# Patient Record
Sex: MALE | Race: WHITE | NOT HISPANIC OR LATINO | Employment: OTHER | URBAN - METROPOLITAN AREA
[De-identification: names, ages, dates, MRNs, and addresses within clinical notes are randomized per-mention and may not be internally consistent; named-entity substitution may affect disease eponyms.]

---

## 2017-01-07 ENCOUNTER — HOSPITAL ENCOUNTER (EMERGENCY)
Facility: CLINIC | Age: 32
Discharge: HOME | End: 2017-01-07
Attending: EMERGENCY MEDICINE | Admitting: EMERGENCY MEDICINE
Payer: COMMERCIAL

## 2017-01-07 VITALS
RESPIRATION RATE: 20 BRPM | HEIGHT: 72 IN | DIASTOLIC BLOOD PRESSURE: 90 MMHG | BODY MASS INDEX: 20.99 KG/M2 | TEMPERATURE: 98.6 F | OXYGEN SATURATION: 100 % | WEIGHT: 155 LBS | SYSTOLIC BLOOD PRESSURE: 123 MMHG

## 2017-01-07 DIAGNOSIS — F41.9 ANXIETY: ICD-10-CM

## 2017-01-07 PROCEDURE — 99285 EMERGENCY DEPT VISIT HI MDM: CPT | Mod: 25

## 2017-01-07 PROCEDURE — 25000132 ZZH RX MED GY IP 250 OP 250 PS 637: Performed by: EMERGENCY MEDICINE

## 2017-01-07 PROCEDURE — 90791 PSYCH DIAGNOSTIC EVALUATION: CPT

## 2017-01-07 PROCEDURE — 99285 EMERGENCY DEPT VISIT HI MDM: CPT

## 2017-01-07 RX ORDER — LORAZEPAM 1 MG/1
1 TABLET ORAL ONCE
Status: COMPLETED | OUTPATIENT
Start: 2017-01-07 | End: 2017-01-07

## 2017-01-07 RX ORDER — LORAZEPAM 1 MG/1
1 TABLET ORAL EVERY 8 HOURS PRN
Qty: 20 TABLET | Refills: 0 | Status: SHIPPED | OUTPATIENT
Start: 2017-01-07 | End: 2021-12-14

## 2017-01-07 RX ADMIN — LORAZEPAM 1 MG: 1 TABLET ORAL at 11:16

## 2017-01-07 ASSESSMENT — ENCOUNTER SYMPTOMS
NERVOUS/ANXIOUS: 1
DIAPHORESIS: 1
NAUSEA: 0
VOMITING: 0
DYSURIA: 0
SHORTNESS OF BREATH: 1
ROS GI COMMENTS: NO BOWEL CHANGES
APPETITE CHANGE: 1

## 2017-01-07 NOTE — ED PROVIDER NOTES
"  History     Chief Complaint:  Anxiety    HPI   Fuentes Zamora is a 31 year old male with a history of depression, generalized anxiety disorder, social phobia and panic disorder who presents to the ED for evaluation of Anxiety. The patient states his anxiety has not been this bad since middle school and he cannot relax, sit still or sleep. He mentions he has been off of his Anxiety medications, including no Seroquel for two weeks and no Abilify for about two months. He was seeing a therapist at, but has not gone in awhile. He presents today because the anxiety is \"unbearable\". He also complains of headache, a weird \"cold sweat\", back pain \"from being tense\", chest pain, shortness of breath and decreased appetite which are typical for his anxiety. He denies nausea, vomiting, bowel changes, urinary changes. He has no suicidal or homicidal thoughts. He denies drug use and states he only occasionally drinks alcohol.    Allergies:  No known drug allergies    Medications:    Ibuprofen  Tylenol    Past Medical History:    Depression  Generalized anxiety disorder  Social phobia  Panic disorder  Acne    Past Surgical History:    Tooth extraction    Family History:    Breast cancer- mother  Depression- mother, sister  Anxiety- mother, sister  Substance abuse- father, sister  Bipolar- sister    Social History:  The patient drove himself to the ED from [place].  Smoking Status: Current ever day smoker 0.50 packs/day  Alcohol Use: No  Marital Status:  Single      Review of Systems   Constitutional: Positive for diaphoresis and appetite change.   Respiratory: Positive for shortness of breath.    Cardiovascular: Positive for chest pain.   Gastrointestinal: Negative for nausea and vomiting.        No bowel changes   Genitourinary: Negative for dysuria.   Psychiatric/Behavioral: Negative for suicidal ideas. The patient is nervous/anxious.    All other systems reviewed and are negative.    Physical Exam   First Vitals:  BP: " 109/88mmHg  Temp: 98.6F (37C) Oral  HR: 80  Resp: 20  SpO2: 100%    Physical Exam  Constitutional: The patient is oriented to person, place, and time. Alert and cooperative.  HENT:   Right Ear: External ear normal.   Left Ear: External ear normal.   Nose: Nose normal.    Eyes: Conjunctivae, EOM and lids are normal.   Neck: Trachea normal. Normal range of motion. Neck supple.   Cardiovascular: Normal rate, regular rhythm, normal heart sounds, and intact distal pulses.    Pulmonary/Chest: Effort normal and breath sounds equal bilaterally. No crackles or wheezing.   Abdominal: Soft. No tenderness. No rebound and no guarding.   Musculoskeletal: Normal range of motion.  No extremity tenderness or edema.  Neurological: Alert and Oriented. No facial asymmetry. Strength 5/5 in the upper and lower extremities bilaterally.  Skin: Skin is dry. No rash noted.    Psych: Anxious appearing.     Emergency Department Course   ECG done at 1111. ECG read at 1118. Indication: Chest pain and SOB  Rate 72 bpm. AR interval 138. QRS duration 88. QT/QTc 358/392. P-R-T axes 32 92 68.  Normal sinus rhythm. Rightward axis. Borderline ECG.     Interventions:  1104 Ativan 1mg Tablet PO    Emergency Department Course:  Nursing notes and vitals reviewed.  I performed an exam of the patient as documented above.     1144 I spoke to DEC about the patient.    1231 I spoke to DEC about the patient.    Findings and plan explained to the patient. Patient discharged home with instructions regarding supportive care, medications, and reasons to return. The importance of close follow-up was reviewed.    Impression & Plan    Medical Decision Making:  Fuentes Zamora is a 31 year old male with a history of anxiety, depression, panic disorder and social phobia who presents to the emergency department for evaluation of anxiety. Upon presentation in the ED, the patient is non toxic appearing and vitals are within normal limits and stable. He does appear somewhat  anxious. He is alert, oriented and his neurologic exam is non focal. Cardiopulmonary exam is unremarkable. Abdomen is non tender throughout. The rest of his exam is as mentioned above. He was given a dose of Ativan. EKG was obtained and demonstrates normal sinus rhythm. There are no concerning acute ischemic changes. DEC was consulted and they did evaluate the patient. They were able to get the patient in to see a Psychiatry NP this Tuesday  for medication management and further evaluation. The patient denies suicidal or homicidal thoughts, therefore he does not meet criteria for inpatient management or a hold at this time. In addition, the patient does note that he feels safe being discharged at this time. Overall, given the patient's history and presentation, I do feel his symptoms are consistent with anxiety. EKG was obtained and demonstrates normal sinus rhythm. There are no concerning acute ischemic changes. Therefore, I have very low suspicion for any acute cardiac etiology of his symptoms and do not feel lab evaluation is indicated at this time. He is PERC negative, therefore PE would be unlikely and further evaluation for this is not indicated at this time. His lungs are clear to ascultation bilaterally on exam, therefore I do not feel that chest Xray is indicated at this time. The patient does note significant improvement in his symptoms after the above mentioned interventions. Overall, given that the patient is well appearing here with improved symptoms, I do feel that he can be safely discharged to home.  I did discuss with the patient that I recommend close follow up with his primary care physician and with the Psychiatry NP at his Tuesday appointment. The patient notes understanding and agreement. Return instructions were given. He was stable/improved at the time of discharge.     Diagnosis:  Anxiety    Disposition:  The patient was discharged home.    1/7/2017   [facility] EMERGENCY  DEPARTMENT    I, Cassy Azul, am serving as a scribe at 1046 on January 7, 2017  to document services personally performed by Dr. Leung, based on my observations and the provider's statements to me.    Fe Leung MD  01/07/17 2825

## 2017-01-07 NOTE — DISCHARGE INSTRUCTIONS
PLease follow up closely with your regular physician and the psychiatry NP at your scheduled appointment this Tuesday. Please return to the ED if your symptoms worsen or if you develop new or concerning symptoms.         Anxiety Reaction  Anxiety is the feeling we all get when we think something bad might happen. It is a normal response to stress and usually causes only a mild reaction. When anxiety becomes more severe, it can interfere with daily life. In some cases, you may not even be aware of what it is you re anxious about. There may also be a genetic link or it may be a learned behavior in the home.  Both psychological and physical triggers cause stress reaction. It's often a response to fear or emotional stress, real or imagined. This stress may come from home, family, work, or social relationships.  During an anxiety reaction, you may feel:    Helpless    Nervous    Depressed    Irritable  Your body may show signs of anxiety in many ways. You may experience:    Dry mouth    Shakiness    Dizziness    Weakness    Trouble breathing    Breathing fast (hyperventilating)    Chest pressure    Sweating    Headache    Nausea    Diarrhea    Tiredness    Inability to sleep    Sexual problems  Home care    Try to locate the sources of stress in your life. They may not be obvious. These may include:    Daily hassles of life (traffic jams, missed appointments, car troubles, etc.)    Major life changes, both good (new baby, job promotion) and bad (loss of job, loss of loved one)    Overload: feeling that you have too many responsibilities and can't take care of all of them at once    Feeling helpless, feeling that your problems are beyond what you re able to solve    Notice how your body reacts to stress. Learn to listen to your body signals. This will help you take action before the stress becomes severe.    When you can, do something about the source of your stress. (Avoid hassles, limit the amount of change that happens  in your life at one time and take a break when you feel overloaded).    Unfortunately, many stressful situations can't be avoided. It is necessary to learn how to better manage stress. There are many proven methods that will reduce your anxiety. These include simple things like exercise, good nutrition and adequate rest. Also, there are certain techniques that are helpful:    Relaxation    Breathing exercises    Visualization    Biofeedback    Meditation  For more information about this, consult your doctor or go to a local bookstore and review the many books and tapes available on this subject.  Follow-up care  If you feel that your anxiety is not responding to self-help measures, contact your doctor or make an appointment with a counselor. You may need short-term psychological counseling and temporary medicine to help you manage stress.  Call 911  Call your healthcare provider right away if any of these occur:    Trouble breathing    Confusion    Drowsiness or trouble wakening    Fainting or loss of consciousness    Rapid heart rate    Seizure    New chest pain that becomes more severe, lasts longer, or spreads into your shoulder, arm, neck, jaw, or back  When to seek medical advice  Call your healthcare provider right away if any of these occur:    Your symptoms get worse    Severe headache not relieved by rest and mild pain reliever    0159-0488 [employer]. [address] [place] [address], [place] [address]. All rights reserved. This information is not intended as a substitute for professional medical care. Always follow your healthcare professional's instructions.

## 2017-01-07 NOTE — ED NOTES
Pt has hx of anxiety and ran out of medications.  His anxiety got worse over past 3 days and he is unable to sleep.

## 2017-01-07 NOTE — ED AVS SNAPSHOT
[facility] Emergency Department    [address]    [facility] [address]    Phone:  [phone]    Fax:  [phone]                                       Fuentes Zamora   MRN: [id number]    Department:  [facility] Emergency Department   Date of Visit:  1/7/2017           After Visit Summary Signature Page     I have received my discharge instructions, and my questions have been answered. I have discussed any challenges I see with this plan with the nurse or doctor.    ..........................................................................................................................................  Patient/Patient Representative Signature      ..........................................................................................................................................  Patient Representative Print Name and Relationship to Patient    ..................................................               ................................................  Date                                            Time    ..........................................................................................................................................  Reviewed by Signature/Title    ...................................................              ..............................................  Date                                                            Time

## 2017-01-07 NOTE — ED AVS SNAPSHOT
[facility] Emergency Department    [address] [place] [address]    Phone:  [phone]    Fax:  [phone]                                       Fuentes Zamora   MRN: [id number]    Department:  [facility] Emergency Department   Date of Visit:  1/7/2017           Patient Information     Date Of Birth          1985        Your diagnoses for this visit were:     Anxiety        You were seen by Fe Leung MD.      Follow-up Information     Follow up with Clinic, [facility] In 3 days.    Contact information:    [phone]          Discharge Instructions       PLease follow up closely with your regular physician and the psychiatry NP at your scheduled appointment this Tuesday. Please return to the ED if your symptoms worsen or if you develop new or concerning symptoms.         Anxiety Reaction  Anxiety is the feeling we all get when we think something bad might happen. It is a normal response to stress and usually causes only a mild reaction. When anxiety becomes more severe, it can interfere with daily life. In some cases, you may not even be aware of what it is you re anxious about. There may also be a genetic link or it may be a learned behavior in the home.  Both psychological and physical triggers cause stress reaction. It's often a response to fear or emotional stress, real or imagined. This stress may come from home, family, work, or social relationships.  During an anxiety reaction, you may feel:    Helpless    Nervous    Depressed    Irritable  Your body may show signs of anxiety in many ways. You may experience:    Dry mouth    Shakiness    Dizziness    Weakness    Trouble breathing    Breathing fast (hyperventilating)    Chest pressure    Sweating    Headache    Nausea    Diarrhea    Tiredness    Inability to sleep    Sexual problems  Home care    Try to locate the sources of stress in your life. They may not be obvious. These may  include:    Daily hassles of life (traffic jams, missed appointments, car troubles, etc.)    Major life changes, both good (new baby, job promotion) and bad (loss of job, loss of loved one)    Overload: feeling that you have too many responsibilities and can't take care of all of them at once    Feeling helpless, feeling that your problems are beyond what you re able to solve    Notice how your body reacts to stress. Learn to listen to your body signals. This will help you take action before the stress becomes severe.    When you can, do something about the source of your stress. (Avoid hassles, limit the amount of change that happens in your life at one time and take a break when you feel overloaded).    Unfortunately, many stressful situations can't be avoided. It is necessary to learn how to better manage stress. There are many proven methods that will reduce your anxiety. These include simple things like exercise, good nutrition and adequate rest. Also, there are certain techniques that are helpful:    Relaxation    Breathing exercises    Visualization    Biofeedback    Meditation  For more information about this, consult your doctor or go to a local bookstore and review the many books and tapes available on this subject.  Follow-up care  If you feel that your anxiety is not responding to self-help measures, contact your doctor or make an appointment with a counselor. You may need short-term psychological counseling and temporary medicine to help you manage stress.  Call 911  Call your healthcare provider right away if any of these occur:    Trouble breathing    Confusion    Drowsiness or trouble wakening    Fainting or loss of consciousness    Rapid heart rate    Seizure    New chest pain that becomes more severe, lasts longer, or spreads into your shoulder, arm, neck, jaw, or back  When to seek medical advice  Call your healthcare provider right away if any of these occur:    Your symptoms get worse    Severe  headache not relieved by rest and mild pain reliever    5512-7307 [employer]. [address] [place] [address], [place] [address]. All rights reserved. This information is not intended as a substitute for professional medical care. Always follow your healthcare professional's instructions.          24 Hour Appointment Hotline       To make an appointment at any [facility], call [phone] ([phone]). If you don't have a family doctor or clinic, we will help you find one. [facility] clinics are conveniently located to serve the needs of you and your family.             Review of your medicines      START taking        Dose / Directions Last dose taken    LORazepam 1 MG tablet   Commonly known as:  ATIVAN   Dose:  1 mg   Quantity:  20 tablet        Take 1 tablet (1 mg) by mouth every 8 hours as needed for anxiety   Refills:  0          Our records show that you are taking the medicines listed below. If these are incorrect, please call your family doctor or clinic.        Dose / Directions Last dose taken    ABILIFY PO   Dose:  10 mg        Take 10 mg by mouth daily   Refills:  0        ibuprofen 200 MG tablet   Commonly known as:  ADVIL/MOTRIN   Dose:  600 mg   Quantity:  30 tablet        Take 3 tablets (600 mg) by mouth every 8 hours as needed for mild pain   Refills:  0        QUEtiapine 25 MG tablet   Commonly known as:  SEROquel   Dose:  25-50 mg   Quantity:  90 tablet        Take 1-2 tablets (25-50 mg) by mouth every 4 hours as needed (Severe anxiety)   Refills:  0        TYLENOL PO        Refills:  0                Prescriptions were sent or printed at these locations (1 Prescription)                   Other Prescriptions                Printed at Department/Unit printer (1 of 1)         LORazepam (ATIVAN) 1 MG tablet                Procedures and tests performed during your visit     EKG 12 lead      Orders Needing Specimen Collection     None      Pending Results     Date and Time Order Name  Status Description    1/7/2017 1104 EKG 12 lead Preliminary             Pending Culture Results     No orders found from 1/6/2017 to 1/8/2017.             Test Results from your hospital stay            Clinical Quality Measure: Blood Pressure Screening     Your blood pressure was checked while you were in the emergency department today. The last reading we obtained was  BP: 123/90 mmHg . Please read the guidelines below about what these numbers mean and what you should do about them.  If your systolic blood pressure (the top number) is less than 120 and your diastolic blood pressure (the bottom number) is less than 80, then your blood pressure is normal. There is nothing more that you need to do about it.  If your systolic blood pressure (the top number) is 120-139 or your diastolic blood pressure (the bottom number) is 80-89, your blood pressure may be higher than it should be. You should have your blood pressure rechecked within a year by a primary care provider.  If your systolic blood pressure (the top number) is 140 or greater or your diastolic blood pressure (the bottom number) is 90 or greater, you may have high blood pressure. High blood pressure is treatable, but if left untreated over time it can put you at risk for heart attack, stroke, or kidney failure. You should have your blood pressure rechecked by a primary care provider within the next 4 weeks.  If your provider in the emergency department today gave you specific instructions to follow-up with your doctor or provider even sooner than that, you should follow that instruction and not wait for up to 4 weeks for your follow-up visit.        Thank you for choosing [facility]       Thank you for choosing [facility] for your care. Our goal is always to provide you with excellent care. Hearing back from our patients is one way we can continue to improve our services. Please take a few minutes to complete the written survey that you may receive in the mail  "after you visit with us. Thank you!        [facility] Information     [employer] lets you send messages to your doctor, view your test results, renew your prescriptions, schedule appointments and more. To sign up, go to [email or web address] . Click on \"Log in\" on the left side of the screen, which will take you to the Welcome page. Then click on \"Sign up Now\" on the right side of the page.     You will be asked to enter the access code listed below, as well as some personal information. Please follow the directions to create your username and password.     Your access code is: [email or web address]  Expires: 2017  1:25 PM     Your access code will  in 90 days. If you need help or a new code, please call your [facility] clinic or [phone].        Care EveryWhere ID     This is your Care EveryWhere ID. This could be used by other organizations to access your [facility] medical records  [id number]        After Visit Summary       This is your record. Keep this with you and show to your community pharmacist(s) and doctor(s) at your next visit.                  "

## 2017-01-09 LAB — INTERPRETATION ECG - MUSE: NORMAL

## 2017-01-15 ENCOUNTER — HOSPITAL ENCOUNTER (EMERGENCY)
Facility: CLINIC | Age: 32
Discharge: HOME | End: 2017-01-15
Attending: EMERGENCY MEDICINE | Admitting: EMERGENCY MEDICINE
Payer: COMMERCIAL

## 2017-01-15 VITALS
TEMPERATURE: 97.8 F | DIASTOLIC BLOOD PRESSURE: 73 MMHG | OXYGEN SATURATION: 98 % | HEART RATE: 102 BPM | RESPIRATION RATE: 16 BRPM | SYSTOLIC BLOOD PRESSURE: 136 MMHG

## 2017-01-15 DIAGNOSIS — F32.A DEPRESSION: ICD-10-CM

## 2017-01-15 DIAGNOSIS — F41.0 PANIC DISORDER WITHOUT AGORAPHOBIA: ICD-10-CM

## 2017-01-15 LAB — ALCOHOL BREATH TEST: 0.28 (ref 0–0.01)

## 2017-01-15 PROCEDURE — 82075 ASSAY OF BREATH ETHANOL: CPT | Performed by: EMERGENCY MEDICINE

## 2017-01-15 PROCEDURE — 25000132 ZZH RX MED GY IP 250 OP 250 PS 637: Performed by: EMERGENCY MEDICINE

## 2017-01-15 PROCEDURE — 99284 EMERGENCY DEPT VISIT MOD MDM: CPT | Mod: Z6 | Performed by: EMERGENCY MEDICINE

## 2017-01-15 PROCEDURE — 99285 EMERGENCY DEPT VISIT HI MDM: CPT | Performed by: EMERGENCY MEDICINE

## 2017-01-15 RX ORDER — QUETIAPINE FUMARATE 50 MG/1
50 TABLET, FILM COATED ORAL 3 TIMES DAILY
Qty: 30 TABLET | Refills: 0 | Status: SHIPPED | OUTPATIENT
Start: 2017-01-15 | End: 2021-12-14

## 2017-01-15 RX ORDER — ARIPIPRAZOLE 10 MG/1
10 TABLET ORAL DAILY
Qty: 30 TABLET | Refills: 0 | Status: SHIPPED | OUTPATIENT
Start: 2017-01-15 | End: 2021-12-14

## 2017-01-15 RX ORDER — DIAZEPAM 10 MG
10 TABLET ORAL ONCE
Status: COMPLETED | OUTPATIENT
Start: 2017-01-15 | End: 2017-01-15

## 2017-01-15 RX ADMIN — DIAZEPAM 10 MG: 10 TABLET ORAL at 02:33

## 2017-01-15 NOTE — ED NOTES
Bed: HW02  Expected date: 1/15/17  Expected time: 12:13 AM  Means of arrival: Ambulance  Comments:  Fatou  34 yo M  Psych eval

## 2017-01-15 NOTE — ED AVS SNAPSHOT
[facility], Emergency Department    [address] [place] [place] [address]    Phone:  [phone]    Fax:  [phone]                                       Fuentes Zamora   MRN: [id number]    Department:  [facility], Emergency Department   Date of Visit:  1/15/2017           After Visit Summary Signature Page     I have received my discharge instructions, and my questions have been answered. I have discussed any challenges I see with this plan with the nurse or doctor.    ..........................................................................................................................................  Patient/Patient Representative Signature      ..........................................................................................................................................  Patient Representative Print Name and Relationship to Patient    ..................................................               ................................................  Date                                            Time    ..........................................................................................................................................  Reviewed by Signature/Title    ...................................................              ..............................................  Date                                                            Time

## 2017-01-15 NOTE — ED AVS SNAPSHOT
[facility], Emergency Department    [address]    [place] [address]    Phone:  [phone]    Fax:  [phone]                                       Fuentes Zamora   MRN: [id number]    Department:  [facility], Emergency Department   Date of Visit:  1/15/2017           Patient Information     Date Of Birth          1985        Your diagnoses for this visit were:     Depression     Panic disorder without agoraphobia        You were seen by Elton Wu MD.        Discharge Instructions         Depression  Depression is one of the most common mental health problems today. It is not just a state of unhappiness or sadness. It is a true disease. The cause seems to be related to a decrease in chemicals that transmit signals in the brain. Having a family history of depression, alcoholism, or suicide increases the risk. Chronic illness, chronic pain, migraine headaches and high emotional stress also increase the risk.  Depression is something we tend to recognize in others, but may have a hard time seeing in ourselves. It can show in many physical and emotional ways:    Loss of appetite    Over-eating    Not being able to sleep    Sleeping too much    Tiredness not related to physical exertion    Restlessness or irritability    Slowness of movement or speech    Feeling depressed or withdrawn    Loss of interest in things you once enjoyed    Trouble concentrating, poor memory, trouble making decisions    Thoughts of harming or killing oneself, or thoughts that life is not worth living    Low self-esteem  The treatment for depression may include both medicine and psychotherapy. Antidepressants can reduce suffering and can improve the ability to function during the depressed period. Therapy can offer emotional support and help you understand emotional factors that may be causing the depression.  Home care    On-going care and support helps people manage this disease.  Find a healthcare  provider and therapist who meet your needs. Seek help when you feel like you may be getting ill.    Be kind to yourself. Make it a point to do things that you enjoy (gardening, walking in nature, going to a movie, etc.). Reward yourself for small successes.    Take care of your physical body. Eat a balanced diet (low in saturated fat and high in fruits and vegetables). Exercise at least 3 times a week for 30 minutes. Even mild-moderate exercise (like brisk walking) can make you feel better.    Avoid alcohol, which can make depression worse.    Take medicine as prescribed.    Tell each of your healthcare providers about all of the prescription drugs, over-the-counter medicines, vitamins, and supplements you take. Certain supplements interact with medicines and can result in dangerous side effects. Ask your pharmacist when you have questions about drug interactions.    Talk with your family and trusted friends about your feelings and thoughts. Ask them to help you recognize behavior changes early so you can get help and, if needed, medicine can be adjusted.  Follow-up care  Follow up with your healthcare provider, or as advised.  Call 911  Call 911 if you:    Have suicidal thoughts, a suicide plan, and the means to carry out the plan    Have trouble breathing    Are very confused    Feel very drowsy or have trouble awakening    Faint or lose consciousness    Have new chest pain that becomes more severe, lasts longer, or spreads into your shoulder, arm, neck, jaw or back  When to seek medical advice  Call your healthcare provider right away if any of these occur:    Feeling extreme depression, fear, anxiety, or anger toward yourself or others    Feeling out of control    Feeling that you may try to harm yourself or another    Hearing voices that others do not hear    Seeing things that others do not see    Can t sleep or eat for 3 days in a row    Friends or family express concern over your behavior and ask you to seek  help    4983-9770 [employer]. [address] [place] [address], [place] [address]. All rights reserved. This information is not intended as a substitute for professional medical care. Always follow your healthcare professional's instructions.          24 Hour Appointment Hotline       To make an appointment at any [facility], call [phone] ([phone]). If you don't have a family doctor or clinic, we will help you find one. [facility] are conveniently located to serve the needs of you and your family.             Review of your medicines      CONTINUE these medicines which may have CHANGED, or have new prescriptions. If we are uncertain of the size of tablets/capsules you have at home, strength may be listed as something that might have changed.        Dose / Directions Last dose taken    * ABILIFY PO   Dose:  10 mg   What changed:  Another medication with the same name was added. Make sure you understand how and when to take each.        Take 10 mg by mouth daily   Refills:  0        * ARIPiprazole 10 MG tablet   Commonly known as:  ABILIFY   Dose:  10 mg   What changed:  You were already taking a medication with the same name, and this prescription was added. Make sure you understand how and when to take each.   Quantity:  30 tablet        Take 1 tablet (10 mg) by mouth daily   Refills:  0        * QUEtiapine 25 MG tablet   Commonly known as:  SEROquel   Dose:  25-50 mg   What changed:    - how much to take  - when to take this   Quantity:  90 tablet        Take 1-2 tablets (25-50 mg) by mouth every 4 hours as needed (Severe anxiety)   Refills:  0        * QUEtiapine 50 MG tablet   Commonly known as:  SEROQUEL   Dose:  50 mg   What changed:  You were already taking a medication with the same name, and this prescription was added. Make sure you understand how and when to take each.   Quantity:  30 tablet        Take 1 tablet (50 mg) by mouth 3 times daily   Refills:  0        * Notice:   "This list has 4 medication(s) that are the same as other medications prescribed for you. Read the directions carefully, and ask your doctor or other care provider to review them with you.      Our records show that you are taking the medicines listed below. If these are incorrect, please call your family doctor or clinic.        Dose / Directions Last dose taken    ibuprofen 200 MG tablet   Commonly known as:  ADVIL/MOTRIN   Dose:  600 mg   Quantity:  30 tablet        Take 3 tablets (600 mg) by mouth every 8 hours as needed for mild pain   Refills:  0        LORazepam 1 MG tablet   Commonly known as:  ATIVAN   Dose:  1 mg   Quantity:  20 tablet        Take 1 tablet (1 mg) by mouth every 8 hours as needed for anxiety   Refills:  0        TYLENOL PO        Refills:  0                Prescriptions were sent or printed at these locations (2 Prescriptions)                   Other Prescriptions                Printed at Department/Unit printer (2 of 2)         QUEtiapine (SEROQUEL) 50 MG tablet               ARIPiprazole (ABILIFY) 10 MG tablet                Procedures and tests performed during your visit     Alcohol breath test POCT      Orders Needing Specimen Collection     None      Pending Results     No orders found from 1/14/2017 to 1/16/2017.            Pending Culture Results     No orders found from 1/14/2017 to 1/16/2017.            Thank you for choosing [facility]       Thank you for choosing [facility] for your care. Our goal is always to provide you with excellent care. Hearing back from our patients is one way we can continue to improve our services. Please take a few minutes to complete the written survey that you may receive in the mail after you visit with us. Thank you!        [employer] Information     [employer] lets you send messages to your doctor, view your test results, renew your prescriptions, schedule appointments and more. To sign up, go to [email or web address] . Click on \"Log in\" on the left side " "of the screen, which will take you to the Welcome page. Then click on \"Sign up Now\" on the right side of the page.     You will be asked to enter the access code listed below, as well as some personal information. Please follow the directions to create your username and password.     Your access code is: [email or web address]  Expires: 2017  1:25 PM     Your access code will  in 90 days. If you need help or a new code, please call your [facility] clinic or [phone].        Care EveryWhere ID     This is your Care EveryWhere ID. This could be used by other organizations to access your [facility] medical records  [id number]        After Visit Summary       This is your record. Keep this with you and show to your community pharmacist(s) and doctor(s) at your next visit.                  "

## 2017-01-15 NOTE — DISCHARGE INSTRUCTIONS

## 2017-01-15 NOTE — ED NOTES
Pt reports drinking 1 L whiskey per day since 1/1/17 to help manage his anxiety. He ran out of prescribed abilify about 2 months ago and was unable to make it to his appointments because he was living in [place] and doesn't drive. He obtained a prescription for Ativan on 1/7/17 and took all 20 pills within 3 days. Pt denies drinking those 3 days. He is seeking evaluation for his depression/anxiety as well as detox from alcohol. He reports suicidal ideation with no plan. Past attempt at hanging self in closet with a belt. He is able to contract for safety while in ED.

## 2017-01-22 NOTE — ED PROVIDER NOTES
History     Chief Complaint   Patient presents with     Anxiety     has been using alcohol to manage anxiety for about a week, since he ran out of his Rx lorazepam     Alcohol Problem     wants detox     HPI  Fuentes Zamora is a 32 year old male who presents by EMS requesting detox from alcohol.  He states he has been having increasing anxiety recently, since he ran out of his ativan.  He has been drinking whisky to cope with his anxiety, about 1 L per day.  He has been drinking for the last 2 months.  He states that he had some suicidal thoughts earlier but has no thoughts now.  He has a prior attempt at hanging him self.  He denies any other drug use.  He denies any history of seizure.  No vomiting or abdominal pain.        Past Medical History   Diagnosis Date     Other acne      Depressive disorder      anxiety     Anxiety        PAST SURGICAL HISTORY:   Past Surgical History   Procedure Laterality Date     Hc tooth extraction w/forcep       Under general anesthesia       FAMILY HISTORY:   Family History   Problem Relation Age of Onset     CANCER Mother      Breast cancer age 52     Depression Mother      Anxiety Disorder Mother      Substance Abuse Father      Substance Abuse Sister      Bipolar Disorder Sister      Anxiety Disorder Sister      Depression Sister        SOCIAL HISTORY:   Social History   Substance Use Topics     Smoking status: Current Every Day Smoker -- 0.50 packs/day     Types: Cigarettes     Smokeless tobacco: Not on file     Alcohol Use: Yes      Comment: 1 L whiskey/day since new years       Discharge Medication List as of 1/15/2017  6:21 AM      START taking these medications    Details   !! QUEtiapine (SEROQUEL) 50 MG tablet Take 1 tablet (50 mg) by mouth 3 times daily, Disp-30 tablet, R-0, Local Print      !! ARIPiprazole (ABILIFY) 10 MG tablet Take 1 tablet (10 mg) by mouth daily, Disp-30 tablet, R-0, Local Print       !! - Potential duplicate medications found. Please discuss with  provider.      CONTINUE these medications which have NOT CHANGED    Details   LORazepam (ATIVAN) 1 MG tablet Take 1 tablet (1 mg) by mouth every 8 hours as needed for anxiety, Disp-20 tablet, R-0, Local Print      !! QUEtiapine (SEROQUEL) 25 MG tablet Take 1-2 tablets (25-50 mg) by mouth every 4 hours as needed (Severe anxiety), Disp-90 tablet, R-0, Local Print      ibuprofen (ADVIL,MOTRIN) 200 MG tablet Take 3 tablets (600 mg) by mouth every 8 hours as needed for mild pain, Disp-30 tablet, R-0, Local Print      !! ARIPiprazole (ABILIFY PO) Take 10 mg by mouth daily , Historical      Acetaminophen (TYLENOL PO) Historical       !! - Potential duplicate medications found. Please discuss with provider.             Allergies   Allergen Reactions     No Known Allergies        I have reviewed the Medications, Allergies, Past Medical and Surgical History, and Social History in the Epic system.    Review of Systems   10 pt ROS completed and negative except as noted above in HPI      Physical Exam   BP: 113/84 mmHg  Pulse: 102  Heart Rate: 82  Temp: 97.6  F (36.4  C)  Resp: 18  SpO2: 100 %  Physical Exam   Constitutional: He is oriented to person, place, and time. No distress.   HENT:   Head: Normocephalic and atraumatic.   Mouth/Throat: Oropharynx is clear and moist. No oropharyngeal exudate.   Eyes: Conjunctivae are normal. Pupils are equal, round, and reactive to light.   Neck: Normal range of motion. Neck supple.   Cardiovascular: Normal rate and intact distal pulses.    Pulmonary/Chest: Effort normal. No respiratory distress. He has no wheezes. He has no rales.   Abdominal: Soft. There is no tenderness. There is no rebound and no guarding.   Musculoskeletal: Normal range of motion. He exhibits no edema or tenderness.   Neurological: He is alert and oriented to person, place, and time. He exhibits normal muscle tone.   Skin: Skin is warm and dry. No rash noted. He is not diaphoretic.   Psychiatric: He has a normal mood  and affect. His behavior is normal. Thought content normal.   Nursing note and vitals reviewed.      ED Course     [unfilled]  Procedures             Critical Care time:  none               Labs Ordered and Resulted from Time of ED Arrival Up to the Time of Departure from the ED   ALCOHOL BREATH TEST POCT - Abnormal; Notable for the following:     Alcohol Breath Test 0.275 (*)     All other components within normal limits       Assessments & Plan (with Medical Decision Making)   1.  Alcohol dependence    31 yo M with alcohol intoxication requesting detox.  ABT was 0.275.   He was monitored until sober and was given Valium 10mg PO for mild symptoms of withdrawal.  There were no detox beds available.  He was discharged with contact information for local detox units.      I have reviewed the nursing notes.    I have reviewed the findings, diagnosis, plan and need for follow up with the patient.    Discharge Medication List as of 1/15/2017  6:21 AM      START taking these medications    Details   !! QUEtiapine (SEROQUEL) 50 MG tablet Take 1 tablet (50 mg) by mouth 3 times daily, Disp-30 tablet, R-0, Local Print      !! ARIPiprazole (ABILIFY) 10 MG tablet Take 1 tablet (10 mg) by mouth daily, Disp-30 tablet, R-0, Local Print       !! - Potential duplicate medications found. Please discuss with provider.          Final diagnoses:   Panic disorder without agoraphobia       1/15/2017   [facility], [place], EMERGENCY DEPARTMENT      Elton Wu MD  01/22/17 2047

## 2020-11-05 ENCOUNTER — MEDICAL CORRESPONDENCE (OUTPATIENT)
Dept: HEALTH INFORMATION MANAGEMENT | Facility: CLINIC | Age: 35
End: 2020-11-05
Payer: COMMERCIAL

## 2021-12-14 ENCOUNTER — VIRTUAL VISIT (OUTPATIENT)
Dept: FAMILY MEDICINE | Facility: CLINIC | Age: 36
End: 2021-12-14
Payer: COMMERCIAL

## 2021-12-14 DIAGNOSIS — F32.1 MAJOR DEPRESSIVE DISORDER, SINGLE EPISODE, MODERATE (H): ICD-10-CM

## 2021-12-14 DIAGNOSIS — F90.0 ATTENTION DEFICIT HYPERACTIVITY DISORDER, PREDOMINANTLY INATTENTIVE TYPE: Primary | ICD-10-CM

## 2021-12-14 DIAGNOSIS — F41.1 GENERALIZED ANXIETY DISORDER: ICD-10-CM

## 2021-12-14 PROCEDURE — 99203 OFFICE O/P NEW LOW 30 MIN: CPT | Mod: 95 | Performed by: FAMILY MEDICINE

## 2021-12-14 RX ORDER — ARIPIPRAZOLE 10 MG/1
10 TABLET ORAL DAILY
Qty: 90 TABLET | Refills: 0 | Status: SHIPPED | OUTPATIENT
Start: 2021-12-14 | End: 2022-02-22

## 2021-12-14 RX ORDER — QUETIAPINE FUMARATE 50 MG/1
50 TABLET, FILM COATED ORAL AT BEDTIME
Qty: 90 TABLET | Refills: 0 | Status: SHIPPED | OUTPATIENT
Start: 2021-12-14 | End: 2022-02-22

## 2021-12-14 RX ORDER — ARIPIPRAZOLE 10 MG/1
10 TABLET ORAL DAILY
COMMUNITY
End: 2021-12-14

## 2021-12-14 ASSESSMENT — PATIENT HEALTH QUESTIONNAIRE - PHQ9
SUM OF ALL RESPONSES TO PHQ QUESTIONS 1-9: 18
5. POOR APPETITE OR OVEREATING: NEARLY EVERY DAY

## 2021-12-14 ASSESSMENT — ANXIETY QUESTIONNAIRES
7. FEELING AFRAID AS IF SOMETHING AWFUL MIGHT HAPPEN: NOT AT ALL
5. BEING SO RESTLESS THAT IT IS HARD TO SIT STILL: NEARLY EVERY DAY
IF YOU CHECKED OFF ANY PROBLEMS ON THIS QUESTIONNAIRE, HOW DIFFICULT HAVE THESE PROBLEMS MADE IT FOR YOU TO DO YOUR WORK, TAKE CARE OF THINGS AT HOME, OR GET ALONG WITH OTHER PEOPLE: VERY DIFFICULT
1. FEELING NERVOUS, ANXIOUS, OR ON EDGE: NEARLY EVERY DAY
GAD7 TOTAL SCORE: 16
2. NOT BEING ABLE TO STOP OR CONTROL WORRYING: NEARLY EVERY DAY
6. BECOMING EASILY ANNOYED OR IRRITABLE: SEVERAL DAYS
3. WORRYING TOO MUCH ABOUT DIFFERENT THINGS: NEARLY EVERY DAY

## 2021-12-14 NOTE — PROGRESS NOTES
Fuentes is a 36 year old who is being evaluated via a billable video visit.      How would you like to obtain your AVS? Mail a copy  If the video visit is dropped, the invitation should be resent by: Text to cell phone: [phone]  Will anyone else be joining your video visit? No    Video Start Time: 1407    Assessment & Plan     Attention deficit hyperactivity disorder, predominantly inattentive type  I did put in an referral to psychiatry for medication opinion and stabilization.  I have asked him to get a copy of his records from [place] regarding the ADHD evaluation as completed that type of information would make it much more comfortable for me to consider prescribing Adderall in the circumstances.  He was given information to contact our mental health referral team for consideration of consultation as well.  - Adult Mental Health Referral; Future    Major depressive disorder, single episode, moderate (H)  Patient reports that he uses Seroquel 50 mg at bedtime and Abilify 10 mg during the day.  I refilled these medications for him as he has been without them for about a month or month and a half.  I have asked for short-term recheck in about 4 weeks to see how he is doing.  - QUEtiapine (SEROQUEL) 50 MG tablet; Take 1 tablet (50 mg) by mouth At Bedtime  - ARIPiprazole (ABILIFY) 10 MG tablet; Take 1 tablet (10 mg) by mouth daily  - Adult Mental Health Referral; Future    Generalized anxiety disorder  As above.  - QUEtiapine (SEROQUEL) 50 MG tablet; Take 1 tablet (50 mg) by mouth At Bedtime  - ARIPiprazole (ABILIFY) 10 MG tablet; Take 1 tablet (10 mg) by mouth daily  - Adult Mental Health Referral; Future    Review of prior external note(s) from - CareEverywhere information from [facility]  reviewed         Tobacco Cessation:   reports that he has been smoking cigarettes. He has been smoking about 0.50 packs per day. He has never used smokeless tobacco.          Return in about 4 weeks (around  1/11/2022) for Mood Recheck, in person, video visit.    Washington Wynn MD  [facility]    Tello Dill is a 36 year old who presents for the following health issues     HPI     Depression and Anxiety Follow-Up    How are you doing with your depression since your last visit? Worsened since being off medication.    How are you doing with your anxiety since your last visit?  Worsened since being off medication.    Are you having other symptoms that might be associated with depression or anxiety? No    Have you had a significant life event? Job Concerns, Financial Concerns and Housing Concerns     Do you have any concerns with your use of alcohol or other drugs? No    Social History     Tobacco Use     Smoking status: Current Every Day Smoker     Packs/day: 0.50     Types: Cigarettes     Smokeless tobacco: Never Used   Vaping Use     Vaping Use: Every day     Substances: Nicotine   Substance Use Topics     Alcohol use: Yes     Comment: Variable     Drug use: No     PHQ 12/14/2021   PHQ-9 Total Score 18   Q9: Thoughts of better off dead/self-harm past 2 weeks Not at all     YANA-7 SCORE 5/8/2012 5/15/2012 12/14/2021   Total Score 8 8 -   Total Score - - 16     Last PHQ-9 12/14/2021   1.  Little interest or pleasure in doing things 3   2.  Feeling down, depressed, or hopeless 3   3.  Trouble falling or staying asleep, or sleeping too much 3   4.  Feeling tired or having little energy 3   5.  Poor appetite or overeating 3   6.  Feeling bad about yourself 1   7.  Trouble concentrating 2   8.  Moving slowly or restless 0   Q9: Thoughts of better off dead/self-harm past 2 weeks 0   PHQ-9 Total Score 18   Difficulty at work, home, or with people Very difficult     YANA-7  12/14/2021   1. Feeling nervous, anxious, or on edge 3   2. Not being able to stop or control worrying 3   3. Worrying too much about different things 3   4. Trouble relaxing 3   5. Being so restless that it is hard to sit  still 3   6. Becoming easily annoyed or irritable 1   7. Feeling afraid, as if something awful might happen 0   YANA-7 Total Score 16   If you checked any problems, how difficult have they made it for you to do your work, take care of things at home, or get along with other people? Very difficult       Suicide Assessment Five-step Evaluation and Treatment (SAFE-T)      How many servings of fruits and vegetables do you eat daily?      On average, how many sweetened beverages do you drink each day (Examples: soda, juice, sweet tea, etc.  Do NOT count diet or artificially sweetened beverages)?       How many days per week do you exercise enough to make your heart beat faster?     How many minutes a day do you exercise enough to make your heart beat faster?     How many days per week do you miss taking your medication?     Patient been living in [place] and getting his medications from a provider there.  He also takes Adderall but felt that we probably would be able to address that today.  His main concerns are getting back on Abilify and Seroquel with which she has been out for about 1 or 2 months.  He feels that his symptoms of depression and anxiety have worsened.  He does have a previous hospitalization for depression with suicidal ideation but denies any current suicidal ideation or thoughts of harm.  He said he did have a full ADHD assessment in [place] a few years ago so have asked him to try to get a copy of that for review and fill out our chart here.    Review of Systems   Constitutional, HEENT, cardiovascular, pulmonary, gi and gu systems are negative, except as otherwise noted.      Objective           Vitals:  No vitals were obtained today due to virtual visit.    Physical Exam   GENERAL: Healthy, alert and no distress  EYES: Eyes grossly normal to inspection.  No discharge or erythema, or obvious scleral/conjunctival abnormalities.  RESP: No audible wheeze, cough, or visible cyanosis.  No visible  retractions or increased work of breathing.    SKIN: Visible skin clear. No significant rash, abnormal pigmentation or lesions.  NEURO: Cranial nerves grossly intact.  Mentation and speech appropriate for age.  PSYCH: Mentation appears normal, affect normal/bright, judgement and insight intact, normal speech and appearance well-groomed.  PSYCH: Somewhat restless.                Video-Visit Details    Type of service:  Video Visit    Video End Time:1429    Originating Location (pt. Location): Home    Distant Location (provider location):  [facility]     Platform used for Video Visit: [employer]

## 2021-12-15 ASSESSMENT — ANXIETY QUESTIONNAIRES: GAD7 TOTAL SCORE: 16

## 2022-01-11 ENCOUNTER — TELEPHONE (OUTPATIENT)
Dept: FAMILY MEDICINE | Facility: CLINIC | Age: 37
End: 2022-01-11

## 2022-01-11 ENCOUNTER — VIRTUAL VISIT (OUTPATIENT)
Dept: FAMILY MEDICINE | Facility: CLINIC | Age: 37
End: 2022-01-11
Payer: COMMERCIAL

## 2022-01-11 DIAGNOSIS — F90.0 ATTENTION DEFICIT HYPERACTIVITY DISORDER, PREDOMINANTLY INATTENTIVE TYPE: ICD-10-CM

## 2022-01-11 DIAGNOSIS — N52.9 ERECTILE DYSFUNCTION, UNSPECIFIED ERECTILE DYSFUNCTION TYPE: Primary | ICD-10-CM

## 2022-01-11 DIAGNOSIS — F32.1 MAJOR DEPRESSIVE DISORDER, SINGLE EPISODE, MODERATE (H): ICD-10-CM

## 2022-01-11 DIAGNOSIS — F41.1 GENERALIZED ANXIETY DISORDER: ICD-10-CM

## 2022-01-11 PROCEDURE — 99214 OFFICE O/P EST MOD 30 MIN: CPT | Mod: 95 | Performed by: FAMILY MEDICINE

## 2022-01-11 RX ORDER — TADALAFIL 10 MG/1
10 TABLET ORAL DAILY PRN
Qty: 15 TABLET | Refills: 1 | Status: SHIPPED | OUTPATIENT
Start: 2022-01-11 | End: 2022-02-22

## 2022-01-11 ASSESSMENT — ANXIETY QUESTIONNAIRES
GAD7 TOTAL SCORE: 5
2. NOT BEING ABLE TO STOP OR CONTROL WORRYING: NOT AT ALL
7. FEELING AFRAID AS IF SOMETHING AWFUL MIGHT HAPPEN: NOT AT ALL
6. BECOMING EASILY ANNOYED OR IRRITABLE: SEVERAL DAYS
IF YOU CHECKED OFF ANY PROBLEMS ON THIS QUESTIONNAIRE, HOW DIFFICULT HAVE THESE PROBLEMS MADE IT FOR YOU TO DO YOUR WORK, TAKE CARE OF THINGS AT HOME, OR GET ALONG WITH OTHER PEOPLE: SOMEWHAT DIFFICULT
3. WORRYING TOO MUCH ABOUT DIFFERENT THINGS: SEVERAL DAYS
1. FEELING NERVOUS, ANXIOUS, OR ON EDGE: NOT AT ALL
5. BEING SO RESTLESS THAT IT IS HARD TO SIT STILL: MORE THAN HALF THE DAYS

## 2022-01-11 ASSESSMENT — PATIENT HEALTH QUESTIONNAIRE - PHQ9
5. POOR APPETITE OR OVEREATING: SEVERAL DAYS
SUM OF ALL RESPONSES TO PHQ QUESTIONS 1-9: 6

## 2022-01-11 NOTE — PROGRESS NOTES
Fuentes is a 36 year old who is being evaluated via a billable video visit.      How would you like to obtain your AVS? MyChart  If the video visit is dropped, the invitation should be resent by: Text to cell phone: [phone]  Will anyone else be joining your video visit? No      Video Start Time: 1400    Assessment & Plan     Erectile dysfunction, unspecified erectile dysfunction type  He did mention today's had some issues with erectile dysfunction.  Interest is normal but difficulty achieving an erection.  He has been receiving a prescription for Cialis through an online doctor and pharmacy but is wondering if he could get it locally.  Prescription was sent to the pharmacy.  He is familiar with use and side effects.  - tadalafil (CIALIS) 10 MG tablet; Take 1 tablet (10 mg) by mouth daily as needed    Attention deficit hyperactivity disorder, predominantly inattentive type  Still have not gotten records.  He contacted the office in [place] and they do not have a copy of the original evaluation so I have asked him to get records documenting that he has been seeing them for this diagnosis and what the treatment has been.  I have asked for follow-up in 2 months but we could touch base earlier with the situation if records are obtained.    Generalized anxiety disorder  Significantly improved at this time on his current medications.  3-month supply was issued initially so I have asked for follow-up prior to refill in about 2 months.    Major depressive disorder, single episode, moderate (H)  Also significantly improved.  Recheck in about 2 months.               Tobacco Cessation:   reports that he has been smoking cigarettes. He has been smoking about 0.50 packs per day. He has never used smokeless tobacco.          Return in about 2 months (around 3/11/2022) for Mood Recheck.    Washington Wynn MD  [facility]    Subjective   Fuentes is a 36 year old who presents for the following [facility]  issues     HPI       Patient is seen today for follow-up of mood.  He reports since going back on the quetiapine and aripiprazole that he is feeling a lot better at this time.  He did reach out to the practice in [place] to see if he can get a copy of the ADHD evaluation but they do not have that so I suggested getting just copy of the regular records and then we can review that.  He denies any side effects related to the medications.  He reports that other people of noticed an improvement as he has with his symptoms.  Attention and concentration issues are still pretty significant due to the underlying ADHD.    He has been obtaining a prescription for Cialis through an online pharmacy/provider.  He is wondering if he could get that through our office.  He reports it as a 9 mg chewable which I am unfamiliar with so I have suggested sending a 10 mg dose to the pharmacy.  Interest is good but he has some issues with achieving erections.  No other concerns today.    He did mention going to the dentist recently and his blood pressure was elevated at 140/100.  He has no symptoms related to this.  I have suggested we gather us more information and that he obtain a blood pressure cuff to check at home.  This is what the dentist had suggested also.    PATIENT HEALTH QUESTIONNAIRE-9 (PHQ - 9)    Over the last 2 weeks, how often have you been bothered by any of the following problems?    1. Little interest or pleasure in doing things -  Several days   2. Feeling down, depressed, or hopeless -  Several days   3. Trouble falling or staying asleep, or sleeping too much - Not at all   4. Feeling tired or having little energy -  Several days   5. Poor appetite or overeating -  Not at all   6. Feeling bad about yourself - or that you are a failure or have let yourself or your family down -  Not at all   7. Trouble concentrating on things, such as reading the newspaper or watching television - Nearly every day   8. Moving or  speaking so slowly that other people could have noticed? Or the opposite - being so fidgety or restless that you have been moving around a lot more than usual Not at all   9. Thoughts that you would be better off dead or of hurting  yourself in some way Not at all   Total Score: 6     If you checked off any problems, how difficult have these problems made it for you to do your work, take care of things at home, or get along with other people? Somewhat difficult    Developed by Braden Lackey, Audrey Valentin, Kana Rodrigez and colleagues, with an educational livia from [employer] No permission required to reproduce, translate, display or distribute. permission required to reproduce, translate, display or distribute.    PHQ 12/14/2021 1/11/2022   PHQ-9 Total Score 18 6   Q9: Thoughts of better off dead/self-harm past 2 weeks Not at all Not at all     YANA-7 SCORE 5/15/2012 12/14/2021 1/11/2022   Total Score 8 - -   Total Score - 16 5           Review of Systems   Constitutional, HEENT, cardiovascular, pulmonary, gi and gu systems are negative, except as otherwise noted.      Objective           Vitals:  No vitals were obtained today due to virtual visit.    Physical Exam   GENERAL: Healthy, alert and no distress  EYES: Eyes grossly normal to inspection.  No discharge or erythema, or obvious scleral/conjunctival abnormalities.  RESP: No audible wheeze, cough, or visible cyanosis.  No visible retractions or increased work of breathing.    SKIN: Visible skin clear. No significant rash, abnormal pigmentation or lesions.  NEURO: Cranial nerves grossly intact.  Mentation and speech appropriate for age.  PSYCH: Mentation appears normal, affect normal/bright, judgement and insight intact, normal speech and appearance well-groomed.                Video-Visit Details    Type of service:  Video Visit    Video End Time:1415    Originating Location (pt. Location): Home    Distant Location (provider location):  [facility]     Platform used for Video Visit: Deonna

## 2022-01-12 ASSESSMENT — ANXIETY QUESTIONNAIRES: GAD7 TOTAL SCORE: 5

## 2022-02-12 ENCOUNTER — HEALTH MAINTENANCE LETTER (OUTPATIENT)
Age: 37
End: 2022-02-12

## 2022-02-22 ENCOUNTER — VIRTUAL VISIT (OUTPATIENT)
Dept: FAMILY MEDICINE | Facility: CLINIC | Age: 37
End: 2022-02-22
Payer: COMMERCIAL

## 2022-02-22 DIAGNOSIS — F90.0 ATTENTION DEFICIT HYPERACTIVITY DISORDER, PREDOMINANTLY INATTENTIVE TYPE: Primary | ICD-10-CM

## 2022-02-22 DIAGNOSIS — F32.1 MAJOR DEPRESSIVE DISORDER, SINGLE EPISODE, MODERATE (H): ICD-10-CM

## 2022-02-22 DIAGNOSIS — F41.1 GENERALIZED ANXIETY DISORDER: ICD-10-CM

## 2022-02-22 PROCEDURE — 99213 OFFICE O/P EST LOW 20 MIN: CPT | Mod: 95 | Performed by: FAMILY MEDICINE

## 2022-02-22 RX ORDER — QUETIAPINE FUMARATE 50 MG/1
50 TABLET, FILM COATED ORAL AT BEDTIME
Qty: 90 TABLET | Refills: 0 | Status: SHIPPED | OUTPATIENT
Start: 2022-02-22 | End: 2022-04-19

## 2022-02-22 RX ORDER — DEXTROAMPHETAMINE SACCHARATE, AMPHETAMINE ASPARTATE MONOHYDRATE, DEXTROAMPHETAMINE SULFATE AND AMPHETAMINE SULFATE 5; 5; 5; 5 MG/1; MG/1; MG/1; MG/1
20 CAPSULE, EXTENDED RELEASE ORAL DAILY
Qty: 30 CAPSULE | Refills: 0 | Status: SHIPPED | OUTPATIENT
Start: 2022-02-22 | End: 2022-03-21

## 2022-02-22 RX ORDER — ARIPIPRAZOLE 10 MG/1
10 TABLET ORAL DAILY
Qty: 90 TABLET | Refills: 0 | Status: SHIPPED | OUTPATIENT
Start: 2022-02-22 | End: 2022-06-17

## 2022-03-21 ENCOUNTER — MYC REFILL (OUTPATIENT)
Dept: FAMILY MEDICINE | Facility: CLINIC | Age: 37
End: 2022-03-21
Payer: COMMERCIAL

## 2022-03-21 DIAGNOSIS — F90.0 ATTENTION DEFICIT HYPERACTIVITY DISORDER, PREDOMINANTLY INATTENTIVE TYPE: ICD-10-CM

## 2022-03-21 RX ORDER — DEXTROAMPHETAMINE SACCHARATE, AMPHETAMINE ASPARTATE MONOHYDRATE, DEXTROAMPHETAMINE SULFATE AND AMPHETAMINE SULFATE 5; 5; 5; 5 MG/1; MG/1; MG/1; MG/1
20 CAPSULE, EXTENDED RELEASE ORAL DAILY
Qty: 30 CAPSULE | Refills: 0 | Status: SHIPPED | OUTPATIENT
Start: 2022-03-21 | End: 2022-04-19

## 2022-03-21 NOTE — TELEPHONE ENCOUNTER
Requested Prescriptions   Pending Prescriptions Disp Refills     amphetamine-dextroamphetamine (ADDERALL XR) 20 MG 24 hr capsule 30 capsule 0     Sig: Take 1 capsule (20 mg) by mouth daily       There is no refill protocol information for this order        Routing refill request to provider for review/approval because:  Drug not on the [facility] refill protocol     Sophy Back RN  [facility]

## 2022-04-19 ENCOUNTER — MYC REFILL (OUTPATIENT)
Dept: FAMILY MEDICINE | Facility: CLINIC | Age: 37
End: 2022-04-19
Payer: COMMERCIAL

## 2022-04-19 DIAGNOSIS — F90.0 ATTENTION DEFICIT HYPERACTIVITY DISORDER, PREDOMINANTLY INATTENTIVE TYPE: ICD-10-CM

## 2022-04-19 DIAGNOSIS — F32.1 MAJOR DEPRESSIVE DISORDER, SINGLE EPISODE, MODERATE (H): ICD-10-CM

## 2022-04-19 DIAGNOSIS — F41.1 GENERALIZED ANXIETY DISORDER: ICD-10-CM

## 2022-04-19 NOTE — TELEPHONE ENCOUNTER
"Requested Prescriptions   Pending Prescriptions Disp Refills     QUEtiapine (SEROQUEL) 50 MG tablet 90 tablet 0     Sig: Take 1 tablet (50 mg) by mouth At Bedtime       Antipsychotic Medications Failed - 4/19/2022  9:32 AM        Failed - Blood pressure under 140/90 in past 12 months     BP Readings from Last 3 Encounters:   01/15/17 136/73   01/07/17 123/90   05/18/16 127/83                 Failed - Lipid panel on file within the past 12 months     No lab results found.            Failed - CBC on file in past 12 months     Recent Labs   Lab Test 05/18/16  0840   WBC 10.4   RBC 4.72   HGB 15.0   HCT 43.3                    Failed - Heart Rate on file within past 12 months     Pulse Readings from Last 3 Encounters:   01/15/17 102   05/18/16 76   04/08/16 101               Failed - A1c or Glucose on file in past 12 months     Recent Labs   Lab Test 04/08/16  1748   GLC 84       Please review patients last 3 weights. If a weight gain of >10 lbs exists, you may refill the prescription once after instructing the patient to schedule an appointment within the next 30 days.    Wt Readings from Last 3 Encounters:   01/07/17 70.3 kg (155 lb)   05/18/16 70.3 kg (155 lb)   04/08/16 70.3 kg (155 lb)             Passed - Patient is 12 years of age or older        Passed - Medication is active on med list        Passed - Recent (6 mo) or future (30 days) visit within the authorizing provider's specialty     Patient had office visit in the last 6 months or has a visit in the next 30 days with authorizing provider or within the authorizing provider's specialty.  See \"Patient Info\" tab in inbasket, or \"Choose Columns\" in Meds & Orders section of the refill encounter.               amphetamine-dextroamphetamine (ADDERALL XR) 20 MG 24 hr capsule 30 capsule 0     Sig: Take 1 capsule (20 mg) by mouth daily       There is no refill protocol information for this order        Routing refill request to provider for review/approval " because:  Drug not on the [facility] refill protocol   Labs    Thanks,  FAUSTINA Beckett  [facility]

## 2022-04-20 RX ORDER — DEXTROAMPHETAMINE SACCHARATE, AMPHETAMINE ASPARTATE MONOHYDRATE, DEXTROAMPHETAMINE SULFATE AND AMPHETAMINE SULFATE 5; 5; 5; 5 MG/1; MG/1; MG/1; MG/1
20 CAPSULE, EXTENDED RELEASE ORAL DAILY
Qty: 30 CAPSULE | Refills: 0 | Status: SHIPPED | OUTPATIENT
Start: 2022-04-20 | End: 2022-05-18

## 2022-04-20 RX ORDER — QUETIAPINE FUMARATE 50 MG/1
50 TABLET, FILM COATED ORAL AT BEDTIME
Qty: 90 TABLET | Refills: 0 | Status: SHIPPED | OUTPATIENT
Start: 2022-04-20 | End: 2022-05-18

## 2022-05-18 ENCOUNTER — MYC REFILL (OUTPATIENT)
Dept: FAMILY MEDICINE | Facility: CLINIC | Age: 37
End: 2022-05-18
Payer: COMMERCIAL

## 2022-05-18 DIAGNOSIS — F41.1 GENERALIZED ANXIETY DISORDER: ICD-10-CM

## 2022-05-18 DIAGNOSIS — F32.1 MAJOR DEPRESSIVE DISORDER, SINGLE EPISODE, MODERATE (H): ICD-10-CM

## 2022-05-18 DIAGNOSIS — F90.0 ATTENTION DEFICIT HYPERACTIVITY DISORDER, PREDOMINANTLY INATTENTIVE TYPE: ICD-10-CM

## 2022-05-19 RX ORDER — DEXTROAMPHETAMINE SACCHARATE, AMPHETAMINE ASPARTATE MONOHYDRATE, DEXTROAMPHETAMINE SULFATE AND AMPHETAMINE SULFATE 5; 5; 5; 5 MG/1; MG/1; MG/1; MG/1
20 CAPSULE, EXTENDED RELEASE ORAL DAILY
Qty: 30 CAPSULE | Refills: 0 | Status: SHIPPED | OUTPATIENT
Start: 2022-05-19 | End: 2022-06-17

## 2022-05-19 RX ORDER — QUETIAPINE FUMARATE 50 MG/1
50 TABLET, FILM COATED ORAL AT BEDTIME
Qty: 90 TABLET | Refills: 0 | Status: SHIPPED | OUTPATIENT
Start: 2022-05-19 | End: 2022-06-17

## 2022-05-19 NOTE — TELEPHONE ENCOUNTER
"Requested Prescriptions   Pending Prescriptions Disp Refills     QUEtiapine (SEROQUEL) 50 MG tablet 90 tablet 0     Sig: Take 1 tablet (50 mg) by mouth At Bedtime       Antipsychotic Medications Failed - 5/18/2022  6:03 PM        Failed - Blood pressure under 140/90 in past 12 months     BP Readings from Last 3 Encounters:   01/15/17 136/73   01/07/17 123/90   05/18/16 127/83                 Failed - Lipid panel on file within the past 12 months     No lab results found.            Failed - CBC on file in past 12 months     Recent Labs   Lab Test 05/18/16  0840   WBC 10.4   RBC 4.72   HGB 15.0   HCT 43.3                    Failed - Heart Rate on file within past 12 months     Pulse Readings from Last 3 Encounters:   01/15/17 102   05/18/16 76   04/08/16 101               Failed - A1c or Glucose on file in past 12 months     Recent Labs   Lab Test 04/08/16  1748   GLC 84       Please review patients last 3 weights. If a weight gain of >10 lbs exists, you may refill the prescription once after instructing the patient to schedule an appointment within the next 30 days.    Wt Readings from Last 3 Encounters:   01/07/17 70.3 kg (155 lb)   05/18/16 70.3 kg (155 lb)   04/08/16 70.3 kg (155 lb)             Passed - Patient is 12 years of age or older        Passed - Medication is active on med list        Passed - Recent (6 mo) or future (30 days) visit within the authorizing provider's specialty     Patient had office visit in the last 6 months or has a visit in the next 30 days with authorizing provider or within the authorizing provider's specialty.  See \"Patient Info\" tab in inbasket, or \"Choose Columns\" in Meds & Orders section of the refill encounter.               amphetamine-dextroamphetamine (ADDERALL XR) 20 MG 24 hr capsule 30 capsule 0     Sig: Take 1 capsule (20 mg) by mouth daily       There is no refill protocol information for this order          "

## 2022-05-20 ENCOUNTER — E-VISIT (OUTPATIENT)
Dept: FAMILY MEDICINE | Facility: CLINIC | Age: 37
End: 2022-05-20
Payer: COMMERCIAL

## 2022-05-20 DIAGNOSIS — F43.23 ADJUSTMENT DISORDER WITH MIXED ANXIETY AND DEPRESSED MOOD: Primary | ICD-10-CM

## 2022-05-20 DIAGNOSIS — F32.1 MAJOR DEPRESSIVE DISORDER, SINGLE EPISODE, MODERATE (H): ICD-10-CM

## 2022-05-20 PROCEDURE — 99422 OL DIG E/M SVC 11-20 MIN: CPT | Performed by: FAMILY MEDICINE

## 2022-05-20 ASSESSMENT — ANXIETY QUESTIONNAIRES
GAD7 TOTAL SCORE: 6
8. IF YOU CHECKED OFF ANY PROBLEMS, HOW DIFFICULT HAVE THESE MADE IT FOR YOU TO DO YOUR WORK, TAKE CARE OF THINGS AT HOME, OR GET ALONG WITH OTHER PEOPLE?: SOMEWHAT DIFFICULT
4. TROUBLE RELAXING: SEVERAL DAYS
GAD7 TOTAL SCORE: 6
GAD7 TOTAL SCORE: 6
3. WORRYING TOO MUCH ABOUT DIFFERENT THINGS: SEVERAL DAYS
6. BECOMING EASILY ANNOYED OR IRRITABLE: SEVERAL DAYS
7. FEELING AFRAID AS IF SOMETHING AWFUL MIGHT HAPPEN: NOT AT ALL
5. BEING SO RESTLESS THAT IT IS HARD TO SIT STILL: SEVERAL DAYS
2. NOT BEING ABLE TO STOP OR CONTROL WORRYING: SEVERAL DAYS
1. FEELING NERVOUS, ANXIOUS, OR ON EDGE: SEVERAL DAYS
7. FEELING AFRAID AS IF SOMETHING AWFUL MIGHT HAPPEN: NOT AT ALL

## 2022-05-20 ASSESSMENT — PATIENT HEALTH QUESTIONNAIRE - PHQ9
SUM OF ALL RESPONSES TO PHQ QUESTIONS 1-9: 8
10. IF YOU CHECKED OFF ANY PROBLEMS, HOW DIFFICULT HAVE THESE PROBLEMS MADE IT FOR YOU TO DO YOUR WORK, TAKE CARE OF THINGS AT HOME, OR GET ALONG WITH OTHER PEOPLE: VERY DIFFICULT
SUM OF ALL RESPONSES TO PHQ QUESTIONS 1-9: 8

## 2022-05-21 ASSESSMENT — PATIENT HEALTH QUESTIONNAIRE - PHQ9: SUM OF ALL RESPONSES TO PHQ QUESTIONS 1-9: 8

## 2022-05-21 ASSESSMENT — ANXIETY QUESTIONNAIRES: GAD7 TOTAL SCORE: 6

## 2022-05-23 RX ORDER — BUPROPION HYDROCHLORIDE 150 MG/1
150 TABLET ORAL EVERY MORNING
Qty: 30 TABLET | Refills: 1 | Status: SHIPPED | OUTPATIENT
Start: 2022-05-23 | End: 2022-12-27

## 2022-06-17 ENCOUNTER — MYC REFILL (OUTPATIENT)
Dept: FAMILY MEDICINE | Facility: CLINIC | Age: 37
End: 2022-06-17
Payer: COMMERCIAL

## 2022-06-17 DIAGNOSIS — F90.0 ATTENTION DEFICIT HYPERACTIVITY DISORDER, PREDOMINANTLY INATTENTIVE TYPE: ICD-10-CM

## 2022-06-17 DIAGNOSIS — F41.1 GENERALIZED ANXIETY DISORDER: ICD-10-CM

## 2022-06-17 DIAGNOSIS — F32.1 MAJOR DEPRESSIVE DISORDER, SINGLE EPISODE, MODERATE (H): ICD-10-CM

## 2022-06-21 RX ORDER — DEXTROAMPHETAMINE SACCHARATE, AMPHETAMINE ASPARTATE MONOHYDRATE, DEXTROAMPHETAMINE SULFATE AND AMPHETAMINE SULFATE 5; 5; 5; 5 MG/1; MG/1; MG/1; MG/1
20 CAPSULE, EXTENDED RELEASE ORAL DAILY
Qty: 30 CAPSULE | Refills: 0 | Status: SHIPPED | OUTPATIENT
Start: 2022-06-21 | End: 2022-07-18

## 2022-06-21 RX ORDER — QUETIAPINE FUMARATE 50 MG/1
50 TABLET, FILM COATED ORAL AT BEDTIME
Qty: 90 TABLET | Refills: 0 | Status: SHIPPED | OUTPATIENT
Start: 2022-06-21 | End: 2022-07-18

## 2022-06-21 RX ORDER — ARIPIPRAZOLE 10 MG/1
10 TABLET ORAL DAILY
Qty: 90 TABLET | Refills: 0 | Status: SHIPPED | OUTPATIENT
Start: 2022-06-21 | End: 2022-07-18

## 2022-06-21 NOTE — TELEPHONE ENCOUNTER
Routing refill request to provider for review/approval because:  Labs not current:  Lipid, CBC, A1c, BP    Audelia Christensen RN   [facility]

## 2022-07-18 ENCOUNTER — MYC REFILL (OUTPATIENT)
Dept: FAMILY MEDICINE | Facility: CLINIC | Age: 37
End: 2022-07-18

## 2022-07-18 DIAGNOSIS — F41.1 GENERALIZED ANXIETY DISORDER: ICD-10-CM

## 2022-07-18 DIAGNOSIS — F32.1 MAJOR DEPRESSIVE DISORDER, SINGLE EPISODE, MODERATE (H): ICD-10-CM

## 2022-07-18 DIAGNOSIS — F90.0 ATTENTION DEFICIT HYPERACTIVITY DISORDER, PREDOMINANTLY INATTENTIVE TYPE: ICD-10-CM

## 2022-07-19 RX ORDER — DEXTROAMPHETAMINE SACCHARATE, AMPHETAMINE ASPARTATE MONOHYDRATE, DEXTROAMPHETAMINE SULFATE AND AMPHETAMINE SULFATE 5; 5; 5; 5 MG/1; MG/1; MG/1; MG/1
20 CAPSULE, EXTENDED RELEASE ORAL DAILY
Qty: 30 CAPSULE | Refills: 0 | Status: SHIPPED | OUTPATIENT
Start: 2022-07-19 | End: 2022-08-22

## 2022-07-19 RX ORDER — QUETIAPINE FUMARATE 50 MG/1
50 TABLET, FILM COATED ORAL AT BEDTIME
Qty: 90 TABLET | Refills: 0 | Status: SHIPPED | OUTPATIENT
Start: 2022-07-19 | End: 2022-10-25

## 2022-07-19 RX ORDER — ARIPIPRAZOLE 10 MG/1
10 TABLET ORAL DAILY
Qty: 90 TABLET | Refills: 0 | Status: SHIPPED | OUTPATIENT
Start: 2022-07-19 | End: 2022-10-25

## 2022-08-22 ENCOUNTER — MYC REFILL (OUTPATIENT)
Dept: FAMILY MEDICINE | Facility: CLINIC | Age: 37
End: 2022-08-22

## 2022-08-22 DIAGNOSIS — F90.0 ATTENTION DEFICIT HYPERACTIVITY DISORDER, PREDOMINANTLY INATTENTIVE TYPE: ICD-10-CM

## 2022-08-23 RX ORDER — DEXTROAMPHETAMINE SACCHARATE, AMPHETAMINE ASPARTATE MONOHYDRATE, DEXTROAMPHETAMINE SULFATE AND AMPHETAMINE SULFATE 5; 5; 5; 5 MG/1; MG/1; MG/1; MG/1
20 CAPSULE, EXTENDED RELEASE ORAL DAILY
Qty: 30 CAPSULE | Refills: 0 | Status: SHIPPED | OUTPATIENT
Start: 2022-08-23 | End: 2022-08-29

## 2022-08-29 ENCOUNTER — TELEPHONE (OUTPATIENT)
Dept: FAMILY MEDICINE | Facility: CLINIC | Age: 37
End: 2022-08-29

## 2022-08-29 DIAGNOSIS — F90.0 ATTENTION DEFICIT HYPERACTIVITY DISORDER, PREDOMINANTLY INATTENTIVE TYPE: ICD-10-CM

## 2022-08-29 RX ORDER — DEXTROAMPHETAMINE SACCHARATE, AMPHETAMINE ASPARTATE MONOHYDRATE, DEXTROAMPHETAMINE SULFATE AND AMPHETAMINE SULFATE 5; 5; 5; 5 MG/1; MG/1; MG/1; MG/1
20 CAPSULE, EXTENDED RELEASE ORAL DAILY
Qty: 30 CAPSULE | Refills: 0 | Status: SHIPPED | OUTPATIENT
Start: 2022-08-29 | End: 2022-09-27

## 2022-08-29 NOTE — TELEPHONE ENCOUNTER
Needs Adderall prescription resent to [id number] - [facility] [id number]        Unable to get to the other pharmacy

## 2022-09-27 ENCOUNTER — MYC REFILL (OUTPATIENT)
Dept: FAMILY MEDICINE | Facility: CLINIC | Age: 37
End: 2022-09-27

## 2022-09-27 DIAGNOSIS — F90.0 ATTENTION DEFICIT HYPERACTIVITY DISORDER, PREDOMINANTLY INATTENTIVE TYPE: ICD-10-CM

## 2022-09-28 RX ORDER — DEXTROAMPHETAMINE SACCHARATE, AMPHETAMINE ASPARTATE MONOHYDRATE, DEXTROAMPHETAMINE SULFATE AND AMPHETAMINE SULFATE 5; 5; 5; 5 MG/1; MG/1; MG/1; MG/1
20 CAPSULE, EXTENDED RELEASE ORAL DAILY
Qty: 30 CAPSULE | Refills: 0 | Status: SHIPPED | OUTPATIENT
Start: 2022-09-28 | End: 2022-10-25

## 2022-10-09 ENCOUNTER — HEALTH MAINTENANCE LETTER (OUTPATIENT)
Age: 37
End: 2022-10-09

## 2022-10-25 ENCOUNTER — MYC REFILL (OUTPATIENT)
Dept: FAMILY MEDICINE | Facility: CLINIC | Age: 37
End: 2022-10-25

## 2022-10-25 DIAGNOSIS — F41.1 GENERALIZED ANXIETY DISORDER: ICD-10-CM

## 2022-10-25 DIAGNOSIS — F90.0 ATTENTION DEFICIT HYPERACTIVITY DISORDER, PREDOMINANTLY INATTENTIVE TYPE: ICD-10-CM

## 2022-10-25 DIAGNOSIS — F32.1 MAJOR DEPRESSIVE DISORDER, SINGLE EPISODE, MODERATE (H): ICD-10-CM

## 2022-10-26 ENCOUNTER — TELEPHONE (OUTPATIENT)
Dept: FAMILY MEDICINE | Facility: CLINIC | Age: 37
End: 2022-10-26

## 2022-10-26 DIAGNOSIS — F41.1 GENERALIZED ANXIETY DISORDER: ICD-10-CM

## 2022-10-26 DIAGNOSIS — F32.1 MAJOR DEPRESSIVE DISORDER, SINGLE EPISODE, MODERATE (H): ICD-10-CM

## 2022-10-26 RX ORDER — QUETIAPINE FUMARATE 50 MG/1
50 TABLET, FILM COATED ORAL AT BEDTIME
Qty: 90 TABLET | Refills: 0 | Status: SHIPPED | OUTPATIENT
Start: 2022-10-26 | End: 2023-01-26

## 2022-10-26 RX ORDER — DEXTROAMPHETAMINE SACCHARATE, AMPHETAMINE ASPARTATE MONOHYDRATE, DEXTROAMPHETAMINE SULFATE AND AMPHETAMINE SULFATE 5; 5; 5; 5 MG/1; MG/1; MG/1; MG/1
20 CAPSULE, EXTENDED RELEASE ORAL DAILY
Qty: 30 CAPSULE | Refills: 0 | Status: SHIPPED | OUTPATIENT
Start: 2022-10-31 | End: 2022-11-29

## 2022-10-26 RX ORDER — ARIPIPRAZOLE 10 MG/1
10 TABLET ORAL DAILY
Qty: 90 TABLET | Refills: 0 | Status: SHIPPED | OUTPATIENT
Start: 2022-10-26 | End: 2022-10-27

## 2022-10-26 NOTE — TELEPHONE ENCOUNTER
Refills sent to pharmacy.  Please call patient to advise him to set up a visit as his last was in February.    Washington Wynn MD

## 2022-10-26 NOTE — TELEPHONE ENCOUNTER
Pt calling in. Adderall start date in 10/31/22. Pt is going out of town tomorrow and needs this filled today. Last  was 09/28/2022.    Carlee Velazquez RN  [facility]    Called pt and informed his that is prescription is available for pick-up.     Carlee Velazquez RN  [facility]

## 2022-10-26 NOTE — TELEPHONE ENCOUNTER
It would be nice to know that when an early refill is requested but that information was not in the original request.    Ok to call the pharmacy and let them know the date on the script was in error and patient needs to  at this time due to travel that we did not know about when the script was sent over.    Thanks,  Washington Wynn MD

## 2022-10-26 NOTE — TELEPHONE ENCOUNTER
Called pharmacy to relay message from PCP.    Ok to call the pharmacy and let them know the date on the script was in error and patient needs to  at this time due to travel that we did not know about when the script was sent over.     Thanks,  Washington Velazquez, RN  [facility]

## 2022-10-26 NOTE — TELEPHONE ENCOUNTER
Last visit 2-22-22  Attention deficit hyperactivity disorder, predominantly inattentive type  Patient did provide records documenting the issuance of Adderall prescriptions from a provider in [place].  Therefore I have agreed to take over prescribing at this time.  We will continue the 20 mg dose daily that he was getting previously and worked well for him.  I have suggested a follow-up in the clinic in 1 to 2 months so we can complete controlled substance agreement.  - amphetamine-dextroamphetamine (ADDERALL XR) 20 MG 24 hr capsule; Take 1 capsule (20 mg) by mouth daily     Major depressive disorder, single episode, moderate (H)  Refills of the quetiapine and aripiprazole were due at this time.  Patient continues to endorse that his symptoms of depression and anxiety are improved at this time.  - QUEtiapine (SEROQUEL) 50 MG tablet; Take 1 tablet (50 mg) by mouth At Bedtime  - ARIPiprazole (ABILIFY) 10 MG tablet; Take 1 tablet (10 mg) by mouth daily     Generalized anxiety disorder  As above.  - QUEtiapine (SEROQUEL) 50 MG tablet; Take 1 tablet (50 mg) by mouth At Bedtime  - ARIPiprazole (ABILIFY) 10 MG tablet; Take 1 tablet (10 mg) by mouth daily

## 2022-10-27 RX ORDER — ARIPIPRAZOLE 20 MG/1
10 TABLET ORAL DAILY
Qty: 45 TABLET | Refills: 1 | Status: SHIPPED | OUTPATIENT
Start: 2022-10-27 | End: 2023-08-01

## 2022-10-27 NOTE — TELEPHONE ENCOUNTER
Central Prior Authorization Team   Phone: [phone]      PER LIBORIO AT [facility] 10MG TABLETS ARE NOT COVERED AT ONE TABLET PER DAY. COVERED ALTERNATIVE IS 20 MG TABLETS AT HALF TAB PER DAY.

## 2022-11-07 ENCOUNTER — HOSPITAL ENCOUNTER (EMERGENCY)
Facility: CLINIC | Age: 37
Discharge: HOME | End: 2022-11-07
Attending: PHYSICIAN ASSISTANT | Admitting: PHYSICIAN ASSISTANT
Payer: COMMERCIAL

## 2022-11-07 VITALS
RESPIRATION RATE: 18 BRPM | BODY MASS INDEX: 23.73 KG/M2 | WEIGHT: 175 LBS | OXYGEN SATURATION: 100 % | SYSTOLIC BLOOD PRESSURE: 129 MMHG | TEMPERATURE: 99 F | HEART RATE: 110 BPM | DIASTOLIC BLOOD PRESSURE: 90 MMHG

## 2022-11-07 DIAGNOSIS — R30.0 DYSURIA: ICD-10-CM

## 2022-11-07 LAB
ALBUMIN UR-MCNC: 30 MG/DL
ANION GAP SERPL CALCULATED.3IONS-SCNC: 10 MMOL/L (ref 7–15)
APPEARANCE UR: CLEAR
BASOPHILS # BLD AUTO: 0.1 10E3/UL (ref 0–0.2)
BASOPHILS NFR BLD AUTO: 1 %
BILIRUB UR QL STRIP: NEGATIVE
BUN SERPL-MCNC: 13.5 MG/DL (ref 6–20)
CALCIUM SERPL-MCNC: 9.7 MG/DL (ref 8.6–10)
CHLORIDE SERPL-SCNC: 106 MMOL/L (ref 98–107)
COLOR UR AUTO: YELLOW
CREAT SERPL-MCNC: 0.79 MG/DL (ref 0.67–1.17)
CRP SERPL-MCNC: <3 MG/L
DEPRECATED HCO3 PLAS-SCNC: 23 MMOL/L (ref 22–29)
EOSINOPHIL # BLD AUTO: 0.1 10E3/UL (ref 0–0.7)
EOSINOPHIL NFR BLD AUTO: 2 %
ERYTHROCYTE [DISTWIDTH] IN BLOOD BY AUTOMATED COUNT: 12.4 % (ref 10–15)
GFR SERPL CREATININE-BSD FRML MDRD: >90 ML/MIN/1.73M2
GLUCOSE SERPL-MCNC: 100 MG/DL (ref 70–99)
GLUCOSE UR STRIP-MCNC: NEGATIVE MG/DL
HCT VFR BLD AUTO: 43.9 % (ref 40–53)
HGB BLD-MCNC: 14.1 G/DL (ref 13.3–17.7)
HGB UR QL STRIP: NEGATIVE
IMM GRANULOCYTES # BLD: 0 10E3/UL
IMM GRANULOCYTES NFR BLD: 0 %
KETONES UR STRIP-MCNC: NEGATIVE MG/DL
LEUKOCYTE ESTERASE UR QL STRIP: NEGATIVE
LYMPHOCYTES # BLD AUTO: 2.2 10E3/UL (ref 0.8–5.3)
LYMPHOCYTES NFR BLD AUTO: 23 %
MCH RBC QN AUTO: 29.9 PG (ref 26.5–33)
MCHC RBC AUTO-ENTMCNC: 32.1 G/DL (ref 31.5–36.5)
MCV RBC AUTO: 93 FL (ref 78–100)
MONOCYTES # BLD AUTO: 0.6 10E3/UL (ref 0–1.3)
MONOCYTES NFR BLD AUTO: 7 %
MUCOUS THREADS #/AREA URNS LPF: PRESENT /LPF
NEUTROPHILS # BLD AUTO: 6.5 10E3/UL (ref 1.6–8.3)
NEUTROPHILS NFR BLD AUTO: 67 %
NITRATE UR QL: NEGATIVE
NRBC # BLD AUTO: 0 10E3/UL
NRBC BLD AUTO-RTO: 0 /100
PH UR STRIP: 6 [PH] (ref 5–7)
PLATELET # BLD AUTO: 257 10E3/UL (ref 150–450)
POTASSIUM SERPL-SCNC: 4.1 MMOL/L (ref 3.4–5.3)
RBC # BLD AUTO: 4.71 10E6/UL (ref 4.4–5.9)
RBC URINE: 2 /HPF
SODIUM SERPL-SCNC: 139 MMOL/L (ref 136–145)
SP GR UR STRIP: 1.03 (ref 1–1.03)
UROBILINOGEN UR STRIP-MCNC: NORMAL MG/DL
WBC # BLD AUTO: 9.5 10E3/UL (ref 4–11)
WBC URINE: 1 /HPF

## 2022-11-07 PROCEDURE — 99283 EMERGENCY DEPT VISIT LOW MDM: CPT

## 2022-11-07 PROCEDURE — 87591 N.GONORRHOEAE DNA AMP PROB: CPT | Performed by: PHYSICIAN ASSISTANT

## 2022-11-07 PROCEDURE — 85025 COMPLETE CBC W/AUTO DIFF WBC: CPT | Performed by: PHYSICIAN ASSISTANT

## 2022-11-07 PROCEDURE — 87491 CHLMYD TRACH DNA AMP PROBE: CPT | Performed by: PHYSICIAN ASSISTANT

## 2022-11-07 PROCEDURE — 80048 BASIC METABOLIC PNL TOTAL CA: CPT | Performed by: PHYSICIAN ASSISTANT

## 2022-11-07 PROCEDURE — 36415 COLL VENOUS BLD VENIPUNCTURE: CPT | Performed by: PHYSICIAN ASSISTANT

## 2022-11-07 PROCEDURE — 86140 C-REACTIVE PROTEIN: CPT | Performed by: PHYSICIAN ASSISTANT

## 2022-11-07 PROCEDURE — 81001 URINALYSIS AUTO W/SCOPE: CPT | Performed by: PHYSICIAN ASSISTANT

## 2022-11-07 ASSESSMENT — ACTIVITIES OF DAILY LIVING (ADL): ADLS_ACUITY_SCORE: 33

## 2022-11-07 ASSESSMENT — ENCOUNTER SYMPTOMS: DYSURIA: 1

## 2022-11-07 NOTE — ED TRIAGE NOTES
Presents to ED with c/o painful urination x 4 days. Denies all other symptoms.      Triage Assessment     Row Name 11/07/22 3686       Triage Assessment (Adult)    Airway WDL WDL       Respiratory WDL    Respiratory WDL WDL       Cardiac WDL    Cardiac WDL X;rhythm    Pulse Rate & Regularity tachycardic

## 2022-11-08 LAB
C TRACH DNA SPEC QL NAA+PROBE: NEGATIVE
N GONORRHOEA DNA SPEC QL NAA+PROBE: NEGATIVE

## 2022-11-08 NOTE — ED PROVIDER NOTES
History     Chief Complaint:  Rule out Urinary Tract Infection       HPI   Fuentes Zamora is a 37 year old male who presents with dysuria and fevers. Symptoms have been present for 4 days. Patient states he has irritation when he pees especially at the tip of the penis. No gross hematuria. No known current or history STD exposure. Patient is also endorsing intermittent fevers. No medications prior to arrival. No abdominal or back pain.    ROS:  Review of Systems   Genitourinary: Positive for dysuria.   All other systems reviewed and are negative.      Allergies:  No Known Allergies     Medications:    amphetamine-dextroamphetamine (ADDERALL XR) 20 MG 24 hr capsule  ARIPiprazole (ABILIFY) 20 MG tablet  buPROPion (WELLBUTRIN XL) 150 MG 24 hr tablet  QUEtiapine (SEROQUEL) 50 MG tablet        Past Medical History:    Past Medical History:   Diagnosis Date     Anxiety      Depressive disorder      Major depressive disorder with single episode      Other acne        Past Surgical History:    Past Surgical History:   Procedure Laterality Date     HC TOOTH EXTRACTION W/FORCEP      Under general anesthesia        Family History:    family history includes Anxiety Disorder in his mother and sister; Bipolar Disorder in his sister; Cancer in his mother; Depression in his mother and sister; Substance Abuse in his father and sister.    Social History:   reports that he has been smoking cigarettes. He has been smoking an average of .5 packs per day. He has never used smokeless tobacco. He reports current alcohol use. He reports that he does not use drugs.  PCP: [facility] (Inactive)     Physical Exam     Patient Vitals for the past 24 hrs:   BP Temp Temp src Pulse Resp SpO2 Weight   11/07/22 1714 (!) 129/90 99  F (37.2  C) Temporal 110 18 100 % 79.4 kg (175 lb)        Physical Exam  Constitutional: Alert, attentive, GCS 15  HENT:    Nose: Nose normal.    Mouth/Throat: Oropharynx is clear, mucous membranes are  moist   Eyes: EOM are normal.   CV: regular rate and rhythm; no murmurs, rubs or gallups  Chest: Effort normal and breath sounds normal.   GI:  There is no tenderness. No distension. Normal bowel sounds.  : No CVA tenderness  MSK: Normal range of motion. No back tenderness.  Neurological: Alert, attentive  Skin: Skin is warm and dry.      Emergency Department Course     Laboratory:  Labs Ordered and Resulted from Time of ED Arrival to Time of ED Departure   ROUTINE UA WITH MICROSCOPIC REFLEX TO CULTURE - Abnormal       Result Value    Color Urine Yellow      Appearance Urine Clear      Glucose Urine Negative      Bilirubin Urine Negative      Ketones Urine Negative      Specific Gravity Urine 1.030      Blood Urine Negative      pH Urine 6.0      Protein Albumin Urine 30 (*)     Urobilinogen Urine Normal      Nitrite Urine Negative      Leukocyte Esterase Urine Negative      Mucus Urine Present (*)     RBC Urine 2      WBC Urine 1     BASIC METABOLIC PANEL - Abnormal    Sodium 139      Potassium 4.1      Chloride 106      Carbon Dioxide (CO2) 23      Anion Gap 10      Urea Nitrogen 13.5      Creatinine 0.79      Calcium 9.7      Glucose 100 (*)     GFR Estimate >90     CRP INFLAMMATION - Normal    CRP Inflammation <3.00     CBC WITH PLATELETS AND DIFFERENTIAL    WBC Count 9.5      RBC Count 4.71      Hemoglobin 14.1      Hematocrit 43.9      MCV 93      MCH 29.9      MCHC 32.1      RDW 12.4      Platelet Count 257      % Neutrophils 67      % Lymphocytes 23      % Monocytes 7      % Eosinophils 2      % Basophils 1      % Immature Granulocytes 0      NRBCs per 100 WBC 0      Absolute Neutrophils 6.5      Absolute Lymphocytes 2.2      Absolute Monocytes 0.6      Absolute Eosinophils 0.1      Absolute Basophils 0.1      Absolute Immature Granulocytes 0.0      Absolute NRBCs 0.0     CHLAMYDIA TRACHOMATIS PCR   NEISSERIA GONORRHOEAE PCR      Emergency Department Course:    Reviewed:  I reviewed nursing notes, vitals  and past medical history    Assessments:  1945 I obtained history and examined the patient as noted above.   2120 I rechecked the patient and explained laboratory findings.    Interventions:  Medications - No data to display     Disposition:  The patient was discharged to home.     Impression & Plan    CMS Diagnoses: None    Medical Decision Making:  Patient is a well-appearing 38 yo M who presents for 4 days of dysuria and subjective fevers. Vital signs are appropriate. He is afebrile in the department. Physical examination in unremarkable. DDX includes UTI, STD, pyelonephritis, prostatitis.  UA is negative for infection or hematuria. Preliminary labs are within normal limits. No evidence of leukocytosis.  With negative labs and normal physical examination, no clear etiology is found for patient's symptoms. No evidence of systemic infection. Urine sample will be sent for gonorrhea and chlamydia testing however patient denies recent exposure to STDs and his sexual partner is asymptomatic. No empiric antibiotics will be started. Patient should follow-up with primary care over the next week. Supportive cares and return precautions to ED discussed. Patient expressed understanding of plan and was ready for discharge.    Diagnosis:    ICD-10-CM    1. Dysuria  R30.0            Discharge Medications:  Discharge Medication List as of 11/7/2022  9:57 PM           11/7/2022   Tessa Gaspar PA-C Steinbrueck, Emily, PA-C  11/07/22 9539

## 2022-11-08 NOTE — DISCHARGE INSTRUCTIONS
You were seen in the Emergency Department. Your labs are all reassuring. Continue to monitor your symptoms and increase your daily water intake. Follow-up with primary care this week. You will receive a phone call if your STD results are positive. For new or worsening symptoms, return to Emergency Department.

## 2022-11-08 NOTE — RESULT ENCOUNTER NOTE
Final result for both N. Gonorrhoeae PCR and Chlamydia Trachomatis PCR are NEGATIVE.  No treatment or change in treatment per [facility] ED Lab Result N. Gonorrhea AND/OR Chlamydia T. protocol.

## 2022-11-29 ENCOUNTER — MYC REFILL (OUTPATIENT)
Dept: FAMILY MEDICINE | Facility: CLINIC | Age: 37
End: 2022-11-29

## 2022-11-29 DIAGNOSIS — F90.0 ATTENTION DEFICIT HYPERACTIVITY DISORDER, PREDOMINANTLY INATTENTIVE TYPE: ICD-10-CM

## 2022-11-30 RX ORDER — DEXTROAMPHETAMINE SACCHARATE, AMPHETAMINE ASPARTATE MONOHYDRATE, DEXTROAMPHETAMINE SULFATE AND AMPHETAMINE SULFATE 5; 5; 5; 5 MG/1; MG/1; MG/1; MG/1
20 CAPSULE, EXTENDED RELEASE ORAL DAILY
Qty: 30 CAPSULE | Refills: 0 | Status: SHIPPED | OUTPATIENT
Start: 2022-11-30 | End: 2022-12-27

## 2022-11-30 RX ORDER — DEXTROAMPHETAMINE SACCHARATE, AMPHETAMINE ASPARTATE MONOHYDRATE, DEXTROAMPHETAMINE SULFATE AND AMPHETAMINE SULFATE 5; 5; 5; 5 MG/1; MG/1; MG/1; MG/1
20 CAPSULE, EXTENDED RELEASE ORAL DAILY
Qty: 30 CAPSULE | Refills: 0 | Status: SHIPPED | OUTPATIENT
Start: 2022-11-30 | End: 2022-11-30

## 2022-11-30 NOTE — TELEPHONE ENCOUNTER
Patient called stating he is out of town in [place] and requesting medication be sent to new pharmacy

## 2022-11-30 NOTE — TELEPHONE ENCOUNTER
Requested Prescriptions   Pending Prescriptions Disp Refills     amphetamine-dextroamphetamine (ADDERALL XR) 20 MG 24 hr capsule 30 capsule 0     Sig: Take 1 capsule (20 mg) by mouth daily       There is no refill protocol information for this order        Routing refill request to provider for review/approval because:  Drug not on the [facility] refill protocol     Pt has an appointment on 12/27/22      Carlee Velazquez RN  [facility]

## 2022-12-27 ENCOUNTER — OFFICE VISIT (OUTPATIENT)
Dept: FAMILY MEDICINE | Facility: CLINIC | Age: 37
End: 2022-12-27
Payer: COMMERCIAL

## 2022-12-27 VITALS
TEMPERATURE: 96.9 F | WEIGHT: 177 LBS | DIASTOLIC BLOOD PRESSURE: 82 MMHG | OXYGEN SATURATION: 99 % | HEART RATE: 106 BPM | RESPIRATION RATE: 17 BRPM | HEIGHT: 73 IN | BODY MASS INDEX: 23.46 KG/M2 | SYSTOLIC BLOOD PRESSURE: 132 MMHG

## 2022-12-27 DIAGNOSIS — F32.1 MAJOR DEPRESSIVE DISORDER, SINGLE EPISODE, MODERATE (H): ICD-10-CM

## 2022-12-27 DIAGNOSIS — F90.0 ATTENTION DEFICIT HYPERACTIVITY DISORDER, PREDOMINANTLY INATTENTIVE TYPE: Primary | ICD-10-CM

## 2022-12-27 DIAGNOSIS — Z59.819 HOUSING INSTABILITY: ICD-10-CM

## 2022-12-27 DIAGNOSIS — F41.1 GENERALIZED ANXIETY DISORDER: ICD-10-CM

## 2022-12-27 DIAGNOSIS — Z23 NEED FOR PROPHYLACTIC VACCINATION AND INOCULATION AGAINST INFLUENZA: ICD-10-CM

## 2022-12-27 PROCEDURE — 90686 IIV4 VACC NO PRSV 0.5 ML IM: CPT | Performed by: FAMILY MEDICINE

## 2022-12-27 PROCEDURE — 99214 OFFICE O/P EST MOD 30 MIN: CPT | Mod: 25 | Performed by: FAMILY MEDICINE

## 2022-12-27 PROCEDURE — 90471 IMMUNIZATION ADMIN: CPT | Performed by: FAMILY MEDICINE

## 2022-12-27 RX ORDER — CITALOPRAM HYDROBROMIDE 20 MG/1
20 TABLET ORAL DAILY
Qty: 30 TABLET | Refills: 2 | Status: SHIPPED | OUTPATIENT
Start: 2022-12-27 | End: 2023-03-26

## 2022-12-27 RX ORDER — DEXTROAMPHETAMINE SACCHARATE, AMPHETAMINE ASPARTATE MONOHYDRATE, DEXTROAMPHETAMINE SULFATE AND AMPHETAMINE SULFATE 5; 5; 5; 5 MG/1; MG/1; MG/1; MG/1
20 CAPSULE, EXTENDED RELEASE ORAL DAILY
Qty: 30 CAPSULE | Refills: 0 | Status: SHIPPED | OUTPATIENT
Start: 2022-12-27 | End: 2023-01-26

## 2022-12-27 SDOH — ECONOMIC STABILITY - HOUSING INSECURITY: HOUSING INSTABILITY UNSPECIFIED: Z59.819

## 2022-12-27 ASSESSMENT — PATIENT HEALTH QUESTIONNAIRE - PHQ9
SUM OF ALL RESPONSES TO PHQ QUESTIONS 1-9: 6
SUM OF ALL RESPONSES TO PHQ QUESTIONS 1-9: 6
10. IF YOU CHECKED OFF ANY PROBLEMS, HOW DIFFICULT HAVE THESE PROBLEMS MADE IT FOR YOU TO DO YOUR WORK, TAKE CARE OF THINGS AT HOME, OR GET ALONG WITH OTHER PEOPLE: SOMEWHAT DIFFICULT

## 2022-12-27 ASSESSMENT — PAIN SCALES - GENERAL: PAINLEVEL: NO PAIN (0)

## 2022-12-27 NOTE — PATIENT INSTRUCTIONS
For the citalopram, take 1/2 tablet (10 mg) daily for the first 10-14 days and if tolerating ok, then increase to 20 mg daily.

## 2022-12-27 NOTE — PROGRESS NOTES
Assessment & Plan     Attention deficit hyperactivity disorder, predominantly inattentive type  Refill of this medication was provided today.   was reviewed and appropriate.  - REVIEW OF HEALTH MAINTENANCE PROTOCOL ORDERS  - amphetamine-dextroamphetamine (ADDERALL XR) 20 MG 24 hr capsule; Take 1 capsule (20 mg) by mouth daily    Major depressive disorder, single episode, moderate (H)  Patient feels his depressive symptoms have worsened recently which she attributes to some situational issues regarding loss of job, and potential loss of housing.  He is currently living with his sister after spending a month in [place] with his parents.  He has tried a number of antidepressants in the past.  Recently had been on bupropion but did not found that effective.  Sertraline and Paxil were also ineffective in the past.  Have suggested a trial of citalopram.  Side effects were reviewed with the patient.  Suggested starting with half tablet daily for the first 10 to 14 days and then he could increase to a whole tablet daily with follow-up in about 6 weeks for recheck.  - citalopram (CELEXA) 20 MG tablet; Take 1 tablet (20 mg) by mouth daily    Generalized anxiety disorder  As above.    Need for prophylactic vaccination and inoculation against influenza  Flu shot was provided today.  - INFLUENZA VACCINE IM > 6 MONTHS VALENT IIV4 (AFLURIA/FLUZONE)    Housing instability  I offered a care coordination referral lately due to the situational changes in his situation and he was open to discussing resources with them.  - Primary Care - Care Coordination Referral; Future             Nicotine/Tobacco Cessation:  He reports that he has been smoking vaping device. He has never used smokeless tobacco.  Nicotine/Tobacco Cessation Plan:   Information offered: Patient not interested at this time          Return in about 6 weeks (around 2/7/2023) for Mood Recheck, video visit.    Washington Wynn MD  [facility]  DIMA Dill is a 37 year old presenting for the following health issues:    Recheck Medication (Doing virtual- came in to see in person ) and Imm/Inj (Flu Shot)      History of Present Illness       Mental Health Follow-up:  Patient presents to follow-up on Depression.Patient's depression since last visit has been:  Worse  The patient is not having other symptoms associated with depression.      Any significant life events: financial concerns and housing concerns  Patient is not feeling anxious or having panic attacks.  Patient has no concerns about alcohol or drug use.    He eats 0-1 servings of fruits and vegetables daily.He consumes 1 sweetened beverage(s) daily.He exercises with enough effort to increase his heart rate 20 to 29 minutes per day.  He exercises with enough effort to increase his heart rate 4 days per week. He is missing 1 dose(s) of medications per week.  He is not taking prescribed medications regularly due to remembering to take.    Today's PHQ-9         PHQ-9 Total Score: 6    PHQ-9 Q9 Thoughts of better off dead/self-harm past 2 weeks :   Not at all    How difficult have these problems made it for you to do your work, take care of things at home, or get along with other people: Somewhat difficult         Patient is here for follow-up.  He is feeling well at this time but feels that his symptoms of depression have worsened due to some situational issues.  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  He feels this has caused an increase in symptoms of anxiety and depression.  He moved down to [place] and spent a month with his parents but said that was not very healthy situation for him.  He is now living with his sister.    Review of Systems   Constitutional, HEENT, cardiovascular, pulmonary, gi and gu systems are negative, except as otherwise noted.      Objective    /82 (BP Location: Right arm, Patient Position: Sitting, Cuff Size: Adult Regular)  "  Pulse 106   Temp 96.9  F (36.1  C) (Temporal)   Resp 17   Ht 1.854 m (6' 1\")   Wt 80.3 kg (177 lb)   SpO2 99%   BMI 23.35 kg/m    Body mass index is 23.35 kg/m .  Physical Exam   GENERAL: healthy, alert and no distress  EYES: Eyes grossly normal to inspection, PERRL and conjunctivae and sclerae normal  HENT: ear canals and TM's normal, nose and mouth without ulcers or lesions  NECK: no adenopathy, no asymmetry, masses, or scars and thyroid normal to palpation  RESP: lungs clear to auscultation - no rales, rhonchi or wheezes  CV: regular rate and rhythm, normal S1 S2, no S3 or S4, no murmur, click or rub, no peripheral edema and peripheral pulses strong  MS: no gross musculoskeletal defects noted, no edema  NEURO: Normal strength and tone, mentation intact and speech normal  PSYCH: mentation appears normal, affect normal/bright                    "

## 2022-12-28 ENCOUNTER — PATIENT OUTREACH (OUTPATIENT)
Dept: CARE COORDINATION | Facility: CLINIC | Age: 37
End: 2022-12-28

## 2022-12-28 NOTE — PROGRESS NOTES
Clinic Care Coordination Contact  Community Health Worker Initial Outreach    Chart Review: PCP referral from Dr. Wynn on 12/18/22.     Financial Support - food, housing    Resources for Transportation    Spoke with pt this morning:    Pt has returned from living with parents in [place] for one month (per 12/27/22 OV note from Dr. Wynn) and is living with his sister/ for a short time    Pt is not working, but has an interview on 12/30/22    Housing is pt's biggest concern as this is affecting his MH presently    CHW [place], eligible as of 11/1/21 with no end date.    CHW Initial Information Gathering:  Referral Source: PCP  Current living arrangement:: Other (Lives temporarily with sister and her )  Type of residence:: Apartment  Community Resources: [facility] (Stopped getting SNAP, Insurance)  Supplies Currently Used at Home: None  Equipment Currently Used at Home: none  Informal Support system:: Family (Sister)  No PCP office visit in Past Year: No  Transportation means:: Family, Friend  CHW Additional Questions  If ED/Hospital discharge, follow-up appointment scheduled as recommended?: N/A  Medication changes made following ED/Hospital discharge?: N/A  MyChart active?: Yes  Patient sent Social Determinants of Health questionnaire?: Yes    Patient accepts CC: Yes. Patient scheduled for assessment with ROB Zamorano , on 12/29/22 at 9:00am. Patient noted desire to discuss housing, MH resources.     Debora Sánchez  Community Health Worker  [facility]  [phone]

## 2022-12-29 ENCOUNTER — PATIENT OUTREACH (OUTPATIENT)
Dept: CARE COORDINATION | Facility: CLINIC | Age: 37
End: 2022-12-29

## 2022-12-29 ENCOUNTER — APPOINTMENT (OUTPATIENT)
Dept: NURSING | Facility: CLINIC | Age: 37
End: 2022-12-29
Payer: COMMERCIAL

## 2022-12-29 ENCOUNTER — TELEPHONE (OUTPATIENT)
Dept: BEHAVIORAL HEALTH | Facility: CLINIC | Age: 37
End: 2022-12-29

## 2022-12-29 ASSESSMENT — ACTIVITIES OF DAILY LIVING (ADL): DEPENDENT_IADLS:: INDEPENDENT

## 2022-12-29 NOTE — TELEPHONE ENCOUNTER
First attempt to contact pt.  left a VM with TC contact info and encouraged a phone call back to schedule initial therapy appointment. [facility] will postpone for tomorrow.    Luz Marina Garcia  12/29/22  1013    ----- Message from REESE Smith sent at 12/29/2022  9:48 AM CST -----  Regarding: Therapy referral  Transition Clinic Referral   [place] (Limited)        Please Check Type of Referral Requested:       ___X_THERAPY: The Transition clinic is able to schedule patients without current medical insurance; these patient will be referred to our Social Work Care Coordinator for Medical Insurance              Assistance. We are open for referral for psychotherapy. Patient is referred from:  [facility]      ____MEDICATION:  Referrals for Medication are ONLY accepted from the following areas (select):        Referring Provider Contact Name: Lona Lopez; Phone Number: [phone]    Reason for Transition Clinic Referral: High level of anxiety and depression, no current provider    Next Level of Care Patient Will Be Transitioned To: N/A  Provider(s)N/A  Location N/A  Date/Time N/A    What Would Be Helpful from the Transition Clinic: Therapist     Needs: NO    Does Patient Have Access to Technology: yes    Patient E-mail Address: [email or web address]    Current Patient Phone Number: [phone]    Clinician Gender Preference (if applicable): NO    Patient location preference: [place]

## 2022-12-29 NOTE — LETTER
[facility] CARE COORDINATION  [id number]  [facility] [address]    December 29, 2022    Fuentes ANTHONY Egsgaard  [address]  [place] [place] [facility] [address]      Dear Fuentes,        I am a clinic care coordinator who works with [facility] (Inactive) with the [facility]. I wanted to thank you for spending the time to talk with me.  Below is a description of clinic care coordination and how I can further assist you.       The clinic care coordination team is made up of a registered nurse, , financial resource worker and community health worker who understand the health care system. The goal of clinic care coordination is to help you manage your health and improve access to the health care system. Our team works alongside your provider to assist you in determining your health and social needs. We can help you obtain health care and community resources, providing you with necessary information and education. We can work with you through any barriers and develop a care plan that helps coordinate and strengthen the communication between you and your care team.    Please feel free to contact me with any questions or concerns regarding care coordination and what we can offer.      We are focused on providing you with the highest-quality healthcare experience possible.    Sincerely,     Lona Lopez, [facility]  Clinic Care Coordinator  [facility]  [phone]

## 2022-12-29 NOTE — LETTER
[facility]  Patient Centered Plan of Care  About Me:        Patient Name:  Fuentes Castillo    YOB: 1985  Age:         37 year old   [place] MRN:    [id number] Telephone Information:  Home Phone [phone]   Mobile [phone]       Address:  [address] [place] [address] [address]  [facility] [address] Email address:  [email or web address]      Emergency Contact(s)    Name Relationship Lgl Grd Work Phone Home Phone Mobile Phone   1. ELVIS SANDERS Significant ot*  none [phone]   2. JESENIA CASTILLO Father  none [phone]           Primary language:  English     needed? No   [place] [facility]:  [phone] op. 1  Other communication barriers:No data recorded  Preferred Method of Communication:     Current living arrangement: Other    Mobility Status/ Medical Equipment: Independent        Health Maintenance  Health Maintenance Reviewed: No data recorded    My Access Plan  Medical Emergency 911   Primary Clinic Line   - [phone]   24 Hour Appointment Line [phone] or  [phone]) (toll-free)   24 Hour Nurse Line [phone] (toll-free)   Preferred Urgent Care Other     Preferred Hospital [facility]  [phone]     Preferred Pharmacy [facility] [address]     Behavioral Health Crisis Line The National Suicide Prevention Lifeline at 1-477.726.1358 or Text/Call 988             My Care Team Members  Patient Care Team       Relationship Specialty Notifications Start End    [facility] (Inactive) PCP - General   10/16/13     [facility]    Phone: [phone] Fax: [phone]         [address] [facility] [address]    Washington Wynn MD Assigned PCP   12/19/21     Phone: [phone] Fax: [phone]         [address] [facility] [address]    Debora Sánchez Community Health Worker  Admissions 12/27/22 12/29/22    Lona Lopez, LICSW  Lead Care Coordinator  Admissions 12/29/22             My Care Plans  Self Management and Treatment Plan  Care Plan  Care Plan: Community Resources     Problem: Lack of permanent housing, need for increased mental health support     Note:     /Goal Statement: Over the next 6 months, Fuentes will continue working with Care Coordination to ensure his needs are met for her overall health and wellbeing.  Date Goal Set: 12/29/22  Barriers: Transportation, finances  Strengths: Articulate, motivated  Date to Achieve By: 6/29/23  Patient expressed understanding of goal: yes  Action steps to achieve this goal:  1. I will continue to follow up with medical team as needed  2. I will consider applying to [facility] for Section 8, I will discuss therapy with the mental health [facility] referral  is placing for me  3. I will outreach to Care Coordination  for further questions or concerns       Goal: Establish adequate home support                        Action Plans on File:                       Advance Care Plans/Directives Type:   No data recorded    My Medical and Care Information  Problem List   Patient Active Problem List   Diagnosis     Other acne     Social phobia     Panic disorder without agoraphobia     Major depressive disorder, single episode, moderate (H)     Generalized anxiety disorder     Depression     Attention deficit hyperactivity disorder, predominantly inattentive type     Displacement of lumbar intervertebral disc without myelopathy      Current Medications and Allergies:  See printed Medication Report.    Care Coordination Start Date: 12/27/2022   Frequency of Care Coordination: monthly     Form Last Updated: 12/29/2022

## 2022-12-29 NOTE — PROGRESS NOTES
Clinic Care Coordination Contact    Clinic Care Coordination Contact  OUTREACH    Referral Information:  Referral Source: Behavioral Health Clinician  SW spoke with pt at length about his short and long term goals.  Pt states he is living with his sister, but it is very short term, as there is not physically room for him in her apartment.  Pt states he wants to avoid the shelter system if possible.  Pt states he does not have a car.  SW indicates to pt that he may have transportation benefits at least to medical appointment.  SW suggests that pt call the number on his insurance card to inquire.  Pt states he has a job interview tomorrow in [place] and he is excited about it, as the pay is good and he feels it is a job he is qualified for.  SW mentions that there is a rare openining to apply for Section 8 in [facility], and pt is highly interested and SW gave him the information to apply.   Pt states he has medication support, but is interested in having a therapist as well.  SW states that SW can place a referral for mental health , and that many of the providers are in the [facility] but often can meet with pt virtually if they are interested.  Pt states he is interested in this, and SW explains that he will likely receive a call from mental health  within a week.    Pt is in agreement to work with [facility] and SW will follow up in one month.  Pt understands that he can reach out to SW sooner if he has questions,           Chief Complaint   Patient presents with     Clinic Care Coordination - Initial        Universal Utilization:    Utilization    Hospital Admissions  0             ED Visits  1             No Show Count (past year)  1                Current as of: 12/28/2022  1:19 PM              Clinical Concerns:  Current Medical Concerns:  Pt states he is medically stable     Current Behavioral Concerns: Depression and anxiety    Education Provided to patient: Community resources for  housing, transportation benefits, mental health .       Health Maintenance Reviewed:    Clinical Pathway: None    Medication Management:  Medication review status: Medications reviewed and no changes reported per patient.             Functional Status:  Dependent ADLs:: Independent  Dependent IADLs:: Independent  Bed or wheelchair confined:: No  Mobility Status: Independent    Living Situation:  Current living arrangement:: Other  Type of residence:: Apartment    Lifestyle & Psychosocial Needs:    Social Determinants of Health     Tobacco Use: High Risk     Smoking Tobacco Use: Every Day     Smokeless Tobacco Use: Never     Passive Exposure: Not on file   Alcohol Use: Heavy Drinker     Frequency of Alcohol Consumption: 2-4 times a month     Average Number of Drinks: 3 or 4     Frequency of Binge Drinking: Less than monthly   Financial Resource Strain: High Risk     Difficulty of Paying Living Expenses: Very hard   Food Insecurity: Food Insecurity Present     Worried About Running Out of Food in the Last Year: Sometimes true     Ran Out of Food in the Last Year: Sometimes true   Transportation Needs: Unmet Transportation Needs     Lack of Transportation (Medical): No     Lack of Transportation (Non-Medical): Yes   Physical Activity: Inactive     Days of Exercise per Week: 0 days     Minutes of Exercise per Session: 0 min   Stress: Stress Concern Present     Feeling of Stress : To some extent   Social Connections: Socially Isolated     Frequency of Communication with Friends and Family: More than three times a week     Frequency of Social Gatherings with Friends and Family: Twice a week     Attends Denominational Services: Never     Active Member of Clubs or Organizations: No     Attends Club or Organization Meetings: Not on file     Marital Status: Never    Intimate Partner Violence: Not on file   Depression: At risk     PHQ-2 Score: 4   Housing Stability: High Risk     Unable to Pay for Housing in the  Last Year: Yes     Number of Places Lived in the Last Year: 3     Unstable Housing in the Last Year: Yes              Baptism or spiritual beliefs that impact treatment:: No  Mental health DX:: Yes  Mental health DX how managed:: Medication  Mental health management concern (GOAL):: Yes  Chemical Dependency Status: Current Concern  Informal Support system:: Family        Resources and Interventions:  Current Resources:      Community Resources: None  Supplies Currently Used at Home: None  Equipment Currently Used at Home: none  Employment Status: employment seeking         Advance Care Plan/Directive  Advanced Care Plans/Directives on file:: No  Advanced Care Plan/Directive Status: Declined Further Information    Referrals Placed: [facility], Mental Health         Care Plan:  Care Plan: Community Resources     Problem: Lack of permanent housing, need for increased mental health support     Note:     /Goal Statement: Over the next 6 months, Fuentes will continue working with Care Coordination to ensure his needs are met for her overall health and wellbeing.  Date Goal Set: 12/29/22  Barriers: Transportation, finances  Strengths: Articulate, motivated  Date to Achieve By: 6/29/23  Patient expressed understanding of goal: yes  Action steps to achieve this goal:  1. I will continue to follow up with medical team as needed  2. I will consider applying to [facility] for Section 8, I will discuss therapy with the mental health  referral [facility] is placing for me  3. I will outreach to Care Coordination  for further questions or concerns       Goal: Establish adequate home support                        Patient/Caregiver understanding: Pt reports understanding and denies any additional questions or concerns at this time. TEOF[facility]O CC engaged in AIDET communication during encounter.        Outreach Frequency: monthly      Plan: TEOF[facility]O to make referral to mental health .   Lona Lopez   [facility]  Clinic Care Coordinator  [facility]  [phone]  [email or web address]

## 2022-12-30 ENCOUNTER — TELEPHONE (OUTPATIENT)
Dept: BEHAVIORAL HEALTH | Facility: CLINIC | Age: 37
End: 2022-12-30

## 2022-12-30 NOTE — TELEPHONE ENCOUNTER
Second attempt to contact pt. Writer left a VM with TC contact info and encouraged a phone call back to schedule initial therapy appointment. Coordinator will loni referral as complete.Tracker completed.    Luz Marina Garcia  12/30/22  824    ----- Message from REESE Smith sent at 12/29/2022  9:48 AM CST -----  Regarding: Therapy referral  Transition Clinic Referral   [place] (Limited)        Please Check Type of Referral Requested:       ___X_THERAPY: The Transition clinic is able to schedule patients without current medical insurance; these patient will be referred to our Social Work Care Coordinator for Medical Insurance              Assistance. We are open for referral for psychotherapy. Patient is referred from:  [facility]      ____MEDICATION:  Referrals for Medication are ONLY accepted from the following areas (select):        Referring Provider Contact Name: Lona Jessica; Phone Number: [phone]    Reason for Transition Clinic Referral: High level of anxiety and depression, no current provider    Next Level of Care Patient Will Be Transitioned To: N/A  Provider(s)N/A  Location N/A  Date/Time N/A    What Would Be Helpful from the Transition Clinic: Therapist     Needs: NO    Does Patient Have Access to Technology: yes    Patient E-mail Address: [email or web address]    Current Patient Phone Number: [phone]    Clinician Gender Preference (if applicable): NO    Patient location preference: [place]

## 2023-01-05 ENCOUNTER — TELEPHONE (OUTPATIENT)
Dept: BEHAVIORAL HEALTH | Facility: CLINIC | Age: 38
End: 2023-01-05

## 2023-01-05 NOTE — TELEPHONE ENCOUNTER
Writer spoke with pt and scheduled initial TC therapy appointment on 01/06/2023 @ 8:30 am. Writer sent intake documents via [employer]. Writer will reply to referral source.Tracker completed.    Luz Marina Garcia  01/05/2023  233      -- Message from REESE Smith sent at 12/29/2022  9:48 AM CST -----  Regarding: Therapy referral  Transition Clinic Referral   [place] ([place])           Please Check Type of Referral Requested:         ___X_THERAPY: The Transition clinic is able to schedule patients without current medical insurance; these patient will be referred to our Social Work Care Coordinator for Medical Insurance              Assistance. We are open for referral for psychotherapy. Patient is referred from:  [facility]        ____MEDICATION:  Referrals for Medication are ONLY accepted from the following areas (select):         Referring Provider Contact Name: Lona Lopez; Phone Number: [phone]     Reason for Transition Clinic Referral: High level of anxiety and depression, no current provider     Next Level of Care Patient Will Be Transitioned To: N/A  Provider(s)N/A  Location N/A  Date/Time N/A     What Would Be Helpful from the Transition Clinic: Therapist      Needs: NO     Does Patient Have Access to Technology: yes     Patient E-mail Address: [email or web address]     Current Patient Phone Number: [phone]     Clinician Gender Preference (if applicable): NO     Patient location preference: [place]

## 2023-01-06 ENCOUNTER — VIRTUAL VISIT (OUTPATIENT)
Dept: BEHAVIORAL HEALTH | Facility: CLINIC | Age: 38
End: 2023-01-06
Payer: COMMERCIAL

## 2023-01-06 DIAGNOSIS — F34.1 PERSISTENT DEPRESSIVE DISORDER: Primary | ICD-10-CM

## 2023-01-06 DIAGNOSIS — F41.1 GENERALIZED ANXIETY DISORDER: ICD-10-CM

## 2023-01-06 DIAGNOSIS — F90.0 ATTENTION DEFICIT HYPERACTIVITY DISORDER, PREDOMINANTLY INATTENTIVE TYPE: ICD-10-CM

## 2023-01-06 ASSESSMENT — COLUMBIA-SUICIDE SEVERITY RATING SCALE - C-SSRS
LETHALITY/MEDICAL DAMAGE CODE MOST LETHAL ACTUAL ATTEMPT: NO PHYSICAL DAMAGE OR VERY MINOR PHYSICAL DAMAGE
LETHALITY/MEDICAL DAMAGE CODE MOST RECENT ACTUAL ATTEMPT: NO PHYSICAL DAMAGE OR VERY MINOR PHYSICAL DAMAGE
TOTAL  NUMBER OF INTERRUPTED ATTEMPTS LIFETIME: YES
MOST RECENT DATE: 66441
5. HAVE YOU STARTED TO WORK OUT OR WORKED OUT THE DETAILS OF HOW TO KILL YOURSELF? DO YOU INTEND TO CARRY OUT THIS PLAN?: NO
4. HAVE YOU HAD THESE THOUGHTS AND HAD SOME INTENTION OF ACTING ON THEM?: NO
TOTAL  NUMBER OF ABORTED OR SELF INTERRUPTED ATTEMPTS LIFETIME: NO
ATTEMPT LIFETIME: YES
6. HAVE YOU EVER DONE ANYTHING, STARTED TO DO ANYTHING, OR PREPARED TO DO ANYTHING TO END YOUR LIFE?: NO
1. IN THE PAST MONTH, HAVE YOU WISHED YOU WERE DEAD OR WISHED YOU COULD GO TO SLEEP AND NOT WAKE UP?: NO
2. HAVE YOU ACTUALLY HAD ANY THOUGHTS OF KILLING YOURSELF?: NO
5. HAVE YOU STARTED TO WORK OUT OR WORKED OUT THE DETAILS OF HOW TO KILL YOURSELF? DO YOU INTEND TO CARRY OUT THIS PLAN?: YES
1. HAVE YOU WISHED YOU WERE DEAD OR WISHED YOU COULD GO TO SLEEP AND NOT WAKE UP?: YES
TOTAL  NUMBER OF INTERRUPTED ATTEMPTS PAST 3 MONTHS: 1
TOTAL  NUMBER OF INTERRUPTED ATTEMPTS PAST 3 MONTHS: YES
MOST LETHAL DATE: 66441
REASONS FOR IDEATION LIFETIME: EQUALLY TO GET ATTENTION, REVENGE, OR A REACTION FROM OTHERS AND TO END/STOP THE PAIN
TOTAL  NUMBER OF INTERRUPTED ATTEMPTS LIFETIME: 1
REASONS FOR IDEATION PAST MONTH: DOES NOT APPLY
TOTAL  NUMBER OF ACTUAL ATTEMPTS LIFETIME: 1
LETHALITY/MEDICAL DAMAGE CODE FIRST ACTUAL ATTEMPT: NO PHYSICAL DAMAGE OR VERY MINOR PHYSICAL DAMAGE
FIRST ATTEMPT DATE: 66441
LETHALITY/MEDICAL DAMAGE CODE FIRST POTENTIAL ATTEMPT: BEHAVIOR NOT LIKELY TO RESULT IN INJURY
2. HAVE YOU ACTUALLY HAD ANY THOUGHTS OF KILLING YOURSELF?: YES
LETHALITY/MEDICAL DAMAGE CODE MOST RECENT POTENTIAL ATTEMPT: BEHAVIOR NOT LIKELY TO RESULT IN INJURY
3. HAVE YOU BEEN THINKING ABOUT HOW YOU MIGHT KILL YOURSELF?: NO
TOTAL  NUMBER OF ACTUAL ATTEMPTS PAST 3 MONTHS: 1
ATTEMPT PAST THREE MONTHS: YES
LETHALITY/MEDICAL DAMAGE CODE MOST LETHAL POTENTIAL ATTEMPT: BEHAVIOR NOT LIKELY TO RESULT IN INJURY
4. HAVE YOU HAD THESE THOUGHTS AND HAD SOME INTENTION OF ACTING ON THEM?: NO

## 2023-01-06 NOTE — PROGRESS NOTES
[facility]   Mental Health & Addiction Services     Progress Note - Initial Visit    Patient  Name:  Fuentes Logansgaard Date: 2023         Service Type: Individual     Visit Start Time: 844  Visit End Time: 938    Visit #:    1    Attendees: Client    Service Modality:  Video Visit:      Provider verified identity through the following two step process.  Patient provided:  Patient  and Patient address    Telemedicine Visit: The patient's condition can be safely assessed and treated via synchronous audio and visual telemedicine encounter.      Reason for Telemedicine Visit: Patient convenience (e.g. access to timely appointments / distance to available provider)    Originating Site (Patient Location): Patient's home    Distant Site (Provider Location): [facility]    Consent:  The patient/guardian has verbally consented to: the potential risks and benefits of telemedicine (video visit) versus in person care; bill my insurance or make self-payment for services provided; and responsibility for payment of non-covered services.     Patient would like the video invitation sent by:  My Chart    Mode of Communication:  Video Conference via [facility]    Distant Location (Provider):  Off-site    As the provider I attest to compliance with applicable laws and regulations related to telemedicine.       DATA:   Interactive Complexity: No   Crisis: No     Presenting Concerns/  Current Stressors:   Pt is 37-year-old male referred to [facility] by PCP for support related to anxiety and depressed mood. Clinician and pt reviewed informed consent, limits to confidentiality, the benefits and limitations of therapy, and the role and limitations of a [facility] therapist. Per charting and pt reporting, pt was in the ED on 2022 for SI with excessive alcohol and medication ingestion. Pt reported he knew the amounts he ingested weren't likely to kill him but  "that he really wanted to just feel different and get some sleep, having not slept well for weeks. At the time that he took the medications he had been \"drinking all day\" and reported this contributed to his impulsiveness in taking the medications. Pt reported he isn't sure what happened but his ex-partner called EMS and he was taken to the hospital. Pt denies current SI, plan or intent to act and denies the same since his ED visit on 11.28.2022.    During the interview pt described his sxs (primarily depression- and anxiety-spectrum) and their affect on functioning reporting he considers himself to be a \"classic underachiever,\" meaning he could have done more in life if he had tried harder. He reported he feels he is in a constant state of anxiety and/or mild depression most of the time. Pt reported he was diagnosed with ADD apx 10 years ago and has been taking medications for symptom relief that have been extremely helpful. Pt stated \"I wish I had known this when I was in school.\"     Pt reported he believes he would benefit from regular therapy and identified this session as helpful. At the end of the session pt was offered assistance in finding a long-term therapist but due to time restraints this will be addressed at next session. Pt elected to continue meeting with this therapist until next LoC is obtained.      ASSESSMENT:  Mental Status Assessment:  Appearance:   Appropriate   Eye Contact:   Fair   Psychomotor Behavior: Normal  Restless   Attitude:   Cooperative  Interested Friendly  Orientation:   All  Speech   Rate / Production: Normal/ Responsive   Volume:  Normal   Mood:    Normal  Affect:    Appropriate   Thought Content:  Clear   Thought Form:  Coherent  Goal Directed  Logical   Insight:    Good       Safety Issues and Plan for Safety and Risk Management:     Jennings Suicide Severity Rating Scale (Lifetime/Recent)  Jennings Suicide Severity Rating (Lifetime/Recent) 10/16/2013 1/6/2023 1/6/2023   Do you " have guns available to you? No - -   1. Wish to be Dead (Lifetime) - - 1   Wish to be Dead Description (Lifetime) - - felt overwhlemed and stuck   1. Wish to be Dead (Past 1 Month) - 0 0   2. Non-Specific Active Suicidal Thoughts (Lifetime) - - 1   Non-Specific Active Suicidal Thought Description (Lifetime) - - just not wanting to be here   2. Non-Specific Active Suicidal Thoughts (Past 1 Month) - 0 0   3. Active Suicidal Ideation with any Methods (Not Plan) Without Intent to Act (Lifetime) - - 0   Active Suicidal Ideation with any Methods (Not Plan) Description (Lifetime) - - n/a   3. Active Suicidal Ideation with any Methods (Not Plan) Without Intent to Act (Past 1 Month) - - 0   4. Active Suicidal Ideation with Some Intent to Act, Without Specific Plan (Lifetime) - - 0   Active Suicidal Ideation with Some Intent to Act, Without Specific Plan Description (Lifetime) - - n/a   4. Active Suicidal Ideation with Some Intent to Act, Without Specific Plan (Past 1 Month) - - 0   5. Active Suicidal Ideation with Specific Plan and Intent (Lifetime) - - 1   Active Suicidal Ideation with Specific Plan and Intent Description (Lifetime) - - mixed alcohol and medications   5. Active Suicidal Ideation with Specific Plan and Intent (Past 1 Month) - - 0   Most Severe Ideation Rating (Lifetime) - - 5   Most Severe Ideation Rating (Past 1 Month) - - 1   Description of Most Severe Ideation (Past 1 Month) - - n/a   Frequency (Lifetime) - - 1   Frequency (Past 1 Month) - - 1   Duration (Lifetime) - - 1   Duration (Past 1 Month) - - 1   Controllability (Lifetime) - - 1   Controllability (Past 1 Month) - - 1   Deterrents (Lifetime) - - 1   Deterrents (Past 1 Month) - - 0   Reasons for Ideation (Lifetime) - - 3   Reasons for Ideation (Past 1 Month) - - 0   Actual Attempt (Lifetime) - - 1   Total Number of Actual Attempts (Lifetime) - - 1   Actual Attempt Description (Lifetime) - - mixed alcohol and medication   Actual Attempt (Past 3  Months) - - 1   Total Number of Actual Attempts (Past 3 Months) - - 1   Actual Attempt Description (Past 3 Months) - - mixed alcohol and medication   Has subject engaged in non-suicidal self-injurious behavior? (Lifetime) - - 0   Interrupted Attempts (Lifetime) - - 1   Total Number of Interrupted Attempts (Lifetime) - - 1   Interrupted Attempt Description (Lifetime) - - ex-partner called EMS   Interrupted Attempts (Past 3 Months) - - 1   Total Number of Interrupted Attempts (Past 3 Months) - - 1   Interrupted Attempt Description (Past 3 Months) - - ex-partner called EMS   Aborted or Self-Interrupted Attempt (Lifetime) - - 0   Preparatory Acts or Behavior (Lifetime) - - 0   Most Recent Attempt Date - - 08570   Actual Lethality/Medical Damage Code (Most Recent Attempt) - - 0   Potential Lethality Code (Most Recent Attempt) - - 0   Most Lethal Attempt Date - - 16202   Actual Lethality/Medical Damage Code (Most Lethal Attempt) - - 0   Potential Lethality Code (Most Lethal Attempt) - - 0   Initial/First Attempt Date - - 85241   Actual Lethality/Medical Damage Code (Initial/First Attempt) - - 0   Potential Lethality Code (Initial/First Attempt) - - 0   Calculated C-SSRS Risk Score (Lifetime/Recent) - High Risk High Risk     Patient denies current fears or concerns for personal safety.  Patient reports the following current or recent suicidal ideation or behaviors: pt was in the ED on 11.28.2023 for active SI without intent to die.  Patient denies current or recent homicidal ideation or behaviors.  Patient denies current or recent self injurious behavior or ideation.  Patient denies other safety concerns.  Recommended that patient call 911 or go to the local ED should there be a change in any of these risk factors.  Patient reports there are no firearms in the house.     Diagnostic Criteria:  Generalized Anxiety Disorder  A. Excessive anxiety and worry about a number of events or activities (such as work or school  performance).   B. The person finds it difficult to control the worry.  C. Select 3 or more symptoms (required for diagnosis). Only one item is required in children.   - Restlessness or feeling keyed up or on edge.    - Difficulty concentrating or mind going blank.    - Irritability.    - Muscle tension.    - Sleep disturbance (difficulty falling or staying asleep, or restless unsatisfying sleep).   D. The focus of the anxiety and worry is not confined to features of an Axis I disorder.  E. The anxiety, worry, or physical symptoms cause clinically significant distress or impairment in social, occupational, or other important areas of functioning.   F. The disturbance is not due to the direct physiological effects of a substance (e.g., a drug of abuse, a medication) or a general medical condition (e.g., hyperthyroidism) and does not occur exclusively during a Mood Disorder, a Psychotic Disorder, or a Pervasive Developmental Disorder.    - The aformentioned symptoms began 30 year(s) ago and occurs 7 days per week and is experienced as moderate.  Persistent Depressive Disorder  A. Depressed mood for most of the day, for more days than not, as indicated either by subjective account or observation by others, for at least 2 years. Note: In children and adolescents, mood can be irritable and duration must be at least 1 year.   B. Presence, while depressed, of two (or more) of the following:        - poor appetite or overeating        - insomnia or hypersomnia       - low self-esteem        - poor concentration or difficulty making decisions        - feelings of hopelessness   C. During the 2-year period (1 year for children or adolescents) of the disturbance, the person has never been without the symptoms in Criteria A and B for more than 2 months at a time.  D. Criteria for a major depressive disorder may be continously present for 2 years  E. There has never been a Manic Episode, a Mixed Episode, or a Hypomanic Episode,  and criteria have never been met for Cyclothymic Disorder.   F. The disturbance is not better explained by a persistent schizoaffective disorder, schizophrenia, delusional disorder, or other specified or unspecified schizophrenia spectrum and other psychotic disorder  G. The symptoms are not attributable to the physiological effects of a substance (e.g., a drug of abuse, a medication) or another medical condition (e.g., hypothyroidism).   H. The symptoms cause clinically significant distress or impairment in social, occupational, or other important areas of functioning.        - Early Onset: onset is before age 21 years       DSM5 Diagnoses: (Sustained by DSM5 Criteria Listed Above)  Diagnoses: 300.4 (F34.1) Persistent Depressive Disorder, Early onset and Moderate  300.02 (F41.1) Generalized Anxiety Disorder  Psychosocial & Contextual Factors: 37-year-old male from [place], [place], unemployed, single with 11-year-old son. MH hx of ADD, depression, anxiety all responsive to medications; no therapy hx.   WHODAS 2.0 (12 item):   WHODAS 2.0 Total Score 1/6/2023   Total Score 17   Total Score MyChart 17     Intervention:   Psychodynamic- Patient processed internal experiences   Collateral Reports Completed:  Not Applicable      PLAN: (Homework, other):  1. Provider will continue Diagnostic Assessment.  Patient was given the following to do until next session:  n/a    2. Provider recommended the following referrals: Provider will offer pt referrals for long-term therapy.      3.  Suicide Risk and Safety Concerns were assessed for Fuentes Zamora.    Patient meets the following risk assessment and triage: Patient has no change in safety concerns. Committed to safety and agreed to follow previously developed safety plan.      URMILA Munson  January 6, 2023

## 2023-01-12 ENCOUNTER — VIRTUAL VISIT (OUTPATIENT)
Dept: BEHAVIORAL HEALTH | Facility: CLINIC | Age: 38
End: 2023-01-12
Payer: COMMERCIAL

## 2023-01-12 DIAGNOSIS — F34.1 PERSISTENT DEPRESSIVE DISORDER: Primary | ICD-10-CM

## 2023-01-12 DIAGNOSIS — F41.1 GENERALIZED ANXIETY DISORDER: ICD-10-CM

## 2023-01-12 DIAGNOSIS — F90.0 ATTENTION DEFICIT HYPERACTIVITY DISORDER, PREDOMINANTLY INATTENTIVE TYPE: ICD-10-CM

## 2023-01-12 ASSESSMENT — COLUMBIA-SUICIDE SEVERITY RATING SCALE - C-SSRS
2. HAVE YOU ACTUALLY HAD ANY THOUGHTS OF KILLING YOURSELF?: NO
1. SINCE LAST CONTACT, HAVE YOU WISHED YOU WERE DEAD OR WISHED YOU COULD GO TO SLEEP AND NOT WAKE UP?: NO

## 2023-01-12 NOTE — PROGRESS NOTES
"    [facility]      PATIENT'S NAME: Fuentes Zamora  PREFERRED NAME: Fuentes  PRONOUNS:  he/him     MRN: [id number]  : 1985  ADDRESS: [address] [place] [address]  [facility] [address]  ACCT. NUMBER:  [id number]  DATE OF SERVICE: 23  START TIME: 830  END TIME: 930  PREFERRED PHONE: [phone]  May we leave a program related message: Yes  SERVICE MODALITY:  Video Visit:      Provider verified identity through the following two step process.  Patient provided:  Patient is known previously to provider    Telemedicine Visit: The patient's condition can be safely assessed and treated via synchronous audio and visual telemedicine encounter.      Reason for Telemedicine Visit: Patient convenience (e.g. access to timely appointments / distance to available provider)    Originating Site (Patient Location): Patient's other sister's home    Distant Site (Provider Location): [facility]    Consent:  The patient/guardian has verbally consented to: the potential risks and benefits of telemedicine (video visit) versus in person care; bill my insurance or make self-payment for services provided; and responsibility for payment of non-covered services.     Patient would like the video invitation sent by:  My Chart    Mode of Communication:  Video Conference via [employer]    Distant Location (Provider):  Off-site    As the provider I attest to compliance with applicable laws and regulations related to telemedicine.    UNIVERSAL ADULT Mental Health DIAGNOSTIC ASSESSMENT    Identifying Information:  Patient is a 37 year old, ;  individual.  Patient was referred for an assessment by self.  Patient attended the session alone.    Chief Complaint:   The reason for seeking services at this time is: \"Depression/anxiety\".  The problem(s) began 89.    Patient has attempted to resolve these " concerns in the past through medication.    Social/Family History:  Patient reported they grew up in [facility].  They were raised by biological parents.  Parents were always together.  Patient reported that their childhood was normal. Pt reported he experienced a lot of anxiety growing up beginning around age 8. Pt reported his father was hard on him, hard to please, and had issues with substance use.  Patient described their current relationships with family of origin as good with my mom and sister. Pt reported he avoids his father.     The patient describes their cultural background as ; . Cultural influences and impact on patient's life structure, values, norms, and healthcare: Grew up Denominational. Contextual influences on patient's health include: Contextual Factors: Family Factors mother struggled with depression and Health- Seeking Factors encouraged to seek tx as needed. These factors will be addressed in the Preliminary Treatment plan. Patient identified their preferred language to be English. Patient reported they does not need the assistance of an  or other support involved in therapy.     Patient reported had no significant delays in developmental tasks.   Patient's highest education level was some college.  Patient identified the following learning problems: attention and concentration. Modifications will not be used to assist communication in therapy. Patient reports they are  able to understand written materials.    Patient reported the following relationship history dated a lot; off and on with son's mother for 11 years.  Patient's current relationship status is single for 2 years.  Patient identified their sexual orientation as heterosexual. Patient reported having 1 child(ellen), a son who is 11. Patient identified mother; siblings as part of their support system.  Patient identified the quality of these relationships as fair to good.      Patient's current  living/housing situation involves other. The immediate members of family and household include Fuentes Zamora, 37,Me and they report that housing is not stable.    Patient is currently employed fulltime.  Patient reports their finances are obtained through parents. Patient does identify finances as a current stressor.      Patient reported that they have been involved with the legal system.  Felony 8 years ago for domestic. Patient does not report being under probation/ parole/ jurisdiction. They are not under any current court jurisdiction. .    Patient's Strengths and Limitations:  Patient identified the following strengths or resources that will help them succeed in treatment: family support, insight, intelligence, sense of humor and strong social skills. Things that may interfere with the patient's success in treatment include: housing instability.     Assessments:  The following assessments were completed by patient for this visit:  [place] Suicide Severity Rating Scale Since Last Contact: 1.12.2023  Suicidal Ideation (Since Last Contact)  1. Wish to be Dead (Since Last Contact): No  2. Non-Specific Active Suicidal Thoughts (Since Last Contact): No  C-SSRS Risk (Since Last Contact)  Calculated C-SSRS Risk Score (Since Last Contact): No Risk Indicated    PHQ9:   PHQ-9 SCORE 5/8/2012 5/15/2012 12/14/2021 1/11/2022 5/20/2022 12/27/2022 1/6/2023   PHQ-9 Total Score 8 8 - - - - -   PHQ-9 Total Score MyChart - - - - 8 (Mild depression) 6 (Mild depression) 4 (Minimal depression)   PHQ-9 Total Score - - 18 6 8 6 4     GAD2: No flowsheet data found.    Personal and Family Medical History:  Patient does report a family history of mental health concerns.  Patient reports family history includes Anxiety Disorder in his mother and sister; Bipolar Disorder in his sister; Cancer in his mother; Depression in his mother and sister; Substance Abuse in his father and sister..     Patient does report Mental Health Diagnosis and/or  Treatment.  Patient Patient reported the following previous diagnoses which include(s): ADHD, an Anxiety Disorder and Depression. Patient reported symptoms began middle school.   Patient has not received mental health services in the past: inpatient mental health services at [facility] and primary care provider at [facility]. Psychiatric Hospitalizations: [facility]. Patient denies a history of civil commitment.  Patient is not receiving other mental health services. These include none.       Patient has not had a physical exam to rule out medical causes for current symptoms. Date of last physical exam was greater than a year ago and client was encouraged to schedule an exam with PCP. The patient has a [facility] Primary Care Provider, who is named Clinic[facility] (Inactive).  Patient reports no current medical concerns.  Patient denies any issues with pain.  There are not significant appetite / nutritional concerns / weight changes.  Patient does not report a history of head injury / trauma / cognitive impairment.     Patient reports current meds as:   No outpatient medications have been marked as taking for the 1/12/23 encounter (Appointment) with Jenna Cerrato LADC.       Medication Adherence:  Patient reports taking.  taking prescribed medications as prescribed.    Patient Allergies:    Allergies   Allergen Reactions     No Known Allergies        Medical History:    Past Medical History:   Diagnosis Date     Anxiety      Depressive disorder     anxiety     Major depressive disorder with single episode     hospitalized for SI     Other acne          Current Mental Status Exam:   Appearance:  Appropriate    Eye Contact:  Good   Psychomotor:  Normal       Gait / station:  no problem  Attitude / Demeanor: Cooperative  Interested Friendly  Speech      Rate / Production: Normal/ Responsive      Volume:  Normal  volume       Language:  intact  Mood:   Normal  Affect:   Appropriate    Thought Content: Clear   Thought Process: Coherent  Goal Directed  Logical       Associations: No loosening of associations  Insight:   Good   Judgment:  Intact   Orientation:  All  Attention/concentration: Good      Substance Use:  Patient did not report a family history of substance use concerns; see medical history section for details.  Patient has not received chemical dependency treatment in the past.  Patient has not ever been to detox.      Patient is not currently receiving any chemical dependency treatment.           Substance History of use Age of first use Date of last use     Pattern and duration of use (include amounts and frequency)   Alcohol currently use   19 01/05/23 REPORTS SUBSTANCE USE: reports using substance 1 times per week and has 3 drinks at a time.   Patient reports heaviest use was mid-twenties to mid-thirties.   Cannabis   used in the past 16 04/06/22 REPORTS SUBSTANCE USE: reports using substance 1 times per year and has 2 hits at a time.   Patient reports heaviest use was age [age].     Amphetamines   currently use   01/05/23 As prescribed   Cocaine/crack    never used       REPORTS SUBSTANCE USE: N/A   Hallucinogens never used         REPORTS SUBSTANCE USE: N/A   Inhalants never used         REPORTS SUBSTANCE USE: N/A   Heroin never used         REPORTS SUBSTANCE USE: N/A   Other Opiates used in the past 21 01/06/18 REPORTS SUBSTANCE USE: 60 mg/day.  Patient reports heaviest use was age [age].   Benzodiazepine   never used     REPORTS SUBSTANCE USE: N/A   Barbiturates never used     REPORTS SUBSTANCE USE: N/A   Over the counter meds never used     REPORTS SUBSTANCE USE: N/A   Caffeine currently use 7   REPORTS SUBSTANCE USE: reports using substance 1 times per day and has 1 drink at a time.   Patient reports heaviest use was high school.   Nicotine  currently use 16 01/05/23 REPORTS SUBSTANCE USE: reports using substance 7  times per day and has 1 puff at a time.   Patient reports heaviest use is current use.   Other substances not listed above:  Identify:  never used     REPORTS SUBSTANCE USE: N/A     Patient reported the following problems as a result of their substance use: relationship problems.    Substance Use: No symptoms    Based on the negative CAGE score and clinical interview there  are not indications of drug or alcohol abuse.      Significant Losses / Trauma / Abuse / Neglect Issues:   Patient did not serve in the .  There are indications or report of significant loss, trauma, abuse or neglect issues related to: are indications or report of significant loss, trauma, abuse or neglect issues related to, divorce / relational changes with child's mother, homelessness current and client's experience of neglect by father.  Concerns for possible neglect are not present.     Safety Assessment:   Patient denies current homicidal ideation and behaviors.  Patient denies current self-injurious ideation and behaviors.    Patient reported becoming mean  associated with substance use in the past.  Patient reported substance use associated with mental health symptoms in the past.  Patient reports the following current concerns for their personal safety: None.  Patient reports there are not firearms in the house.    History of Safety Concerns:  Patient denied a history of homicidal ideation.     Patient denied a history of personal safety concerns.    Patient denied a history of assaultive behaviors.    Patient denied a history of sexual assault behaviors.     Patient gets mean associated with substance use in the past.  Patient reported a history of substance use associated with mental health symptoms in the past.  Patient reports the following protective factors: regular sleep    Risk Plan:  See Recommendations for Safety and Risk Management Plan    Review of Symptoms per patient report:   Depression: No symptoms, Lack of  interest, Excessive or inappropriate guilt, Change in energy level, Difficulties concentrating, Change in appetite, Suicidal ideation, Feelings of hopelessness, Feelings of helplessness, Low self-worth, Ruminations, Feeling sad, down, or depressed and Anger outbursts  Cristal:  Restlessness  Psychosis: No Symptoms  Anxiety: Excessive worry, Nervousness, Separation anxiety, Social anxiety, Psychomotor agitation, Ruminations, Poor concentration and Anger outbursts  Panic:  No symptoms  Post Traumatic Stress Disorder:  Experienced traumatic event emotional abuse and neglect by father, Hypervigilance and Increased arousal   Eating Disorder: No Symptoms  ADD / ADHD:  Inattentive, Poor task completion, Poor organizational skills, Distractibility, Interrupts, Intrudes, Impulsive and Restlessness/fidgety  Conduct Disorder: No symptoms and Lies  Autism Spectrum Disorder: No symptoms  Obsessive Compulsive Disorder: No Symptoms    Patient reports the following compulsive behaviors and treatment history: Hair Pulling - has not had treatment..      Diagnostic Criteria:   Generalized Anxiety Disorder  A. Excessive anxiety and worry about a number of events or activities (such as work or school performance).   B. The person finds it difficult to control the worry.  C. Select 3 or more symptoms (required for diagnosis). Only one item is required in children.   - Restlessness or feeling keyed up or on edge.    - Being easily fatigued.    - Difficulty concentrating or mind going blank.    - Irritability.   D. The focus of the anxiety and worry is not confined to features of an Axis I disorder.  E. The anxiety, worry, or physical symptoms cause clinically significant distress or impairment in social, occupational, or other important areas of functioning.   F. The disturbance is not due to the direct physiological effects of a substance (e.g., a drug of abuse, a medication) or a general medical condition (e.g., hyperthyroidism) and does  not occur exclusively during a Mood Disorder, a Psychotic Disorder, or a Pervasive Developmental Disorder.    - The aformentioned symptoms began 7 day(s) ago and occurs 7 days per week and is experienced as varied.     Persistent Depressive Disorder  A. Depressed mood for most of the day, for more days than not, as indicated either by subjective account or observation by others, for at least 2 years. Note: In children and adolescents, mood can be irritable and duration must be at least 1 year.   B. Presence, while depressed, of two (or more) of the following:        - low energy or fatigue        - low self-esteem        - poor concentration or difficulty making decisions        - feelings of hopelessness   C. During the 2-year period (1 year for children or adolescents) of the disturbance, the person has never been without the symptoms in Criteria A and B for more than 2 months at a time.  D. Criteria for a major depressive disorder may be continously present for 2 years  F. The disturbance is not better explained by a persistent schizoaffective disorder, schizophrenia, delusional disorder, or other specified or unspecified schizophrenia spectrum and other psychotic disorder  G. The symptoms are not attributable to the physiological effects of a substance (e.g., a drug of abuse, a medication) or another medical condition (e.g., hypothyroidism).   H. The symptoms cause clinically significant distress or impairment in social, occupational, or other important areas of functioning.        - Early Onset: onset is before age 21 years      Functional Status:  Patient reports the following functional impairments:  childcare / parenting, relationship(s) and work / vocational responsibilities.     Nonprogrammatic care:  Patient is requesting basic services to address current mental health concerns.    Clinical Summary:  1. Reason for assessment: identify problem and appropriate interventions.  2. Psychosocial, Cultural and  Contextual Factors: 37-year-old male from [place], [place], unemployed, single with 11-year-old son. MH hx of ADD, depression, anxiety all responsive to medications; no therapy hx.  3. Principal DSM5 Diagnoses  (Sustained by DSM5 Criteria Listed Above):   300.4 (F34.1) Persistent Depressive Disorder, Early onset and Moderate  300.02 (F41.1) Generalized Anxiety Disorder.  4. Other Diagnoses that is relevant to services:   Attention-Deficit/Hyperactivity Disorder  314.00 (F90.0) Predominantly inattentive presentation by history  5. Provisional Diagnosis: None  6. Prognosis: Expect Improvement.  7. Likely consequences of symptoms if not treated: reduction in function.  8. Client strengths include:  caring, educated, empathetic, insightful, intelligent, open to learning, open to suggestions / feedback, responsible parent, support of family, friends and providers, wants to learn, willing to ask questions and willing to relate to others .     Recommendations:     1. Plan for Safety and Risk Management:   Safety and Risk: Recommended that patient call 911 or go to the local ED should there be a change in any of these risk factors..          Report to child / adult protection services was NA.     2. Patient's identified no cultural considerations.     3. Initial Treatment will focus on:    Depressed Mood - decrease frequency of feeling down and hopeless  Anxiety - increase awareness and ability to cope with anxiety sxs.     4. Resources/Service Plan:    services are not indicated.   Modifications to assist communication are not indicated.   Additional disability accommodations are not indicated.      5. Collaboration:   Collaboration / coordination of treatment will be initiated with the following  support professionals: None.      6.  Referrals:   The following referral(s) will be initiated: long-term individual therapy. Next Scheduled Appointment: 1.19.2023 with [facility] clinician.     A Release of Information has  been obtained for the following: None.     Emergency Contact was not obtained.      Clinical Substantiation/medical necessity for the above recommendations:  Pt has never engaged in individual therapy, is open to trying it; least-restrictive OP tx options should be pursued first.    7. JIM:    JIM:  Discussed the general effects of drugs and alcohol on health and well-being. Provider gave patient printed information about the  effects of chemical use on their health and well being. Recommendations:  None.     8. Records:   These were reviewed at time of assessment.   Information in this assessment was obtained from the medical record and  provided by patient who is a good historian.    Patient will have open access to their mental health medical record.    9.   Interactive Complexity: No      Provider Name/ Credentials:  Jenna Cerrato MA, [facility]  January 12, 2023

## 2023-01-19 ENCOUNTER — VIRTUAL VISIT (OUTPATIENT)
Dept: BEHAVIORAL HEALTH | Facility: CLINIC | Age: 38
End: 2023-01-19
Payer: COMMERCIAL

## 2023-01-19 DIAGNOSIS — F41.1 GENERALIZED ANXIETY DISORDER: ICD-10-CM

## 2023-01-19 DIAGNOSIS — F90.0 ATTENTION DEFICIT HYPERACTIVITY DISORDER, PREDOMINANTLY INATTENTIVE TYPE: ICD-10-CM

## 2023-01-19 DIAGNOSIS — F34.1 PERSISTENT DEPRESSIVE DISORDER: Primary | ICD-10-CM

## 2023-01-19 ASSESSMENT — COLUMBIA-SUICIDE SEVERITY RATING SCALE - C-SSRS
1. SINCE LAST CONTACT, HAVE YOU WISHED YOU WERE DEAD OR WISHED YOU COULD GO TO SLEEP AND NOT WAKE UP?: NO
2. HAVE YOU ACTUALLY HAD ANY THOUGHTS OF KILLING YOURSELF?: NO

## 2023-01-19 NOTE — PROGRESS NOTES
[facility]                                    Progress Note    Patient Name: Fuentes ANTHONY Egsgaard  Date: 1.19.2023         Service Type: Individual      Session Start Time: 1535  Session End Time: 1416     Session Length: 41 minutes    Session #: 03    Attendees: Client    Service Modality:  Video Visit:      Provider verified identity through the following two step process.  Patient provided:  Patient is known previously to provider    Telemedicine Visit: The patient's condition can be safely assessed and treated via synchronous audio and visual telemedicine encounter.      Reason for Telemedicine Visit: Patient convenience (e.g. access to timely appointments / distance to available provider)    Originating Site (Patient Location): Patient's home    Distant Site (Provider Location): [facility] [place]    Consent:  The patient/guardian has verbally consented to: the potential risks and benefits of telemedicine (video visit) versus in person care; bill my insurance or make self-payment for services provided; and responsibility for payment of non-covered services.     Patient would like the video invitation sent by:  My Chart    Mode of Communication:  Video Conference via Amwell    Distant Location (Provider):  Off-site    As the provider I attest to compliance with applicable laws and regulations related to telemedicine.    DATA  Interactive Complexity: No  Crisis: No        Progress Since Last Session (Related to Symptoms / Goals / Homework):   Symptoms: Improving pt continues to be open to feedback    Homework: Completed in session      Episode of Care Goals: Achieved / completed to satisfaction - PRECONTEMPLATION (Not seeing need for change); Intervened by educating the patient about the effects of current behavior on health.  Evoked information about reasons to continue behavior, express concern / recommendations, and explored any change  "talk     Current / Ongoing Stressors and Concerns:   This session pt and writer assessed pt goals as it related to his mental health. Pt was unsure of what unhelpful thoughts/behaviors he wants to work on and agreed to initiate a conversation with his child's mother, Barbi, to get her insight. Pt also agreed to spend some time reflecting on this from his perspective as he reported he's \"never really thought about it before.\" It appears some of patient's frustrations with himself are related to behaviors common to ADD dx and this writer recommended he read the Scattered Minds by Vinh Harrington, which pt agreed to as he reported he loves to read. Pt states he doesn't know much about ADHD; this writer will continue to support pt education around his ADD dx. Tx plan will be completed next session after pt has had some time to reflect more in depth re: unhelpful behaviors and goals.     Treatment Objective(s) Addressed in This Session:   increase knowledge of ADD dx   Work to identify problem behaviors more specifically     Intervention:   Psychodynamic: assisted in identifying patterns of internal experiences  Solution Focused: assisted in narrowing down goals and objectives    Assessments completed prior to visit:  [place] Suicide Severity Rating Scale Since Last Contact: 1.12.2023  Suicidal Ideation (Since Last Contact)  1. Wish to be Dead (Since Last Contact): No  2. Non-Specific Active Suicidal Thoughts (Since Last Contact): No     C-SSRS Risk (Since Last Contact)  Calculated C-SSRS Risk Score (Since Last Contact): No Risk Indicated    Validity of evaluation is not impacted by presenting factors during interview.   Comments regarding subjective versus objective responses to [place] tool: N/A  Environmental or Psychosocial Events: work or task failure, challenging interpersonal relationships, helplessness/hopelessness, impulsivity/recklessness and unstable housing, homelessness  Chronic Risk Factors: parental mental " health issue and other: lifetime of undiagnosed/untreated ADD   Warning Signs: increasing substance use or abuse, withdrawing from friends, family, and society, anxiety, agitation, unable to sleep, sleeping all the time, dramatic changes in mood, no reason for living, no sense of purpose in life and recent losses (physical, financial, personal)  Protective Factors: strong bond to family unit, community support, or employment, responsibilities and duties to others, including pets and children, good treatment engagement, able to access care without barriers, supportive ongoing medical and mental health care relationships, help seeking and constructive use of leisure time, enjoyable activities, resilience  Interpretation of Risk Scoring, Risk Mitigation Interventions and Safety Plan:  N/A    ASSESSMENT: Current Emotional / Mental Status (status of significant symptoms):   Risk status (Self / Other harm or suicidal ideation)   Patient denies current fears or concerns for personal safety.   Patient denies current or recent suicidal ideation or behaviors.   Patient denies current or recent homicidal ideation or behaviors.   Patient denies current or recent self injurious behavior or ideation.   Patient denies other safety concerns.   Patient reports there has been no change in risk factors since their last session.     Patient reports there has been no change in protective factors since their last session.     Recommended that patient call 911 or go to the local ED should there be a change in any of these risk factors.     Appearance:   Appropriate    Eye Contact:   Good    Psychomotor Behavior: Normal    Attitude:   Cooperative  Interested Friendly   Orientation:   All   Speech    Rate / Production: Talkative Normal     Volume:  Normal    Mood:    Normal   Affect:    Appropriate    Thought Content:  Clear    Thought Form:  Coherent  Logical    Insight:    Good      Medication Review:   No changes to current psychiatric  medication(s)     Medication Compliance:   Yes     Changes in Health Issues:   None reported     Chemical Use Review:   Substance Use: Chemical use reviewed, no active concerns identified      Tobacco Use: No change in amount of tobacco use since last session.  not discussed    Diagnosis:  1. Persistent depressive disorder    2. Generalized anxiety disorder    3. Attention deficit hyperactivity disorder, predominantly inattentive type        Collateral Reports Completed:   Not Applicable    PLAN: (Patient Tasks / Therapist Tasks / Other)  -Pt to read recommended book and talk with Barbi about what she experiences of pt's behaviors  -Tx plan next session                                                       Jenna Cerrato MS, [facility]  January 19, 2023

## 2023-01-26 ENCOUNTER — MYC REFILL (OUTPATIENT)
Dept: FAMILY MEDICINE | Facility: CLINIC | Age: 38
End: 2023-01-26
Payer: COMMERCIAL

## 2023-01-26 DIAGNOSIS — F41.1 GENERALIZED ANXIETY DISORDER: ICD-10-CM

## 2023-01-26 DIAGNOSIS — F90.0 ATTENTION DEFICIT HYPERACTIVITY DISORDER, PREDOMINANTLY INATTENTIVE TYPE: ICD-10-CM

## 2023-01-26 DIAGNOSIS — F32.1 MAJOR DEPRESSIVE DISORDER, SINGLE EPISODE, MODERATE (H): ICD-10-CM

## 2023-01-26 RX ORDER — DEXTROAMPHETAMINE SACCHARATE, AMPHETAMINE ASPARTATE MONOHYDRATE, DEXTROAMPHETAMINE SULFATE AND AMPHETAMINE SULFATE 5; 5; 5; 5 MG/1; MG/1; MG/1; MG/1
20 CAPSULE, EXTENDED RELEASE ORAL DAILY
Qty: 30 CAPSULE | Refills: 0 | Status: SHIPPED | OUTPATIENT
Start: 2023-01-26 | End: 2023-02-24

## 2023-01-26 RX ORDER — QUETIAPINE FUMARATE 50 MG/1
50 TABLET, FILM COATED ORAL AT BEDTIME
Qty: 90 TABLET | Refills: 0 | Status: SHIPPED | OUTPATIENT
Start: 2023-01-26 | End: 2023-02-25

## 2023-01-26 NOTE — TELEPHONE ENCOUNTER
"Requested Prescriptions   Pending Prescriptions Disp Refills     QUEtiapine (SEROQUEL) 50 MG tablet 90 tablet 0     Sig: Take 1 tablet (50 mg) by mouth At Bedtime       Antipsychotic Medications Failed - 1/26/2023  4:13 AM        Failed - Lipid panel on file within the past 12 months     No lab results found.            Passed - Blood pressure under 140/90 in past 12 months     BP Readings from Last 3 Encounters:   12/27/22 132/82   11/07/22 (!) 129/90   01/15/17 136/73                 Passed - Patient is 12 years of age or older        Passed - CBC on file in past 12 months     Recent Labs   Lab Test 11/07/22 2051   WBC 9.5   RBC 4.71   HGB 14.1   HCT 43.9                    Passed - Heart Rate on file within past 12 months     Pulse Readings from Last 3 Encounters:   12/27/22 106   11/07/22 110   01/15/17 102               Passed - A1c or Glucose on file in past 12 months     Recent Labs   Lab Test 11/07/22 2051   *       Please review patients last 3 weights. If a weight gain of >10 lbs exists, you may refill the prescription once after instructing the patient to schedule an appointment within the next 30 days.    Wt Readings from Last 3 Encounters:   12/27/22 80.3 kg (177 lb)   11/07/22 79.4 kg (175 lb)   01/07/17 70.3 kg (155 lb)             Passed - Medication is active on med list        Passed - Recent (6 mo) or future (30 days) visit within the authorizing provider's specialty     Patient had office visit in the last 6 months or has a visit in the next 30 days with authorizing provider or within the authorizing provider's specialty.  See \"Patient Info\" tab in inbasket, or \"Choose Columns\" in Meds & Orders section of the refill encounter.               amphetamine-dextroamphetamine (ADDERALL XR) 20 MG 24 hr capsule 30 capsule 0     Sig: Take 1 capsule (20 mg) by mouth daily       There is no refill protocol information for this order        Routing refill request to provider for " review/approval because:  Drug not on the [facility] refill protocol   Labs not current:  RJ Hernandez RN   [facility]

## 2023-01-30 ENCOUNTER — TELEPHONE (OUTPATIENT)
Dept: BEHAVIORAL HEALTH | Facility: CLINIC | Age: 38
End: 2023-01-30
Payer: COMMERCIAL

## 2023-01-30 ENCOUNTER — PATIENT OUTREACH (OUTPATIENT)
Dept: CARE COORDINATION | Facility: CLINIC | Age: 38
End: 2023-01-30
Payer: COMMERCIAL

## 2023-01-30 NOTE — LETTER
Health Care Home - Access Care Plan    About Me:    Patient Name:  Fuentes Castillo    YOB: 1985  Age:                      38 year old   [place] MRN:     [id number] Telephone Information:   Home Phone [phone]   Mobile [phone]       Address:  [address] [place] [address] [address]  [facility] [address] Email address:  [email or web address]      Emergency Contact(s)   Name Relationship Lgl Grd Work Phone Home Phone Mobile Phone   1. ELVIS SANDERS Significant ot*  none [phone]   2. JESENIA CASTILLO Father  none [phone]             Health Maintenance:      My Access Plan  Medical Emergency 911   Questions or concerns during clinic hours Primary Clinic Line, I will call the clinic directly:   - [phone]   24 Hour Appointment Line [phone] or  [phone] [facility] ([phone]) (toll free)   24 Hour Nurse Line [phone] (toll free)   Questions or concerns outside clinic hours 24 Hour Appointment Line, I will call the after-hours on-call line:   [facility] [phone] or [phone] ([phone]) (toll-free)   Preferred Urgent Care     Preferred Hospital     Preferred Pharmacy [facility] [address]     Behavioral Health Crisis Line The National Suicide Prevention Lifeline at 1-765.616.5436 or 911                     My Care Team Members  Patient Care Team       Relationship Specialty Notifications Start End    Clinic, [facility] (Inactive) PCP - General   10/16/13     [facility]    Phone: [phone] Fax: [phone]         [id number] [facility] [address]    Washington Wynn MD Assigned PCP   12/19/21     Phone: [phone] Fax: [phone]         [address] [facility] [address] [facility] [address]    Lona Lopez LICSW Lead Care Coordinator  Admissions 12/29/22            My Medical and Care Information  Problem List   Patient Active Problem List   Diagnosis     Other acne     Social  phobia     Panic disorder without agoraphobia     Major depressive disorder, single episode, moderate (H)     Generalized anxiety disorder     Depression     Attention deficit hyperactivity disorder, predominantly inattentive type     Displacement of lumbar intervertebral disc without myelopathy      Current Medications and Allergies:  See printed Medication Report

## 2023-01-30 NOTE — PROGRESS NOTES
Clinic Care Coordination Contact  [facility]/Voicemail       Clinical Data: Care Coordinator Outreach  Outreach attempted x 1.  Left message on patient's voicemail with call back information and requested return call.  Plan: Care Coordinator will try to reach patient again in 1 month.    Lona Lopez  [facility]  Clinic Care Coordinator  [facility]  [phone]  [email or web address]

## 2023-01-30 NOTE — TELEPHONE ENCOUNTER
Writer spoke with patient and rescheduled appointment for today as provider out sick for 02/01/2023 @ 8:30 am. Tracker completed.    Luz Marina Garcia  01/30/2023  481

## 2023-02-01 ENCOUNTER — VIRTUAL VISIT (OUTPATIENT)
Dept: BEHAVIORAL HEALTH | Facility: CLINIC | Age: 38
End: 2023-02-01
Payer: COMMERCIAL

## 2023-02-01 DIAGNOSIS — F34.1 PERSISTENT DEPRESSIVE DISORDER: Primary | ICD-10-CM

## 2023-02-01 DIAGNOSIS — F90.0 ATTENTION DEFICIT HYPERACTIVITY DISORDER, PREDOMINANTLY INATTENTIVE TYPE: ICD-10-CM

## 2023-02-01 NOTE — PROGRESS NOTES
"    [facility]                                    Progress Note    Patient Name: Fuentes ANTHONY Egsgaard  Date: 2.01.2023         Service Type: Individual      Session Start Time: 0833   Session End Time: 0918     Session Length: 45 minutes    Session #: 04    Attendees: Client    Service Modality:  Video Visit:      Provider verified identity through the following two step process.  Patient provided:  Patient is known previously to provider    Telemedicine Visit: The patient's condition can be safely assessed and treated via synchronous audio and visual telemedicine encounter.      Reason for Telemedicine Visit: Patient convenience (e.g. access to timely appointments / distance to available provider)    Originating Site (Patient Location): Patient's home    Distant Site (Provider Location): [facility] [place]    Consent:  The patient/guardian has verbally consented to: the potential risks and benefits of telemedicine (video visit) versus in person care; bill my insurance or make self-payment for services provided; and responsibility for payment of non-covered services.     Patient would like the video invitation sent by:  My Chart    Mode of Communication:  Video Conference via Amwell    Distant Location (Provider):  Off-site    As the provider I attest to compliance with applicable laws and regulations related to telemedicine.    DATA  Interactive Complexity: No  Crisis: No        Progress Since Last Session (Related to Symptoms / Goals / Homework):   Symptoms: Improving pt reported no significant unwanted mental health sxs    Homework: Did not complete      Episode of Care Goals: Satisfactory progress - CONTEMPLATION (Considering change and yet undecided); Intervened by assessing the negative and positive thinking (ambivalence) about behavior change     Current / Ongoing Stressors and Concerns:   This session pt reported he has been doing \"pretty good.\" " "He began a new job, which her reported is going well. Pt continues to report he believes he needs help but is unsure what he needs to \"work on.\" This writer had requested pt have a conversation with his SO re: her perceptions of his mental health obstacles and needs. Pt reported he wasn't able to talk with her yet but that he would do so before the next session. Pt states he wants to do this because he trusts Barbi and values her perspective; believing she knows him really well. Pt's goals are necessary in order to complete tx plan, therefor tx plan will be deferred to next week. Next session 2.08.2023. Next LoC [facility] in April.     Treatment Objective(s) Addressed in This Session:   identify target behaviors     Intervention:   Psychodynamic: assisted pt in identifying patterns of internal experiences    Assessments completed prior to visit:  The following assessments were completed by patient for this visit:  [place] Suicide Severity Rating Scale (Short Version)  [place]las Suicide Severity Rating (Short Version) 11/7/2022 1/6/2023 1/12/2023 1/19/2023 2/1/2023   Over the past 2 weeks have you felt down, depressed, or hopeless? no - - - -   Over the past 2 weeks have you had thoughts of killing yourself? no - - - -   Have you ever attempted to kill yourself? no - - - -   1. Wish to be Dead (Since Last Contact) - - 0 0 0   2. Non-Specific Active Suicidal Thoughts (Since Last Contact) - - 0 0 0   Most Lethal Attempt Date - 66441 - - -   Actual Lethality/Medical Damage Code (Most Lethal Attempt) - 0 - - -   Potential Lethality Code (Most Lethal Attempt) - 0 - - -   Calculated C-SSRS Risk Score (Since Last Contact) - - No Risk Indicated No Risk Indicated No Risk Indicated         ASSESSMENT: Current Emotional / Mental Status (status of significant symptoms):   Risk status (Self / Other harm or suicidal ideation)   Patient denies current fears or concerns for personal safety.   Patient denies current or recent suicidal " ideation or behaviors.   Patient denies current or recent homicidal ideation or behaviors.   Patient denies current or recent self injurious behavior or ideation.   Patient denies other safety concerns.   Patient reports there has been no change in risk factors since their last session.     Patient reports there has been no change in protective factors since their last session.     Recommended that patient call 911 or go to the local ED should there be a change in any of these risk factors.     Appearance:   Appropriate    Eye Contact:   Good    Psychomotor Behavior: Normal    Attitude:   Cooperative  Interested Friendly   Orientation:   All   Speech    Rate / Production: Normal/ Responsive Normal     Volume:  Normal    Mood:    Normal   Affect:    Appropriate    Thought Content:  Clear    Thought Form:  Coherent  Logical    Insight:    Good      Medication Review:   No changes to current psychiatric medication(s)     Medication Compliance:   NA     Changes in Health Issues:   None reported     Chemical Use Review:   Substance Use: Chemical use reviewed, no active concerns identified      Tobacco Use: No current tobacco use.      Diagnosis:  No diagnosis found.    Collateral Reports Completed:   Not Applicable    PLAN: (Patient Tasks / Therapist Tasks / Other)  -Pt to talk with SO re: target behaviors  -Next LoC [facility] in April; next session 2.08.2023      Jenna Cerrato MA, [facility]  February 1, 2023

## 2023-02-08 ENCOUNTER — VIRTUAL VISIT (OUTPATIENT)
Dept: BEHAVIORAL HEALTH | Facility: CLINIC | Age: 38
End: 2023-02-08
Payer: COMMERCIAL

## 2023-02-08 DIAGNOSIS — F34.1 PERSISTENT DEPRESSIVE DISORDER: Primary | ICD-10-CM

## 2023-02-08 DIAGNOSIS — F41.1 GENERALIZED ANXIETY DISORDER: ICD-10-CM

## 2023-02-08 NOTE — PROGRESS NOTES
[facility]                                    Progress Note    Patient Name: Fuentes ANTHONY Egsgaard  Date: 2.08.2023         Service Type: Individual      Session Start Time: 1135  Session End Time: 1233     Session Length: 58 minutes    Session #: 05    Attendees: Client    Service Modality:  Video Visit:      Provider verified identity through the following two step process.  Patient provided:  Patient is known previously to provider    Telemedicine Visit: The patient's condition can be safely assessed and treated via synchronous audio and visual telemedicine encounter.      Reason for Telemedicine Visit: Patient convenience (e.g. access to timely appointments / distance to available provider)    Originating Site (Patient Location): Patient's home    Distant Site (Provider Location): [facility]    Consent:  The patient/guardian has verbally consented to: the potential risks and benefits of telemedicine (video visit) versus in person care; bill my insurance or make self-payment for services provided; and responsibility for payment of non-covered services.     Patient would like the video invitation sent by:  My Chart    Mode of Communication:  Video Conference via Amwell    Distant Location (Provider):  Off-site    As the provider I attest to compliance with applicable laws and regulations related to telemedicine.    DATA  Interactive Complexity: No  Crisis: No        Progress Since Last Session (Related to Symptoms / Goals / Homework):   Symptoms: No change pt continues to experience anxiety-spectrum sxs    Homework: Achieved / completed to satisfaction      Episode of Care Goals: Achieved / completed to satisfaction - PRECONTEMPLATION (Not seeing need for change); Intervened by educating the patient about the effects of current behavior on health.  Evoked information about reasons to continue behavior, express concern / recommendations,  and explored any change talk     Current / Ongoing Stressors and Concerns:   Pt completed the assignment of talking with his partner about which of pt's behaviors she considers to be problematic. Pt read the list to this writer and identified that he felt hurt by what his partner said. Writer engaged pt in processing his feelings about the list and provided support in identifying if there were any items on the list pt felt were accurate. Pt identified the need to work on the following behaviors: impulsivity, identifying emotions that lead to anger, and mood lability. In addition, pt believes he would like to work on being more honest. Writer introduced pt to core beliefs, how they are created and how they are maintained. Pt was open to clinical observation and feedback. Pt also agreed to consider whether he wants to make changes for himself or for the relationship. Pt agreed to complete the value card sort exercise, which this writer will send via email. Next session 2.15.2023.     Treatment Objective(s) Addressed in This Session:   identify personal values you hold that are not determined by the relationship or your partner     Intervention:   Psychodynamic: assisted pt in identifying patterns of internal experiences    Assessments completed prior to visit:  The following assessments were completed by patient for this visit:  [place] Suicide Severity Rating Scale (Short Version)  [place] Suicide Severity Rating (Short Version) 11/7/2022 1/6/2023 1/12/2023 1/19/2023 2/1/2023 2/8/2023   Over the past 2 weeks have you felt down, depressed, or hopeless? no - - - - -   Over the past 2 weeks have you had thoughts of killing yourself? no - - - - -   Have you ever attempted to kill yourself? no - - - - -   1. Wish to be Dead (Since Last Contact) - - 0 0 0 0   2. Non-Specific Active Suicidal Thoughts (Since Last Contact) - - 0 0 0 0   Most Lethal Attempt Date - 57066 - - - -   Actual Lethality/Medical Damage Code (Most  Lethal Attempt) - 0 - - - -   Potential Lethality Code (Most Lethal Attempt) - 0 - - - -   Calculated C-SSRS Risk Score (Since Last Contact) - - No Risk Indicated No Risk Indicated No Risk Indicated No Risk Indicated         ASSESSMENT: Current Emotional / Mental Status (status of significant symptoms):   Risk status (Self / Other harm or suicidal ideation)   Patient denies current fears or concerns for personal safety.   Patient denies current or recent suicidal ideation or behaviors.   Patient denies current or recent homicidal ideation or behaviors.   Patient denies current or recent self injurious behavior or ideation.   Patient denies other safety concerns.   Patient reports there has been no change in risk factors since their last session.     Patient reports there has been no change in protective factors since their last session.     Recommended that patient call 911 or go to the local ED should there be a change in any of these risk factors.     Appearance:   Appropriate    Eye Contact:   Good    Psychomotor Behavior: Normal  Agitated    Attitude:   Cooperative  Interested   Orientation:   All   Speech    Rate / Production: Normal/ Responsive Talkative Normal     Volume:  Normal    Mood:    Anxious  Normal   Affect:    Appropriate    Thought Content:  Clear    Thought Form:  Coherent  Logical    Insight:    Good      Medication Review:   No changes to current psychiatric medication(s)     Medication Compliance:   Yes     Changes in Health Issues:   None reported     Chemical Use Review:   Substance Use: Chemical use reviewed, no active concerns identified      Tobacco Use: No current tobacco use.      Diagnosis:  1. Persistent depressive disorder        Collateral Reports Completed:   Not Applicable    PLAN: (Patient Tasks / Therapist Tasks / Other)  -Pt to complete value card sort  -clinician complete tx plan    ______________________________________________________________________    Individual Treatment  Plan    Patient's Name: Fuentes Zamora  YOB: 1985    Date of Creation: 2.08.2023  Date Treatment Plan Last Reviewed/Revised: 2.08.2023    DSM5 Diagnoses: 300.4 (F34.1) Persistent Depressive Disorder, Early onset and Moderate or 300.02 (F41.1) Generalized Anxiety Disorder  Psychosocial / Contextual Factors: 37-year-old male from [place], [place], unemployed, single with 11-year-old son. MH hx of ADD, depression, anxiety all responsive to medications; no therapy hx.  PROMIS (reviewed every 90 days): No data    Referral / Collaboration:  Was/were discussed and patient will pursue.    Anticipated number of session for this episode of care: 6-9 sessions  Anticipation frequency of session: Weekly  Anticipated Duration of each session: 38-52 minutes  Treatment plan will be reviewed in 90 days or when goals have been changed.       MeasurableTreatment Goal(s) related to diagnosis / functional impairment(s)  Goal #1: Patient will identify anxiety triggers and maladaptive responses to them.   Objective #A    Patient will identify situations, thoughts, and behaviors that trigger anxiety.   Intervention(s)   LMHP will facilitate guided discussion.   Status: Ongoing until termination   Objective #B   Patient will identify maladaptive responses to anxiety and develop at least 2 alternative strategies for coping.   Intervention(s)   LMHP will provide psychoeducation and modeling.   Status: Ongoing until termination      Goal #2: Patient will identify cognitive distortions in relation to their anxiety and explore alternatives.   Objective #A    Patient will identify thoughts and beliefs about self and the world as they relate to anxiety.   Intervention(s)   LMHP will guide discussion and assist patient in identification of negative beliefs.   Status: Ongoing until termination       Objective #B   Patient will challenge negative cognitions.   Intervention(s)   LMHP will guide patient in examining the evidence for and  against beliefs.   Status: Ongoing until termination      Patient has not reviewed nor agreed to the above plan.      Jenna Cerrato, [facility]  February 8, 2023

## 2023-02-21 ENCOUNTER — VIRTUAL VISIT (OUTPATIENT)
Dept: BEHAVIORAL HEALTH | Facility: CLINIC | Age: 38
End: 2023-02-21
Payer: COMMERCIAL

## 2023-02-21 ENCOUNTER — TELEPHONE (OUTPATIENT)
Dept: BEHAVIORAL HEALTH | Facility: CLINIC | Age: 38
End: 2023-02-21
Payer: COMMERCIAL

## 2023-02-21 DIAGNOSIS — F90.0 ATTENTION DEFICIT HYPERACTIVITY DISORDER, PREDOMINANTLY INATTENTIVE TYPE: ICD-10-CM

## 2023-02-21 DIAGNOSIS — F41.1 GENERALIZED ANXIETY DISORDER: ICD-10-CM

## 2023-02-21 DIAGNOSIS — F34.1 PERSISTENT DEPRESSIVE DISORDER: Primary | ICD-10-CM

## 2023-02-24 ENCOUNTER — MYC REFILL (OUTPATIENT)
Dept: FAMILY MEDICINE | Facility: CLINIC | Age: 38
End: 2023-02-24
Payer: COMMERCIAL

## 2023-02-24 DIAGNOSIS — F90.0 ATTENTION DEFICIT HYPERACTIVITY DISORDER, PREDOMINANTLY INATTENTIVE TYPE: ICD-10-CM

## 2023-02-24 RX ORDER — DEXTROAMPHETAMINE SACCHARATE, AMPHETAMINE ASPARTATE MONOHYDRATE, DEXTROAMPHETAMINE SULFATE AND AMPHETAMINE SULFATE 5; 5; 5; 5 MG/1; MG/1; MG/1; MG/1
20 CAPSULE, EXTENDED RELEASE ORAL DAILY
Qty: 30 CAPSULE | Refills: 0 | Status: SHIPPED | OUTPATIENT
Start: 2023-02-24 | End: 2023-03-27

## 2023-02-24 NOTE — TELEPHONE ENCOUNTER
Requested Prescriptions   Pending Prescriptions Disp Refills     amphetamine-dextroamphetamine (ADDERALL XR) 20 MG 24 hr capsule 30 capsule 0     Sig: Take 1 capsule (20 mg) by mouth daily       There is no refill protocol information for this order        Routing refill request to provider for review/approval because:  Patient needs to be seen because it has been more than 1 year since last office visit.    Carlee Velazquez RN  [facility]

## 2023-02-25 ENCOUNTER — MYC REFILL (OUTPATIENT)
Dept: FAMILY MEDICINE | Facility: CLINIC | Age: 38
End: 2023-02-25
Payer: COMMERCIAL

## 2023-02-25 DIAGNOSIS — F90.0 ATTENTION DEFICIT HYPERACTIVITY DISORDER, PREDOMINANTLY INATTENTIVE TYPE: ICD-10-CM

## 2023-02-25 DIAGNOSIS — F32.1 MAJOR DEPRESSIVE DISORDER, SINGLE EPISODE, MODERATE (H): ICD-10-CM

## 2023-02-25 DIAGNOSIS — F41.1 GENERALIZED ANXIETY DISORDER: ICD-10-CM

## 2023-02-27 RX ORDER — DEXTROAMPHETAMINE SACCHARATE, AMPHETAMINE ASPARTATE MONOHYDRATE, DEXTROAMPHETAMINE SULFATE AND AMPHETAMINE SULFATE 5; 5; 5; 5 MG/1; MG/1; MG/1; MG/1
20 CAPSULE, EXTENDED RELEASE ORAL DAILY
Qty: 30 CAPSULE | Refills: 0 | OUTPATIENT
Start: 2023-02-27

## 2023-02-27 NOTE — TELEPHONE ENCOUNTER
Routing refill request to provider for review/approval because:  No lipid on profile    Audelia Christensen RN   [facility]

## 2023-02-27 NOTE — TELEPHONE ENCOUNTER
Duplicate refill request med was sent on 2/24/23    Audelia Christensen RN   [facility]

## 2023-02-28 RX ORDER — QUETIAPINE FUMARATE 50 MG/1
50 TABLET, FILM COATED ORAL AT BEDTIME
Qty: 90 TABLET | Refills: 0 | Status: SHIPPED | OUTPATIENT
Start: 2023-02-28 | End: 2023-05-01

## 2023-03-03 ENCOUNTER — PATIENT OUTREACH (OUTPATIENT)
Dept: CARE COORDINATION | Facility: CLINIC | Age: 38
End: 2023-03-03
Payer: COMMERCIAL

## 2023-03-03 NOTE — PROGRESS NOTES
Clinic Care Coordination Contact  [facility]/Voicemail       Clinical Data: Care Coordinator Outreach  Outreach attempted x 1.  Left message on patient's voicemail with call back information and requested return call.  Plan: Care Coordinator will try to reach patient again in 1 month.      Lona Lopez  [facility]  Clinic Care Coordinator  [facility]  [phone]  [email or web address]

## 2023-03-25 ENCOUNTER — HEALTH MAINTENANCE LETTER (OUTPATIENT)
Age: 38
End: 2023-03-25

## 2023-03-25 DIAGNOSIS — F32.1 MAJOR DEPRESSIVE DISORDER, SINGLE EPISODE, MODERATE (H): ICD-10-CM

## 2023-03-26 RX ORDER — CITALOPRAM HYDROBROMIDE 20 MG/1
TABLET ORAL
Qty: 30 TABLET | Refills: 2 | Status: SHIPPED | OUTPATIENT
Start: 2023-03-26 | End: 2023-10-17

## 2023-03-27 ENCOUNTER — MYC REFILL (OUTPATIENT)
Dept: FAMILY MEDICINE | Facility: CLINIC | Age: 38
End: 2023-03-27
Payer: COMMERCIAL

## 2023-03-27 DIAGNOSIS — F90.0 ATTENTION DEFICIT HYPERACTIVITY DISORDER, PREDOMINANTLY INATTENTIVE TYPE: ICD-10-CM

## 2023-03-28 RX ORDER — DEXTROAMPHETAMINE SACCHARATE, AMPHETAMINE ASPARTATE MONOHYDRATE, DEXTROAMPHETAMINE SULFATE AND AMPHETAMINE SULFATE 5; 5; 5; 5 MG/1; MG/1; MG/1; MG/1
20 CAPSULE, EXTENDED RELEASE ORAL DAILY
Qty: 30 CAPSULE | Refills: 0 | Status: SHIPPED | OUTPATIENT
Start: 2023-03-31 | End: 2023-05-01

## 2023-03-30 ENCOUNTER — VIRTUAL VISIT (OUTPATIENT)
Dept: FAMILY MEDICINE | Facility: CLINIC | Age: 38
End: 2023-03-30
Payer: COMMERCIAL

## 2023-03-30 DIAGNOSIS — F90.0 ATTENTION DEFICIT HYPERACTIVITY DISORDER, PREDOMINANTLY INATTENTIVE TYPE: Primary | ICD-10-CM

## 2023-03-30 DIAGNOSIS — F41.1 GENERALIZED ANXIETY DISORDER: ICD-10-CM

## 2023-03-30 DIAGNOSIS — F32.1 MAJOR DEPRESSIVE DISORDER, SINGLE EPISODE, MODERATE (H): ICD-10-CM

## 2023-03-30 PROCEDURE — 99213 OFFICE O/P EST LOW 20 MIN: CPT | Mod: VID | Performed by: FAMILY MEDICINE

## 2023-03-30 RX ORDER — DEXTROAMPHETAMINE SACCHARATE, AMPHETAMINE ASPARTATE, DEXTROAMPHETAMINE SULFATE AND AMPHETAMINE SULFATE 2.5; 2.5; 2.5; 2.5 MG/1; MG/1; MG/1; MG/1
10 TABLET ORAL DAILY
Qty: 30 TABLET | Refills: 0 | Status: SHIPPED | OUTPATIENT
Start: 2023-03-30 | End: 2023-04-05

## 2023-03-30 NOTE — PROGRESS NOTES
Fuentes is a 38 year old who is being evaluated via a billable video visit.      How would you like to obtain your AVS? WEbook  If the video visit is dropped, the invitation should be resent by: Text to cell phone: [phone]  Will anyone else be joining your video visit? No          Assessment & Plan     Attention deficit hyperactivity disorder, predominantly inattentive type  Patient reports that symptoms are under good control.  He does not feel that the capsule is lasting long enough as he is currently working 12-hour days.  He is wondering if the addition of a short acting dose is reasonable which I think it is.  Based upon his current dosing I suggested an as needed daily dose of Adderall 10 mg tablet.  Cautioned regarding appetite suppression and sleep initiation if he takes this too late in the day.  He can update me in a month or so as to how well this is working for him through WEbook and I recommended a follow-up in person visit in 3 months.  - amphetamine-dextroamphetamine (ADDERALL) 10 MG tablet; Take 1 tablet (10 mg) by mouth daily  - PRIMARY CARE FOLLOW-UP SCHEDULING; Future    Major depressive disorder, single episode, moderate (H)  Patient reports that mood symptoms are largely in remission at this time.  He is in the process of transitioning from [facility] to another group and has yet to establish care with them.  - PRIMARY CARE FOLLOW-UP SCHEDULING; Future    Generalized anxiety disorder  As above.  - PRIMARY CARE FOLLOW-UP SCHEDULING; Future             See Patient Instructions    Washington Wynn MD  [facility]   Fuentes is a 38 year old, presenting for the following health issues:  A.D.H.D  No flowsheet data found.  History of Present Illness       Reason for visit:  Medication maintenance    He eats 0-1 servings of fruits and vegetables daily.He consumes 2 sweetened beverage(s) daily.He exercises with enough effort to increase his heart rate  20 to 29 minutes per day.  He exercises with enough effort to increase his heart rate 4 days per week.   He is taking medications regularly.         Patient was seen today for follow-up of his ADHD.  Symptoms are doing well at this time.  He started a new job which is a 12-hour workday and does not feel that the current dose of the Adderall capsule is lasting long enough and wonders if a supplemental dose later in the day would be helpful.  No other side effects are noted.  He reports that other medications are up-to-date.  He has a refill of his Adderall capsule and citalopram to  later this week.  He has moved into an apartment and begin so there is a pharmacy change.  He is currently living on his own and feels like things are going well.        Review of Systems   Constitutional, HEENT, cardiovascular, pulmonary, gi and gu systems are negative, except as otherwise noted.      Objective           Vitals:  No vitals were obtained today due to virtual visit.    Physical Exam   GENERAL: Healthy, alert and no distress  EYES: Eyes grossly normal to inspection.  No discharge or erythema, or obvious scleral/conjunctival abnormalities.  RESP: No audible wheeze, cough, or visible cyanosis.  No visible retractions or increased work of breathing.    SKIN: Visible skin clear. No significant rash, abnormal pigmentation or lesions.  NEURO: Cranial nerves grossly intact.  Mentation and speech appropriate for age.  PSYCH: Mentation appears normal, affect normal/bright, judgement and insight intact, normal speech and appearance well-groomed.                Video-Visit Details    Type of service:  Video Visit   Video Start Time: 0713  Video End Time:0724    Originating Location (pt. Location): Home    Distant Location (provider location):  On-site  Platform used for Video Visit: Deonna

## 2023-03-31 ENCOUNTER — TELEPHONE (OUTPATIENT)
Dept: FAMILY MEDICINE | Facility: CLINIC | Age: 38
End: 2023-03-31
Payer: COMMERCIAL

## 2023-03-31 DIAGNOSIS — F90.0 ATTENTION DEFICIT HYPERACTIVITY DISORDER, PREDOMINANTLY INATTENTIVE TYPE: Primary | ICD-10-CM

## 2023-04-04 NOTE — TELEPHONE ENCOUNTER
Central Prior Authorization Team   Phone: [phone]      PA Initiation    Medication: amphetamine-dextroamphetamine (ADDERALL) 10 MG tablet - PA INITIATED  Insurance Company: [employer] - Phone [phone] Fax [phone]  Pharmacy Filling the Rx: [id number] - [place], [place] - [address]  AT [address]  Filling Pharmacy Phone: [phone]  Filling Pharmacy Fax:    Start Date: 4/4/2023

## 2023-04-05 ENCOUNTER — PATIENT OUTREACH (OUTPATIENT)
Dept: CARE COORDINATION | Facility: CLINIC | Age: 38
End: 2023-04-05
Payer: COMMERCIAL

## 2023-04-05 RX ORDER — DEXTROAMPHETAMINE SACCHARATE, AMPHETAMINE ASPARTATE, DEXTROAMPHETAMINE SULFATE AND AMPHETAMINE SULFATE 5; 5; 5; 5 MG/1; MG/1; MG/1; MG/1
10 TABLET ORAL DAILY PRN
Qty: 15 TABLET | Refills: 0 | Status: SHIPPED | OUTPATIENT
Start: 2023-04-05 | End: 2023-05-01

## 2023-04-05 NOTE — TELEPHONE ENCOUNTER
New script sent for 20 mg tablet with directions to take 1/2 tablet daily as needed.    Washington Wynn MD

## 2023-04-05 NOTE — PROGRESS NOTES
Clinic Care Coordination Contact  [facility]/Voicemail       Clinical Data: Care Coordinator Outreach  Outreach attempted x 1.  Left message on patient's voicemail with call back information and requested return call.  Plan: Care Coordinator will try to reach patient again in 1 month.    Lona Lopez  [facility]  Clinic Care Coordinator  [facility]  [phone]  [email or web address]

## 2023-04-30 ENCOUNTER — MYC REFILL (OUTPATIENT)
Dept: FAMILY MEDICINE | Facility: CLINIC | Age: 38
End: 2023-04-30
Payer: COMMERCIAL

## 2023-04-30 DIAGNOSIS — F41.1 GENERALIZED ANXIETY DISORDER: ICD-10-CM

## 2023-04-30 DIAGNOSIS — F32.1 MAJOR DEPRESSIVE DISORDER, SINGLE EPISODE, MODERATE (H): ICD-10-CM

## 2023-04-30 DIAGNOSIS — F90.0 ATTENTION DEFICIT HYPERACTIVITY DISORDER, PREDOMINANTLY INATTENTIVE TYPE: ICD-10-CM

## 2023-04-30 RX ORDER — DEXTROAMPHETAMINE SACCHARATE, AMPHETAMINE ASPARTATE, DEXTROAMPHETAMINE SULFATE AND AMPHETAMINE SULFATE 5; 5; 5; 5 MG/1; MG/1; MG/1; MG/1
10 TABLET ORAL DAILY PRN
Qty: 15 TABLET | Refills: 0 | Status: CANCELLED | OUTPATIENT
Start: 2023-04-30

## 2023-04-30 RX ORDER — QUETIAPINE FUMARATE 50 MG/1
50 TABLET, FILM COATED ORAL AT BEDTIME
Qty: 90 TABLET | Refills: 0 | Status: CANCELLED | OUTPATIENT
Start: 2023-04-30

## 2023-04-30 RX ORDER — DEXTROAMPHETAMINE SACCHARATE, AMPHETAMINE ASPARTATE MONOHYDRATE, DEXTROAMPHETAMINE SULFATE AND AMPHETAMINE SULFATE 5; 5; 5; 5 MG/1; MG/1; MG/1; MG/1
20 CAPSULE, EXTENDED RELEASE ORAL DAILY
Qty: 30 CAPSULE | Refills: 0 | Status: CANCELLED | OUTPATIENT
Start: 2023-04-30

## 2023-05-01 ENCOUNTER — MYC REFILL (OUTPATIENT)
Dept: FAMILY MEDICINE | Facility: CLINIC | Age: 38
End: 2023-05-01
Payer: COMMERCIAL

## 2023-05-01 DIAGNOSIS — F41.1 GENERALIZED ANXIETY DISORDER: ICD-10-CM

## 2023-05-01 DIAGNOSIS — F32.1 MAJOR DEPRESSIVE DISORDER, SINGLE EPISODE, MODERATE (H): ICD-10-CM

## 2023-05-01 DIAGNOSIS — F90.0 ATTENTION DEFICIT HYPERACTIVITY DISORDER, PREDOMINANTLY INATTENTIVE TYPE: ICD-10-CM

## 2023-05-01 NOTE — TELEPHONE ENCOUNTER
"Requested Prescriptions   Pending Prescriptions Disp Refills     QUEtiapine (SEROQUEL) 50 MG tablet 90 tablet 0     Sig: Take 1 tablet (50 mg) by mouth At Bedtime       Antipsychotic Medications Failed - 5/1/2023 11:02 AM        Failed - Lipid panel on file within the past 12 months     No lab results found.            Passed - Blood pressure under 140/90 in past 12 months     BP Readings from Last 3 Encounters:   12/27/22 132/82   11/07/22 (!) 129/90   01/15/17 136/73                 Passed - PHQ9 score less than 5 in past 6 monts     Please review last PHQ-9 score.           Passed - Heart Rate on file within past 12 months     Pulse Readings from Last 3 Encounters:   12/27/22 106   11/07/22 110   01/15/17 102               Passed - A1c or Glucose on file in past 12 months     Recent Labs   Lab Test 11/07/22  2051   *       Please review patients last 3 weights. If a weight gain of >10 lbs exists, you may refill the prescription once after instructing the patient to schedule an appointment within the next 30 days.    Wt Readings from Last 3 Encounters:   12/27/22 80.3 kg (177 lb)   11/07/22 79.4 kg (175 lb)   01/07/17 70.3 kg (155 lb)             Passed - Medication is active on med list        Passed - Patient is 18 years of age or older        Passed - Recent (6 mo) or future (30 days) visit within the authorizing provider's specialty     Patient had office visit in the last 6 months or has a visit in the next 30 days with authorizing provider or within the authorizing provider's specialty.  See \"Patient Info\" tab in inbasket, or \"Choose Columns\" in Meds & Orders section of the refill encounter.               amphetamine-dextroamphetamine (ADDERALL XR) 20 MG 24 hr capsule 30 capsule 0     Sig: Take 1 capsule (20 mg) by mouth daily       There is no refill protocol information for this order        amphetamine-dextroamphetamine (ADDERALL) 20 MG tablet 15 tablet 0     Sig: Take 0.5 tablets (10 mg) by mouth " daily as needed (ADHD)       There is no refill protocol information for this order      Routing refill request to provider for review/approval because:  Drug not on the [facility] refill protocol     Pt was last seen on 12/27/22    Carlee Velazquez RN  [facility]

## 2023-05-02 ENCOUNTER — MYC REFILL (OUTPATIENT)
Dept: FAMILY MEDICINE | Facility: CLINIC | Age: 38
End: 2023-05-02
Payer: COMMERCIAL

## 2023-05-02 DIAGNOSIS — F41.1 GENERALIZED ANXIETY DISORDER: ICD-10-CM

## 2023-05-02 DIAGNOSIS — F90.0 ATTENTION DEFICIT HYPERACTIVITY DISORDER, PREDOMINANTLY INATTENTIVE TYPE: ICD-10-CM

## 2023-05-02 DIAGNOSIS — F32.1 MAJOR DEPRESSIVE DISORDER, SINGLE EPISODE, MODERATE (H): ICD-10-CM

## 2023-05-02 RX ORDER — QUETIAPINE FUMARATE 50 MG/1
50 TABLET, FILM COATED ORAL AT BEDTIME
Qty: 90 TABLET | Refills: 0 | Status: CANCELLED | OUTPATIENT
Start: 2023-05-02

## 2023-05-02 RX ORDER — DEXTROAMPHETAMINE SACCHARATE, AMPHETAMINE ASPARTATE MONOHYDRATE, DEXTROAMPHETAMINE SULFATE AND AMPHETAMINE SULFATE 5; 5; 5; 5 MG/1; MG/1; MG/1; MG/1
20 CAPSULE, EXTENDED RELEASE ORAL DAILY
Qty: 30 CAPSULE | Refills: 0 | Status: CANCELLED | OUTPATIENT
Start: 2023-05-02

## 2023-05-02 RX ORDER — DEXTROAMPHETAMINE SACCHARATE, AMPHETAMINE ASPARTATE MONOHYDRATE, DEXTROAMPHETAMINE SULFATE AND AMPHETAMINE SULFATE 5; 5; 5; 5 MG/1; MG/1; MG/1; MG/1
20 CAPSULE, EXTENDED RELEASE ORAL DAILY
Qty: 30 CAPSULE | Refills: 0 | Status: SHIPPED | OUTPATIENT
Start: 2023-05-02 | End: 2023-05-30

## 2023-05-02 RX ORDER — DEXTROAMPHETAMINE SACCHARATE, AMPHETAMINE ASPARTATE, DEXTROAMPHETAMINE SULFATE AND AMPHETAMINE SULFATE 5; 5; 5; 5 MG/1; MG/1; MG/1; MG/1
10 TABLET ORAL DAILY PRN
Qty: 15 TABLET | Refills: 0 | Status: CANCELLED | OUTPATIENT
Start: 2023-05-02

## 2023-05-02 RX ORDER — QUETIAPINE FUMARATE 50 MG/1
50 TABLET, FILM COATED ORAL AT BEDTIME
Qty: 90 TABLET | Refills: 0 | Status: SHIPPED | OUTPATIENT
Start: 2023-05-02 | End: 2023-05-30

## 2023-05-02 RX ORDER — DEXTROAMPHETAMINE SACCHARATE, AMPHETAMINE ASPARTATE, DEXTROAMPHETAMINE SULFATE AND AMPHETAMINE SULFATE 5; 5; 5; 5 MG/1; MG/1; MG/1; MG/1
10 TABLET ORAL DAILY PRN
Qty: 15 TABLET | Refills: 0 | Status: SHIPPED | OUTPATIENT
Start: 2023-05-02 | End: 2023-05-30

## 2023-05-02 NOTE — TELEPHONE ENCOUNTER
"Requested Prescriptions   Pending Prescriptions Disp Refills     QUEtiapine (SEROQUEL) 50 MG tablet 90 tablet 0     Sig: Take 1 tablet (50 mg) by mouth At Bedtime       Antipsychotic Medications Failed - 5/2/2023 10:21 AM        Failed - Lipid panel on file within the past 12 months     No lab results found.            Passed - Blood pressure under 140/90 in past 12 months     BP Readings from Last 3 Encounters:   12/27/22 132/82   11/07/22 (!) 129/90   01/15/17 136/73                 Passed - PHQ9 score less than 5 in past 6 monts     Please review last PHQ-9 score.           Passed - Heart Rate on file within past 12 months     Pulse Readings from Last 3 Encounters:   12/27/22 106   11/07/22 110   01/15/17 102               Passed - A1c or Glucose on file in past 12 months     Recent Labs   Lab Test 11/07/22  2051   *       Please review patients last 3 weights. If a weight gain of >10 lbs exists, you may refill the prescription once after instructing the patient to schedule an appointment within the next 30 days.    Wt Readings from Last 3 Encounters:   12/27/22 80.3 kg (177 lb)   11/07/22 79.4 kg (175 lb)   01/07/17 70.3 kg (155 lb)             Passed - Medication is active on med list        Passed - Patient is 18 years of age or older        Passed - Recent (6 mo) or future (30 days) visit within the authorizing provider's specialty     Patient had office visit in the last 6 months or has a visit in the next 30 days with authorizing provider or within the authorizing provider's specialty.  See \"Patient Info\" tab in inbasket, or \"Choose Columns\" in Meds & Orders section of the refill encounter.               amphetamine-dextroamphetamine (ADDERALL XR) 20 MG 24 hr capsule 30 capsule 0     Sig: Take 1 capsule (20 mg) by mouth daily       There is no refill protocol information for this order        amphetamine-dextroamphetamine (ADDERALL) 20 MG tablet 15 tablet 0     Sig: Take 0.5 tablets (10 mg) by mouth " daily as needed (ADHD)       There is no refill protocol information for this order        Pt needs updated labs   Barbi BAER RN  [facility]

## 2023-05-05 ENCOUNTER — PATIENT OUTREACH (OUTPATIENT)
Dept: CARE COORDINATION | Facility: CLINIC | Age: 38
End: 2023-05-05
Payer: COMMERCIAL

## 2023-05-05 NOTE — PROGRESS NOTES
Clinic Care Coordination Contact  [facility]/Voicemail       Clinical Data: Care Coordinator Outreach  Outreach attempted x 3.  Left message on patient's voicemail with call back information and requested return call.  Plan: Care Coordinator will send unable to contact letter with care coordinator contact information via Kigo. Care Coordinator will do no further outreaches at this time.    Lona Lopez  [facility]  Clinic Care Coordinator  [facility]  [phone]  [email or web address]

## 2023-05-30 ENCOUNTER — MYC REFILL (OUTPATIENT)
Dept: FAMILY MEDICINE | Facility: CLINIC | Age: 38
End: 2023-05-30
Payer: COMMERCIAL

## 2023-05-30 DIAGNOSIS — F90.0 ATTENTION DEFICIT HYPERACTIVITY DISORDER, PREDOMINANTLY INATTENTIVE TYPE: ICD-10-CM

## 2023-05-30 DIAGNOSIS — F41.1 GENERALIZED ANXIETY DISORDER: ICD-10-CM

## 2023-05-30 DIAGNOSIS — F32.1 MAJOR DEPRESSIVE DISORDER, SINGLE EPISODE, MODERATE (H): ICD-10-CM

## 2023-05-30 RX ORDER — DEXTROAMPHETAMINE SACCHARATE, AMPHETAMINE ASPARTATE MONOHYDRATE, DEXTROAMPHETAMINE SULFATE AND AMPHETAMINE SULFATE 5; 5; 5; 5 MG/1; MG/1; MG/1; MG/1
20 CAPSULE, EXTENDED RELEASE ORAL DAILY
Qty: 30 CAPSULE | Refills: 0 | Status: SHIPPED | OUTPATIENT
Start: 2023-05-30 | End: 2023-06-26

## 2023-05-30 RX ORDER — DEXTROAMPHETAMINE SACCHARATE, AMPHETAMINE ASPARTATE, DEXTROAMPHETAMINE SULFATE AND AMPHETAMINE SULFATE 5; 5; 5; 5 MG/1; MG/1; MG/1; MG/1
10 TABLET ORAL DAILY PRN
Qty: 15 TABLET | Refills: 0 | Status: SHIPPED | OUTPATIENT
Start: 2023-05-30 | End: 2023-06-26

## 2023-05-30 RX ORDER — QUETIAPINE FUMARATE 50 MG/1
50 TABLET, FILM COATED ORAL AT BEDTIME
Qty: 90 TABLET | Refills: 0 | Status: SHIPPED | OUTPATIENT
Start: 2023-05-30 | End: 2023-06-26

## 2023-05-30 NOTE — TELEPHONE ENCOUNTER
"Requested Prescriptions   Pending Prescriptions Disp Refills     QUEtiapine (SEROQUEL) 50 MG tablet 90 tablet 0     Sig: Take 1 tablet (50 mg) by mouth At Bedtime       Antipsychotic Medications Failed - 5/30/2023 11:31 AM        Failed - Lipid panel on file within the past 12 months     No lab results found.            Passed - Blood pressure under 140/90 in past 12 months     BP Readings from Last 3 Encounters:   12/27/22 132/82   11/07/22 (!) 129/90   01/15/17 136/73                 Passed - PHQ9 score less than 5 in past 6 monts     Please review last PHQ-9 score.           Passed - Heart Rate on file within past 12 months     Pulse Readings from Last 3 Encounters:   12/27/22 106   11/07/22 110   01/15/17 102               Passed - A1c or Glucose on file in past 12 months     Recent Labs   Lab Test 11/07/22  2051   *       Please review patients last 3 weights. If a weight gain of >10 lbs exists, you may refill the prescription once after instructing the patient to schedule an appointment within the next 30 days.    Wt Readings from Last 3 Encounters:   12/27/22 80.3 kg (177 lb)   11/07/22 79.4 kg (175 lb)   01/07/17 70.3 kg (155 lb)             Passed - Medication is active on med list        Passed - Patient is 18 years of age or older        Passed - Recent (6 mo) or future (30 days) visit within the authorizing provider's specialty     Patient had office visit in the last 6 months or has a visit in the next 30 days with authorizing provider or within the authorizing provider's specialty.  See \"Patient Info\" tab in inbasket, or \"Choose Columns\" in Meds & Orders section of the refill encounter.               amphetamine-dextroamphetamine (ADDERALL XR) 20 MG 24 hr capsule 30 capsule 0     Sig: Take 1 capsule (20 mg) by mouth daily       There is no refill protocol information for this order        amphetamine-dextroamphetamine (ADDERALL) 20 MG tablet 15 tablet 0     Sig: Take 0.5 tablets (10 mg) by mouth " daily as needed (ADHD)       There is no refill protocol information for this order        Routing refill request to provider for review/approval because:  Drug not on the [facility] refill protocol     Demario Galvan RN  [facility]

## 2023-06-26 ENCOUNTER — MYC REFILL (OUTPATIENT)
Dept: FAMILY MEDICINE | Facility: CLINIC | Age: 38
End: 2023-06-26
Payer: COMMERCIAL

## 2023-06-26 DIAGNOSIS — F90.0 ATTENTION DEFICIT HYPERACTIVITY DISORDER, PREDOMINANTLY INATTENTIVE TYPE: ICD-10-CM

## 2023-06-26 DIAGNOSIS — F32.1 MAJOR DEPRESSIVE DISORDER, SINGLE EPISODE, MODERATE (H): ICD-10-CM

## 2023-06-26 DIAGNOSIS — F41.1 GENERALIZED ANXIETY DISORDER: ICD-10-CM

## 2023-06-26 RX ORDER — QUETIAPINE FUMARATE 50 MG/1
50 TABLET, FILM COATED ORAL AT BEDTIME
Qty: 90 TABLET | Refills: 0 | Status: SHIPPED | OUTPATIENT
Start: 2023-06-26 | End: 2023-07-25

## 2023-06-26 RX ORDER — DEXTROAMPHETAMINE SACCHARATE, AMPHETAMINE ASPARTATE, DEXTROAMPHETAMINE SULFATE AND AMPHETAMINE SULFATE 5; 5; 5; 5 MG/1; MG/1; MG/1; MG/1
10 TABLET ORAL DAILY PRN
Qty: 15 TABLET | Refills: 0 | Status: SHIPPED | OUTPATIENT
Start: 2023-06-30 | End: 2023-07-25

## 2023-06-26 RX ORDER — DEXTROAMPHETAMINE SACCHARATE, AMPHETAMINE ASPARTATE MONOHYDRATE, DEXTROAMPHETAMINE SULFATE AND AMPHETAMINE SULFATE 5; 5; 5; 5 MG/1; MG/1; MG/1; MG/1
20 CAPSULE, EXTENDED RELEASE ORAL DAILY
Qty: 30 CAPSULE | Refills: 0 | Status: SHIPPED | OUTPATIENT
Start: 2023-06-30 | End: 2023-07-25

## 2023-06-26 NOTE — TELEPHONE ENCOUNTER
"Requested Prescriptions   Pending Prescriptions Disp Refills     QUEtiapine (SEROQUEL) 50 MG tablet 90 tablet 0     Sig: Take 1 tablet (50 mg) by mouth At Bedtime       Antipsychotic Medications Failed - 6/26/2023 10:57 AM        Failed - Lipid panel on file within the past 12 months     No lab results found.            Passed - Blood pressure under 140/90 in past 12 months     BP Readings from Last 3 Encounters:   12/27/22 132/82   11/07/22 (!) 129/90   01/15/17 136/73                 Passed - PHQ9 score less than 5 in past 6 monts     Please review last PHQ-9 score.           Passed - Heart Rate on file within past 12 months     Pulse Readings from Last 3 Encounters:   12/27/22 106   11/07/22 110   01/15/17 102               Passed - A1c or Glucose on file in past 12 months     Recent Labs   Lab Test 11/07/22  2051   *       Please review patients last 3 weights. If a weight gain of >10 lbs exists, you may refill the prescription once after instructing the patient to schedule an appointment within the next 30 days.    Wt Readings from Last 3 Encounters:   12/27/22 80.3 kg (177 lb)   11/07/22 79.4 kg (175 lb)   01/07/17 70.3 kg (155 lb)             Passed - Medication is active on med list        Passed - Patient is 18 years of age or older        Passed - Recent (6 mo) or future (30 days) visit within the authorizing provider's specialty     Patient had office visit in the last 6 months or has a visit in the next 30 days with authorizing provider or within the authorizing provider's specialty.  See \"Patient Info\" tab in inbasket, or \"Choose Columns\" in Meds & Orders section of the refill encounter.               amphetamine-dextroamphetamine (ADDERALL XR) 20 MG 24 hr capsule 30 capsule 0     Sig: Take 1 capsule (20 mg) by mouth daily       There is no refill protocol information for this order        amphetamine-dextroamphetamine (ADDERALL) 20 MG tablet 15 tablet 0     Sig: Take 0.5 tablets (10 mg) by mouth " daily as needed (ADHD)       There is no refill protocol information for this order      Routing refill request to provider for review/approval because:  Drug not on the [facility] refill protocol     Pt had a virtual visit on 03/30/23 and a office visit last on 12/28/22    Carlee Velazquez RN  [facility]

## 2023-06-28 ENCOUNTER — TELEPHONE (OUTPATIENT)
Dept: FAMILY MEDICINE | Facility: CLINIC | Age: 38
End: 2023-06-28
Payer: COMMERCIAL

## 2023-06-28 DIAGNOSIS — F90.0 ATTENTION DEFICIT HYPERACTIVITY DISORDER, PREDOMINANTLY INATTENTIVE TYPE: ICD-10-CM

## 2023-06-28 RX ORDER — DEXTROAMPHETAMINE SACCHARATE, AMPHETAMINE ASPARTATE, DEXTROAMPHETAMINE SULFATE AND AMPHETAMINE SULFATE 5; 5; 5; 5 MG/1; MG/1; MG/1; MG/1
10 TABLET ORAL DAILY PRN
Qty: 15 TABLET | Refills: 0 | Status: CANCELLED | OUTPATIENT
Start: 2023-06-30

## 2023-06-28 NOTE — TELEPHONE ENCOUNTER
Can you please refill fill today. Refill is early as pt is going out of town today?  Pharmacy is able to do so.    amphetamine-dextroamphetamine (ADDERALL) 20 MG tablet 15 table  6/30/2023  Med pended.    Thanks,   Demario Galvan RN  [facility]

## 2023-06-28 NOTE — TELEPHONE ENCOUNTER
Refill not needed as was sent over two days ago.  Please call the pharmacy and let them know it is ok to fill the medication today (it had a fill date of 6/30 on it).    Washington Wynn MD

## 2023-06-28 NOTE — TELEPHONE ENCOUNTER
Called pharmacy per note below and ok'd refill.    Avinash Hernandez RN   [facility]

## 2023-06-28 NOTE — TELEPHONE ENCOUNTER
Requested Prescriptions   Pending Prescriptions Disp Refills     amphetamine-dextroamphetamine (ADDERALL) 20 MG tablet 15 tablet 0     Sig: Take 0.5 tablets (10 mg) by mouth daily as needed (ADHD)       There is no refill protocol information for this order        Routing refill request to provider for review/approval because:  Drug not on the [facility] refill protocol     Demario Galvan RN  [facility]

## 2023-07-06 ENCOUNTER — VIRTUAL VISIT (OUTPATIENT)
Dept: BEHAVIORAL HEALTH | Facility: CLINIC | Age: 38
End: 2023-07-06
Payer: COMMERCIAL

## 2023-07-06 DIAGNOSIS — F90.0 ATTENTION DEFICIT HYPERACTIVITY DISORDER, PREDOMINANTLY INATTENTIVE TYPE: ICD-10-CM

## 2023-07-06 DIAGNOSIS — F34.1 PERSISTENT DEPRESSIVE DISORDER: Primary | ICD-10-CM

## 2023-07-06 ASSESSMENT — COLUMBIA-SUICIDE SEVERITY RATING SCALE - C-SSRS
ATTEMPT SINCE LAST CONTACT: NO
6. HAVE YOU EVER DONE ANYTHING, STARTED TO DO ANYTHING, OR PREPARED TO DO ANYTHING TO END YOUR LIFE?: NO
TOTAL  NUMBER OF INTERRUPTED ATTEMPTS SINCE LAST CONTACT: NO
TOTAL  NUMBER OF ABORTED OR SELF INTERRUPTED ATTEMPTS SINCE LAST CONTACT: NO
1. SINCE LAST CONTACT, HAVE YOU WISHED YOU WERE DEAD OR WISHED YOU COULD GO TO SLEEP AND NOT WAKE UP?: YES
2. HAVE YOU ACTUALLY HAD ANY THOUGHTS OF KILLING YOURSELF?: NO
SUICIDE, SINCE LAST CONTACT: NO

## 2023-07-06 ASSESSMENT — ANXIETY QUESTIONNAIRES
GAD7 TOTAL SCORE: 5
2. NOT BEING ABLE TO STOP OR CONTROL WORRYING: SEVERAL DAYS
5. BEING SO RESTLESS THAT IT IS HARD TO SIT STILL: SEVERAL DAYS
1. FEELING NERVOUS, ANXIOUS, OR ON EDGE: SEVERAL DAYS
4. TROUBLE RELAXING: SEVERAL DAYS
6. BECOMING EASILY ANNOYED OR IRRITABLE: NOT AT ALL
3. WORRYING TOO MUCH ABOUT DIFFERENT THINGS: SEVERAL DAYS
GAD7 TOTAL SCORE: 5
IF YOU CHECKED OFF ANY PROBLEMS ON THIS QUESTIONNAIRE, HOW DIFFICULT HAVE THESE PROBLEMS MADE IT FOR YOU TO DO YOUR WORK, TAKE CARE OF THINGS AT HOME, OR GET ALONG WITH OTHER PEOPLE: VERY DIFFICULT
7. FEELING AFRAID AS IF SOMETHING AWFUL MIGHT HAPPEN: NOT AT ALL

## 2023-07-06 ASSESSMENT — PATIENT HEALTH QUESTIONNAIRE - PHQ9: SUM OF ALL RESPONSES TO PHQ QUESTIONS 1-9: 8

## 2023-07-06 NOTE — PROGRESS NOTES
Transition Clinic - Initial Visit Progress Note    Patient  Name: Fuentes Zamora, : 1985    Date:  2023       Service Type:  Mental Health Initial Visit     Visit Start Time: 935      Visit End Time:     Session Length:   TC Session Length: 45 (38-52 Minutes)    Visit #:  1            Attendees:  TC Attendees: Client alone    Service Modality:  Service Modality: Video Visit:      Provider verified identity through the following two step process.  Patient provided:  Patient is known previously to provider    Telemedicine Visit: The patient's condition can be safely assessed and treated via synchronous audio and visual telemedicine encounter.      Reason for Telemedicine Visit: Patient convenience (e.g. access to timely appointments / distance to available provider)    Originating Site (Patient Location): Patient's home    Distant Site (Provider Location): [facility]    Consent:  The patient/guardian has verbally consented to: the potential risks and benefits of telemedicine (video visit) versus in person care; bill my insurance or make self-payment for services provided; and responsibility for payment of non-covered services.     Patient would like the video invitation sent by:  My Chart    Mode of Communication:  Video Conference via [facility]    Distant Location (Provider):  Off-site    As the provider I attest to compliance with applicable laws and regulations related to telemedicine.       Presenting Concerns/  Current Stressors:  Fuentes Zamora is a 38 year old identified male who was referred to the Transition Clinic by self for support related to depressive-spectrum sxs and securing long-term care.  This writer had previously worked with Mr. Zamora (2023-2023) and received an e-mail requesting another session(s) due to increase in depression and subsequent reduction in overall functioning and personal effectiveness. In  "addition, pt reported he did not connect with previous referral for long-term therapy provided by this clinician.     This session, pt described events since February 2023 and shared he has been struggling with increased depression and SI off and on since then. He denied acute suicide risk, stating, \"I'd never actually kill myself; I'm too weak to do that.\" Pt shared he lost his job, has had interpersonal stress within his on-off relationship with his son's mother, and two weeks ago experienced the completed suicide of his best friend. This is the fourth death of a close friend that pt has experienced in his adult life. Pt seems to minimize the impact of these stressors and uses humor at baseline to manage intense emotions but did state that the \"past couple of months have been really hard.\"     This writer reviewed the limited role of a transition clinic therapist and agreed to work with pt until an appropriate long-term therapist can be secured. Clinician will assist in this process as necessary.       ASSESSMENT:  Mental Status Assessment:  Appearance:   Appropriate   Eye Contact:   Good   Psychomotor Behavior: Normal   Attitude:   Cooperative  Interested Friendly  Orientation:   All  Speech     Rate / Production:  Normal/ Responsive     Volume:   Normal   Mood:    Anxious  Normal  Affect:    Appropriate   Thought Content:  Clear   Thought Form:  Coherent  Goal Directed   Insight:    Good       The following assessments were completed by patient for this visit:  PHQ9:       5/15/2012     9:00 AM 12/14/2021     2:11 PM 1/11/2022     2:04 PM 5/20/2022     5:48 PM 12/27/2022     8:46 AM 1/6/2023     1:19 AM 7/6/2023     9:00 AM   PHQ-9 SCORE   PHQ-9 Total Score 8         PHQ-9 Total Score MyChart    8 (Mild depression) 6 (Mild depression) 4 (Minimal depression)    PHQ-9 Total Score  18 6 8 6 4 8     GAD7:       5/8/2012    12:01 PM 5/15/2012     9:40 AM 12/14/2021     2:11 PM 1/11/2022     2:04 PM 5/20/2022     5:48 " "PM 1/6/2023     1:18 AM 7/6/2023    10:00 AM   YANA-7 SCORE   Total Score 8 8        Total Score     6 (mild anxiety) 7 (mild anxiety)    Total Score   16 5 6 7 5     CAGE-AID:       1/6/2023     1:16 AM 7/6/2023    10:00 AM   CAGE-AID Total Score   Total Score 1 2   Total Score MyChart 1 (A total score of 2 or greater is considered clinically significant)      PROMIS 10-Global Health (only subscores and total score):       1/6/2023     1:21 AM   PROMIS-10 Scores Only   Global Mental Health Score 8   Global Physical Health Score 17   PROMIS TOTAL - SUBSCORES 25     [place] Suicide Severity Rating Scale (Short Version)      1/6/2023     8:00 AM 1/12/2023     9:00 AM 1/19/2023     4:00 PM 2/1/2023     9:00 AM 2/8/2023     1:00 PM 2/21/2023    12:00 PM 7/6/2023     9:00 AM   [place] Suicide Severity Rating (Short Version)   1. Wish to be Dead (Since Last Contact)  N N N N N Y   Wish to be Dead Description (Since Last Contact)       \"I can't do this\"   2. Non-Specific Active Suicidal Thoughts (Since Last Contact)  N N N N N N   Actual Attempt (Since Last Contact)       N   Has subject engaged in non-suicidal self-injurious behavior? (Since Last Contact)       N   Interrupted Attempts (Since Last Contact)       N   Aborted or Self-Interrupted Attempt (Since Last Contact)       N   Preparatory Acts or Behavior (Since Last Contact)       N   Suicide (Since Last Contact)       N   Most Lethal Attempt Date 11/28/2022         Actual Lethality/Medical Damage Code (Most Lethal Attempt) 0         Potential Lethality Code (Most Lethal Attempt) 0         Calculated C-SSRS Risk Score (Since Last Contact)  No Risk Indicated No Risk Indicated No Risk Indicated No Risk Indicated No Risk Indicated Low Risk       Patient denies current fears or concerns for personal safety.  Patient reports the following current or recent suicidal ideation or behaviors: Pt endorsed passive SI over the past several months.  Patient denies current or " "recent homicidal ideation or behaviors.  Patient denies current or recent self injurious behavior or ideation.  Patient denies other safety concerns.  Recommended that patient call 911 or go to the local ED should there be a change in any of these risk factors.  Patient reports there are no firearms in the house.    Suicide Risk and Safety Concerns were assessed for. Patient meets the following risk assessment and triage: When the [place] Suicide Severity Rating Scale has been completed, the patient identifies lifetime history of suicidal ideation and/or Suicidal Behavior that is greater than 10 years.      The recommendation is to provide the Brief Safety Plan: Clinical judgement indicates pt is not at risk for suicide as evidenced by pt denial of plan or intent, stating, \"I would never kill myself; I'm too weak to do that.\"  In addition, there are no concerns that pt's reporting and intent are at odds.    Reviewed safety plan from 11.22.2022 date and made the following changes/additions none     Diagnostic Criteria:  Generalized Anxiety Disorder  A. Excessive anxiety and worry about a number of events or activities (such as work or school performance).   B. The person finds it difficult to control the worry.  C. Select 3 or more symptoms (required for diagnosis). Only one item is required in children.   - Restlessness or feeling keyed up or on edge.    - Difficulty concentrating or mind going blank.    - Irritability.   D. The focus of the anxiety and worry is not confined to features of an Axis I disorder.  E. The anxiety, worry, or physical symptoms cause clinically significant distress or impairment in social, occupational, or other important areas of functioning.   F. The disturbance is not due to the direct physiological effects of a substance (e.g., a drug of abuse, a medication) or a general medical condition (e.g., hyperthyroidism) and does not occur exclusively during a Mood Disorder, a Psychotic " Disorder, or a Pervasive Developmental Disorder.    - The aformentioned symptoms began 30 year(s) ago and occurs 7 days per week and is experienced as varied depending on the situation.  Persistent Depressive Disorder  A. Depressed mood for most of the day, for more days than not, as indicated either by subjective account or observation by others, for at least 2 years. Note: In children and adolescents, mood can be irritable and duration must be at least 1 year.   B. Presence, while depressed, of two (or more) of the following:        - insomnia or hypersomnia       - low self-esteem        - poor concentration or difficulty making decisions        - feelings of hopelessness   C. During the 2-year period (1 year for children or adolescents) of the disturbance, the person has never been without the symptoms in Criteria A and B for more than 2 months at a time.  D. Criteria for a major depressive disorder may be continously present for 2 years  E. There has never been a Manic Episode, a Mixed Episode, or a Hypomanic Episode, and criteria have never been met for Cyclothymic Disorder.   F. The disturbance is not better explained by a persistent schizoaffective disorder, schizophrenia, delusional disorder, or other specified or unspecified schizophrenia spectrum and other psychotic disorder  G. The symptoms are not attributable to the physiological effects of a substance (e.g., a drug of abuse, a medication) or another medical condition (e.g., hypothyroidism).   H. The symptoms cause clinically significant distress or impairment in social, occupational, or other important areas of functioning.        - Early Onset: onset is before age 21 years   Attention Deficit Hyperactivity Disorder  A) A persistent pattern of inattention and/or hyperactivity-impulsivity that interferes with functioning or development, as characterized by (1) Inattention and/or (2) Hyperactivity and Impulsivity  (1) Inattention: 6 or more of the  "following symptoms have persisted for at least 6 months to a degree that is inconsistent with developmental level and that negatively impacts directly on social and academic/occupational activities:  - Often fails to give close attention to details or makes careless mistakes in schoolwork, at work, or during other activities  - Often has difficulty sustaining attention in tasks or play activities  - Often does not seem to listen when spoken to directly  - Often does not follow through on instructions and fails to finish schoolwork, chores, or duties in the workplace  - Often has difficulty organizing tasks and activities  - Often avoids, dislikes, or is reluctant to engage in tasks that require sustained mental effort  - Often loses things necessary for tasks or activities  - Is often easily distractedby extraneous stimuli  - Is often forgetful in daily activities  (2) Hyeractivity and Impulsivity: 6 or more of the following symptoms have persisted for at least 6 months to a degree that is inconsistent with developmental level and that negatively impacts directly on social and academic/occupational activities:  - Often fidgets with or taps hands or feet or squirms in seat  - Is often \"on the go,\" acting as if \"driven by a motor\"  - Often blurts out an answer before a question has been completed  - Often interrupts or intrudes on others  B) Several inattentive or hyperactive-impulsive symptoms were present prior to age 12 years  C) Several inattentive or hyperactive-impulsive symptoms are present in two or more settings  D) There is clear evidence that the symptoms interfere with, or reduce the quality of, social academic, or occupational functioning  E) The Symptoms do not occur exclusively during the course of schizophrenia or another psychotic disorder and are not better explained by another mental disorder     DSM5 Diagnoses: (Sustained by DSM5 Criteria Listed Above)  Diagnoses: Attention-Deficit/Hyperactivity " Disorder  314.00 (F90.0) Predominantly inattentive presentation  300.4 (F34.1) Persistent Depressive Disorder, Early onset, With intermittent major depressive episodes, with current episodes and Moderate  Psychosocial & Contextual Factors: 37-year-old male from [place], [place], unemployed, single with 11-year-old son and good relationship with child's mother. MH hx of ADD, depression, anxiety all responsive to medications; no therapy hx.      Intervention:   Psychodynamic- Patient processed internal experiences , MI-  Patient was educated on following motivational interviewing skill empathic listening, open-ended questions, complex reflections, reframing and Solution-focused: identified goals, objectives, and possible barriers to success    Collateral Reports Completed:  TC Collateral: [facility] electronic medical records reviewed.    DATA:  Interactive Complexity: No  Crisis: No    PLAN: (Homework, other):  1. Provider will continue Diagnostic Assessment. Initial Treatment will focus on: Depressed Mood - Identify triggers and appropriate short-term interventions.  2.   Provider recommended the following referrals: TBD; will discuss next session.    3.   Information in this assessment was obtained from the medical record and provided by patient who is a good historian.   4.   Follow up scheduled:  7.13.2023        Patient was given the following to do until next session:  Identify long-term needs  5.  Anticipated Discharge: Anticipated Discharge Time: < 1 Month     Procedure Code:  TC-CPT-CODES: Psychotherapy (with patient) - 45 [CPT 63922]    NNEKA Munson, [facility]  7.06.2023    Suicide Risk and Safety Concerns were assessed for. Patient meets the following risk assessment and triage: When the [place] Suicide Severity Rating Scale has been completed, the patient identifies lifetime history of suicidal ideation and/or Suicidal Behavior that is greater than 10 years.      The recommendation is to provide the  "Brief Safety Plan: Clinical judgement indicates pt is not at risk for suicide as evidenced by pt denial of plan or intent, stating, \"I would never kill myself; I'm too weak to do that.\"  In addition, there are no concerns that pt's reporting and intent are at odds.    Reviewed safety plan from 11.22.2022 date and made the following changes/additions none        "

## 2023-07-13 ENCOUNTER — VIRTUAL VISIT (OUTPATIENT)
Dept: BEHAVIORAL HEALTH | Facility: CLINIC | Age: 38
End: 2023-07-13
Payer: COMMERCIAL

## 2023-07-13 DIAGNOSIS — F34.1 PERSISTENT DEPRESSIVE DISORDER: Primary | ICD-10-CM

## 2023-07-13 DIAGNOSIS — F90.0 ATTENTION DEFICIT HYPERACTIVITY DISORDER, PREDOMINANTLY INATTENTIVE TYPE: ICD-10-CM

## 2023-07-13 DIAGNOSIS — F41.1 GENERALIZED ANXIETY DISORDER: ICD-10-CM

## 2023-07-13 ASSESSMENT — COLUMBIA-SUICIDE SEVERITY RATING SCALE - C-SSRS
ATTEMPT SINCE LAST CONTACT: NO
6. HAVE YOU EVER DONE ANYTHING, STARTED TO DO ANYTHING, OR PREPARED TO DO ANYTHING TO END YOUR LIFE?: NO
TOTAL  NUMBER OF ABORTED OR SELF INTERRUPTED ATTEMPTS SINCE LAST CONTACT: NO
1. SINCE LAST CONTACT, HAVE YOU WISHED YOU WERE DEAD OR WISHED YOU COULD GO TO SLEEP AND NOT WAKE UP?: NO
SUICIDE, SINCE LAST CONTACT: NO
TOTAL  NUMBER OF INTERRUPTED ATTEMPTS SINCE LAST CONTACT: NO
2. HAVE YOU ACTUALLY HAD ANY THOUGHTS OF KILLING YOURSELF?: NO

## 2023-07-13 NOTE — PROGRESS NOTES
Transition Clinic Progress Note    Patient Name:   Fuentes ANTHONY Egsgaard Date: July 13, 2023          Service Type: Individual      Session Start Time: 0737  Session End Time: 0830     Session Length:  TC Session Length: 60 (53+ Minutes)    Session #: 03    Attendees: TC Attendees: Client alone    Service Modality:  Service Modality: Video Visit:      Provider verified identity through the following two step process.  Patient provided:  Patient is known previously to provider    Telemedicine Visit: The patient's condition can be safely assessed and treated via synchronous audio and visual telemedicine encounter.      Reason for Telemedicine Visit: Patient convenience (e.g. access to timely appointments / distance to available provider)    Originating Site (Patient Location): Patient's home    Distant Site (Provider Location): [facility]    Consent:  The patient/guardian has verbally consented to: the potential risks and benefits of telemedicine (video visit) versus in person care; bill my insurance or make self-payment for services provided; and responsibility for payment of non-covered services.     Patient would like the video invitation sent by:  My Chart    Mode of Communication:  Video Conference via [facility]    Distant Location (Provider):  Off-site    As the provider I attest to compliance with applicable laws and regulations related to telemedicine.  DATA  Interactive Complexity: No  Crisis: No        Progress Since Last Session (Related to Symptoms / Goals / Homework):   Symptoms: No change pt continues to experience depression and anxiety-spectrum sxs  Homework: Completed in session      Episode of Care Goals: Achieved / completed to satisfaction - CONTEMPLATION (Considering change and yet undecided); Intervened by assessing the negative and positive thinking (ambivalence) about behavior change     Current / Ongoing Stressors and Concerns:   This session pt  "shared he continues to experience anxiety daily. He describes this as \"nothing new\" and something that has been present for him since apx age 8. While this has been discussed with pt in previous sessions, pt shared he recalls noticing anxiety-spectrum symptoms as early as age 8. He reported it came on suddenly and rather than telling anyone he began to withdraw and disengage socially, as that is when he experienced the most troublesome sxs. Pt was diagnosed with ADHD as an adult and reported the medication helps with focus but he continues to struggle with pervasive low-level depression-spectrum sxs and anxiety daily. Writer guided discussion to identify what changes pt would like to see in his life in order to feel more fulfilled. Pt identified working in a job with purpose as one of the things he'd like to change. Pt shared he's always wanted to be a [place] . Pt and writer agreed to discuss options related to this dream at next session.      Treatment Objective(s) Addressed in This Session:   use cognitive strategies identified in therapy to challenge anxious thoughts  Identify negative self-talk and behaviors: challenge core beliefs, myths, and actions  Improve concentration, focus, and mindfulness in daily activities      Intervention:   Existential Therapy - Learning what it means to be human.  Goal of therapy is to expand self-awareness and increase choice potentials., Motivational Interviewing - helping to find the motivation to make positive behavior changes., Person-Centered Therapy - Actualizes potential and moves towards increased awareness, spontaneity, trust in self and inner directedness. and Problem-Solving Therapy (PST) - Identify specific problems; brainstorming, evaluation, implementing and reviewing solutions.    Assessments completed prior to visit:  The following assessments were completed by patient for this visit:  PROMIS 10-Global Health (only subscores and total score): " "      1/6/2023     1:21 AM 7/13/2023     7:00 AM   PROMIS-10 Scores Only   Global Mental Health Score 8 8   Global Physical Health Score 17 19   PROMIS TOTAL - SUBSCORES 25 27     [place] Suicide Severity Rating Scale (Short Version)      1/12/2023     9:00 AM 1/19/2023     4:00 PM 2/1/2023     9:00 AM 2/8/2023     1:00 PM 2/21/2023    12:00 PM 7/6/2023     9:00 AM 7/13/2023     7:00 AM   [place] Suicide Severity Rating (Short Version)   1. Wish to be Dead (Since Last Contact) N N N N N Y N   Wish to be Dead Description (Since Last Contact)      \"I can't do this\"    2. Non-Specific Active Suicidal Thoughts (Since Last Contact) N N N N N N N   Actual Attempt (Since Last Contact)      N N   Has subject engaged in non-suicidal self-injurious behavior? (Since Last Contact)      N N   Interrupted Attempts (Since Last Contact)      N N   Aborted or Self-Interrupted Attempt (Since Last Contact)      N N   Preparatory Acts or Behavior (Since Last Contact)      N N   Suicide (Since Last Contact)      N N   Calculated C-SSRS Risk Score (Since Last Contact) No Risk Indicated No Risk Indicated No Risk Indicated No Risk Indicated No Risk Indicated Low Risk No Risk Indicated         ASSESSMENT: Current Emotional / Mental Status (status of significant symptoms):   Risk status (Self / Other harm or suicidal ideation)   Patient denies current fears or concerns for personal safety.   Patient denies current or recent suicidal ideation or behaviors.   Patient denies current or recent homicidal ideation or behaviors.   Patient denies current or recent self injurious behavior or ideation.   Patient denies other safety concerns.   Patient reports there has been no change in risk factors since their last session.     Patient reports there has been no change in protective factors since their last session.     Recommended that patient call 911 or go to the local ED should there be a change in any of these risk " factors.     Appearance:   Appropriate    Eye Contact:   Good    Psychomotor Behavior: Normal    Attitude:   Cooperative    Orientation:   All   Speech    Rate / Production: Normal     Volume:  Normal    Mood:    Normal   Affect:    Appropriate    Thought Content:  Clear    Thought Form:  Coherent  Logical    Insight:    Fair      Medication Review:   No changes to current psychiatric medication(s)     Medication Compliance:   Yes however, pt reported he ran out of Seroquel sooner than expected and cannot refill until the end of the month. Writer encouraged pt to discuss openly with prescriber     Changes in Health Issues:   None reported     Chemical Use Review:   Substance Use: Chemical use reviewed, no active concerns identified      Tobacco Use: No change in amount of tobacco use since last session.  Pt vapes; did not discuss use this session    Diagnoses:   Attention-Deficit/Hyperactivity Disorder  314.00 (F90.0) Predominantly inattentive presentation  300.4 (F34.1) Persistent Depressive Disorder, Early onset and Moderate  300.02 (F41.1) Generalized Anxiety Disorder    Collateral Reports Completed:    Collateral: [facility] electronic medical records reviewed.    PLAN: (Patient Tasks / Therapist Tasks / Other)  -identify long-term therapy goals and appropriate referrals    Procedure Code: Psychotherapy (with patient) - 60 [CPT 09701]    NNEKA Munson, [facility]  July 12, 2023

## 2023-07-20 ENCOUNTER — VIRTUAL VISIT (OUTPATIENT)
Dept: BEHAVIORAL HEALTH | Facility: CLINIC | Age: 38
End: 2023-07-20
Payer: COMMERCIAL

## 2023-07-20 DIAGNOSIS — F90.0 ATTENTION DEFICIT HYPERACTIVITY DISORDER, PREDOMINANTLY INATTENTIVE TYPE: ICD-10-CM

## 2023-07-20 DIAGNOSIS — F41.1 GENERALIZED ANXIETY DISORDER: ICD-10-CM

## 2023-07-20 DIAGNOSIS — F34.1 PERSISTENT DEPRESSIVE DISORDER: Primary | ICD-10-CM

## 2023-07-20 ASSESSMENT — COLUMBIA-SUICIDE SEVERITY RATING SCALE - C-SSRS
2. HAVE YOU ACTUALLY HAD ANY THOUGHTS OF KILLING YOURSELF?: NO
TOTAL  NUMBER OF ABORTED OR SELF INTERRUPTED ATTEMPTS SINCE LAST CONTACT: NO
SUICIDE, SINCE LAST CONTACT: NO
6. HAVE YOU EVER DONE ANYTHING, STARTED TO DO ANYTHING, OR PREPARED TO DO ANYTHING TO END YOUR LIFE?: NO
ATTEMPT SINCE LAST CONTACT: NO
TOTAL  NUMBER OF INTERRUPTED ATTEMPTS SINCE LAST CONTACT: NO
1. SINCE LAST CONTACT, HAVE YOU WISHED YOU WERE DEAD OR WISHED YOU COULD GO TO SLEEP AND NOT WAKE UP?: NO

## 2023-07-20 NOTE — PROGRESS NOTES
Transition Clinic Progress Note    Patient Name:   Fuentes ANTHONY Egsgaard Date: July 20, 2023          Service Type: Individual      Session Start Time: 0733  Session End Time: 0825     Session Length:  TC Session Length: 60 (53+ Minutes)    Session #: 03    Attendees: TC Attendees: Client alone    Service Modality:  Service Modality: Video Visit:      Provider verified identity through the following two step process.  Patient provided:  Patient is known previously to provider    Telemedicine Visit: The patient's condition can be safely assessed and treated via synchronous audio and visual telemedicine encounter.      Reason for Telemedicine Visit: Patient convenience (e.g. access to timely appointments / distance to available provider)    Originating Site (Patient Location): Patient's home    Distant Site (Provider Location): [facility]    Consent:  The patient/guardian has verbally consented to: the potential risks and benefits of telemedicine (video visit) versus in person care; bill my insurance or make self-payment for services provided; and responsibility for payment of non-covered services.     Patient would like the video invitation sent by:  My Chart    Mode of Communication:  Video Conference via [facility]    Distant Location (Provider):  Off-site    As the provider I attest to compliance with applicable laws and regulations related to telemedicine.    Informed Consent and Assessment Methods    This provider and patient discussed HIPAA, and the limits of confidentiality; including mandated reporting, the possibility of collaborative discussions with patient's primary care provider and the multidisciplinary team in the MH clinic during consultation.  Discussed the no show policy, and the benefits and possible unintended consequences of therapy.  Patient indicated understanding Transition Clinic services are short term, solution focused, until a referral can be  made and patient can bridge to long term therapy.  Patient verbally indicated understanding the informed consent.          DATA  Interactive Complexity: No  Crisis: No        Progress Since Last Session (Related to Symptoms / Goals / Homework):   Symptoms: No change Pt continues to experience depressive-spectrum sxs  Homework: Completed in session      Episode of Care Goals: Achieved / completed to satisfaction - CONTEMPLATION (Considering change and yet undecided); Intervened by assessing the negative and positive thinking (ambivalence) about behavior change     Current / Ongoing Stressors and Concerns:   This session pt and writer discussed pt's desire to be an . 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 Together writer and pt researched requirements for earning a teaching certificate in the [facility], identified that not all felonies prevent empolyment as a teacher, and made a list of area schools offering teaching certificate programs. PT agreed to contact three schools to inquire about next steps.     Treatment Objective(s) Addressed in This Session:   Increase interest, engagement, and pleasure in doing things  Identify negative self-talk and behaviors: challenge core beliefs, myths, and actions     Intervention:   Cognitive Behavioral Therapy (CBT) - Discussed changing thoughts is the path to changing behaviors and feelings., Motivational Interviewing - helping to find the motivation to make positive behavior changes., Person-Centered Therapy - Actualizes potential and moves towards increased awareness, spontaneity, trust in self and inner directedness. and Reality Therapy - Short term approach based on choice theory and focuses on the client assuming responsibility in the present; Through the therapeutic process, the client is able to learn more effective  "ways of meeting their needs. We always have a choice. Helps assuming personal responsibility and dealing with the present.    Assessments completed prior to visit:  The following assessments were completed by patient for this visit:  [place] Suicide Severity Rating Scale (Short Version)      1/19/2023     4:00 PM 2/1/2023     9:00 AM 2/8/2023     1:00 PM 2/21/2023    12:00 PM 7/6/2023     9:00 AM 7/13/2023     7:00 AM 7/20/2023     9:00 AM   [place] Suicide Severity Rating (Short Version)   1. Wish to be Dead (Since Last Contact) N N N N Y N N   Wish to be Dead Description (Since Last Contact)     \"I can't do this\"     2. Non-Specific Active Suicidal Thoughts (Since Last Contact) N N N N N N N   Actual Attempt (Since Last Contact)     N N N   Has subject engaged in non-suicidal self-injurious behavior? (Since Last Contact)     N N N   Interrupted Attempts (Since Last Contact)     N N N   Aborted or Self-Interrupted Attempt (Since Last Contact)     N N N   Preparatory Acts or Behavior (Since Last Contact)     N N N   Suicide (Since Last Contact)     N N N   Calculated C-SSRS Risk Score (Since Last Contact) No Risk Indicated No Risk Indicated No Risk Indicated No Risk Indicated Low Risk No Risk Indicated No Risk Indicated         ASSESSMENT: Current Emotional / Mental Status (status of significant symptoms):   Risk status (Self / Other harm or suicidal ideation)   Patient denies current fears or concerns for personal safety.   Patient denies current or recent suicidal ideation or behaviors.   Patient denies current or recent homicidal ideation or behaviors.   Patient denies current or recent self injurious behavior or ideation.   Patient denies other safety concerns.   Patient reports there has been no change in risk factors since their last session.     Patient reports there has been no change in protective factors since their last session.     A safety and risk management plan has been developed including: Patient " consented to co-developed safety plan on 7.05.2023.  Safety and risk management plan was reviewed.   Patient agreed to use safety plan should any safety concerns arise.  A copy was made available to the patient.     Appearance:   Appropriate    Eye Contact:   Good    Psychomotor Behavior: Normal    Attitude:   Cooperative    Orientation:   All   Speech    Rate / Production: Normal     Volume:  Normal    Mood:    Normal   Affect:    Appropriate    Thought Content:  Clear    Thought Form:  Coherent  Logical    Insight:    Good      Medication Review:   No changes to current psychiatric medication(s)     Medication Compliance:   Yes     Changes in Health Issues:   None reported     Chemical Use Review:   Substance Use: Chemical use reviewed, no active concerns identified      Tobacco Use: No change in amount of tobacco use since last session.  Did not discuss    Diagnoses:   Attention-Deficit/Hyperactivity Disorder  314.00 (F90.0) Predominantly inattentive presentation  300.4 (F34.1) Persistent Depressive Disorder, Early onset, With intermittent major depressive episodes, with current episodes and Moderate    Collateral Reports Completed:    Collateral: [facility] electronic medical records reviewed.    PLAN: (Patient Tasks / Therapist Tasks / Other)  -Contact three schools as discussed in session  -Continue working to identify appropriate next LOC  -Complete tx plan    Procedure Code: Psychotherapy (with patient) - 60 [CPT 45453]    NNEKA Munson, [facility]  July 20, 2023

## 2023-07-25 ENCOUNTER — MYC REFILL (OUTPATIENT)
Dept: FAMILY MEDICINE | Facility: CLINIC | Age: 38
End: 2023-07-25
Payer: COMMERCIAL

## 2023-07-25 DIAGNOSIS — F41.1 GENERALIZED ANXIETY DISORDER: ICD-10-CM

## 2023-07-25 DIAGNOSIS — F90.0 ATTENTION DEFICIT HYPERACTIVITY DISORDER, PREDOMINANTLY INATTENTIVE TYPE: ICD-10-CM

## 2023-07-25 DIAGNOSIS — F32.1 MAJOR DEPRESSIVE DISORDER, SINGLE EPISODE, MODERATE (H): ICD-10-CM

## 2023-07-25 RX ORDER — DEXTROAMPHETAMINE SACCHARATE, AMPHETAMINE ASPARTATE MONOHYDRATE, DEXTROAMPHETAMINE SULFATE AND AMPHETAMINE SULFATE 5; 5; 5; 5 MG/1; MG/1; MG/1; MG/1
20 CAPSULE, EXTENDED RELEASE ORAL DAILY
Qty: 30 CAPSULE | Refills: 0 | Status: SHIPPED | OUTPATIENT
Start: 2023-07-25 | End: 2023-08-27

## 2023-07-25 RX ORDER — DEXTROAMPHETAMINE SACCHARATE, AMPHETAMINE ASPARTATE, DEXTROAMPHETAMINE SULFATE AND AMPHETAMINE SULFATE 5; 5; 5; 5 MG/1; MG/1; MG/1; MG/1
10 TABLET ORAL DAILY PRN
Qty: 15 TABLET | Refills: 0 | Status: SHIPPED | OUTPATIENT
Start: 2023-07-25 | End: 2023-08-23

## 2023-07-25 RX ORDER — QUETIAPINE FUMARATE 50 MG/1
50 TABLET, FILM COATED ORAL AT BEDTIME
Qty: 90 TABLET | Refills: 0 | Status: SHIPPED | OUTPATIENT
Start: 2023-07-25 | End: 2023-10-17

## 2023-07-25 NOTE — TELEPHONE ENCOUNTER
"Requested Prescriptions   Pending Prescriptions Disp Refills    QUEtiapine (SEROQUEL) 50 MG tablet 90 tablet 0     Sig: Take 1 tablet (50 mg) by mouth At Bedtime       Antipsychotic Medications Failed - 7/25/2023 10:04 AM        Failed - Lipid panel on file within the past 12 months     No lab results found.            Failed - PHQ9 score less than 5 in past 6 monts     Please review last PHQ-9 score.           Passed - Blood pressure under 140/90 in past 12 months     BP Readings from Last 3 Encounters:   12/27/22 132/82   11/07/22 (!) 129/90   01/15/17 136/73                 Passed - Heart Rate on file within past 12 months     Pulse Readings from Last 3 Encounters:   12/27/22 106   11/07/22 110   01/15/17 102               Passed - A1c or Glucose on file in past 12 months     Recent Labs   Lab Test 11/07/22  2051   *       Please review patients last 3 weights. If a weight gain of >10 lbs exists, you may refill the prescription once after instructing the patient to schedule an appointment within the next 30 days.    Wt Readings from Last 3 Encounters:   12/27/22 80.3 kg (177 lb)   11/07/22 79.4 kg (175 lb)   01/07/17 70.3 kg (155 lb)             Passed - Medication is active on med list        Passed - Patient is 18 years of age or older        Passed - Recent (6 mo) or future (30 days) visit within the authorizing provider's specialty     Patient had office visit in the last 6 months or has a visit in the next 30 days with authorizing provider or within the authorizing provider's specialty.  See \"Patient Info\" tab in inbasket, or \"Choose Columns\" in Meds & Orders section of the refill encounter.              amphetamine-dextroamphetamine (ADDERALL XR) 20 MG 24 hr capsule 30 capsule 0     Sig: Take 1 capsule (20 mg) by mouth daily       There is no refill protocol information for this order       amphetamine-dextroamphetamine (ADDERALL) 20 MG tablet 15 tablet 0     Sig: Take 0.5 tablets (10 mg) by mouth " daily as needed (ADHD)       There is no refill protocol information for this order       Routing refill request to provider for review/approval because:  Drug not on the [facility] refill protocol     Carlee Velazquez RN  [facility]

## 2023-07-27 ENCOUNTER — VIRTUAL VISIT (OUTPATIENT)
Dept: BEHAVIORAL HEALTH | Facility: CLINIC | Age: 38
End: 2023-07-27
Payer: COMMERCIAL

## 2023-07-27 DIAGNOSIS — F34.1 PERSISTENT DEPRESSIVE DISORDER: Primary | ICD-10-CM

## 2023-07-27 ASSESSMENT — COLUMBIA-SUICIDE SEVERITY RATING SCALE - C-SSRS
SUICIDE, SINCE LAST CONTACT: NO
2. HAVE YOU ACTUALLY HAD ANY THOUGHTS OF KILLING YOURSELF?: NO
TOTAL  NUMBER OF ABORTED OR SELF INTERRUPTED ATTEMPTS SINCE LAST CONTACT: NO
1. SINCE LAST CONTACT, HAVE YOU WISHED YOU WERE DEAD OR WISHED YOU COULD GO TO SLEEP AND NOT WAKE UP?: NO
ATTEMPT SINCE LAST CONTACT: NO
6. HAVE YOU EVER DONE ANYTHING, STARTED TO DO ANYTHING, OR PREPARED TO DO ANYTHING TO END YOUR LIFE?: NO
TOTAL  NUMBER OF INTERRUPTED ATTEMPTS SINCE LAST CONTACT: NO

## 2023-07-27 NOTE — PROGRESS NOTES
Transition Clinic Progress Note    Patient Name:   Fuentes ANTHONY Egsgaard Date: July 27, 2023          Service Type: Individual      Session Start Time: 0734  Session End Time: 0830     Session Length:  TC Session Length: 60 (53+ Minutes)    Session #: 04    Attendees: TC Attendees: Client alone    Service Modality:  Service Modality: Video Visit:      Provider verified identity through the following two step process.  Patient provided:  Patient is known previously to provider    Telemedicine Visit: The patient's condition can be safely assessed and treated via synchronous audio and visual telemedicine encounter.      Reason for Telemedicine Visit: Patient convenience (e.g. access to timely appointments / distance to available provider)    Originating Site (Patient Location): Patient's home    Distant Site (Provider Location): [facility]    Consent:  The patient/guardian has verbally consented to: the potential risks and benefits of telemedicine (video visit) versus in person care; bill my insurance or make self-payment for services provided; and responsibility for payment of non-covered services.     Patient would like the video invitation sent by:  My Chart    Mode of Communication:  Video Conference via [facility]    Distant Location (Provider):  Off-site    As the provider I attest to compliance with applicable laws and regulations related to telemedicine.    Informed Consent and Assessment Methods    This provider and patient discussed HIPAA, and the limits of confidentiality; including mandated reporting, the possibility of collaborative discussions with patient's primary care provider and the multidisciplinary team in the MH clinic during consultation.  Discussed the no show policy, and the benefits and possible unintended consequences of therapy.  Patient indicated understanding Transition Clinic services are short term, solution focused, until a referral can be  made and patient can bridge to long term therapy.  Patient verbally indicated understanding the informed consent.        DATA  Interactive Complexity: No  Crisis: No        Progress Since Last Session (Related to Symptoms / Goals / Homework):   Symptoms: No change Pt continues to experience lack of motivation  Homework: Partially completed      Episode of Care Goals: Achieved / completed to satisfaction - CONTEMPLATION (Considering change and yet undecided); Intervened by assessing the negative and positive thinking (ambivalence) about behavior change     Current / Ongoing Stressors and Concerns:   This session pt and writer examined core belief development and pt identified how early modeling impacted the development of depression and anxiety. Pt demonstrated increased awareness of unhelpful thought and behavior development. 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       Treatment Objective(s) Addressed in This Session:   Identify factors contributing to low motivation and learned helplessness     Intervention:   Existential Therapy - Learning what it means to be human.  Goal of therapy is to expand self-awareness and increase choice potentials., Motivational Interviewing - helping to find the motivation to make positive behavior changes., and Person-Centered Therapy - Actualizes potential and moves towards increased awareness, spontaneity, trust in self and inner directedness.    Assessments completed prior to visit:  The following assessments were completed by patient for this visit:  [place] Suicide Severity Rating Scale (Short Version)      2/1/2023     9:00 AM 2/8/2023     1:00 PM 2/21/2023    12:00 PM 7/6/2023     9:00 AM 7/13/2023     7:00 AM 7/20/2023     9:00 AM 7/27/2023     9:00 AM   [place] Suicide Severity Rating (Short Version)   1. Wish to be Dead (Since Last Contact) N N N Y N N N   Wish to be Dead Description (Since Last  "Contact)    \"I can't do this\"      2. Non-Specific Active Suicidal Thoughts (Since Last Contact) N N N N N N N   Actual Attempt (Since Last Contact)    N N N N   Has subject engaged in non-suicidal self-injurious behavior? (Since Last Contact)    N N N N   Interrupted Attempts (Since Last Contact)    N N N N   Aborted or Self-Interrupted Attempt (Since Last Contact)    N N N N   Preparatory Acts or Behavior (Since Last Contact)    N N N N   Suicide (Since Last Contact)    N N N N   Calculated C-SSRS Risk Score (Since Last Contact) No Risk Indicated No Risk Indicated No Risk Indicated Low Risk No Risk Indicated No Risk Indicated No Risk Indicated         ASSESSMENT: Current Emotional / Mental Status (status of significant symptoms):   Risk status (Self / Other harm or suicidal ideation)   Patient denies current fears or concerns for personal safety.   Patient denies current or recent suicidal ideation or behaviors.   Patient denies current or recent homicidal ideation or behaviors.   Patient denies current or recent self injurious behavior or ideation.   Patient denies other safety concerns.   Patient reports there has been no change in risk factors since their last session.     Patient reports there has been no change in protective factors since their last session.     Recommended that patient call 911 or go to the local ED should there be a change in any of these risk factors.     Appearance:   Appropriate    Eye Contact:   Good    Psychomotor Behavior: Normal    Attitude:   Cooperative    Orientation:   All   Speech    Rate / Production: Normal     Volume:  Normal    Mood:    Normal   Affect:    Appropriate    Thought Content:  Clear    Thought Form:  Coherent  Logical    Insight:    Good      Medication Review:   No changes to current psychiatric medication(s)     Medication Compliance:   Yes     Changes in Health Issues:   None reported     Chemical Use Review:   Substance Use: Chemical use reviewed, no active " concerns identified      Tobacco Use: No change in amount of tobacco use since last session.  Did not discuss    Diagnoses:   300.4 (F34.1) Persistent Depressive Disorder, Early onset and Moderate    Collateral Reports Completed:   TC Collateral: [facility] electronic medical records reviewed.    PLAN: (Patient Tasks / Therapist Tasks / Other)  -Pt to take one action step towards teaching goal    Procedure Code: Psychotherapy (with patient) - 60 [CPT 71695]    ______________________________________________________________________    Treatment Plan    Patient's Name: Fuentes Zamora  YOB: 1985  Date of Creation: 7.27.2023  Date Treatment Plan Last Reviewed/Revised: 7.27.2023    DSM5 Diagnoses: 300.4 (F34.1) Persistent Depressive Disorder, Early onset and Moderate  Psychosocial / Contextual Factors: 37-year-old male from [place], [place], unemployed, single with 11-year-old son and good relationship with child's mother. MH hx of ADD, depression, anxiety all responsive to medications; no therapy hx.     PROMIS (reviewed every 90 days): PROMIS-10  In general, would you say your health is:: 5  In general, would you say your quality of life is:: 2  In general, how would you rate your physical health?: 5  In general, how would you rate your mental health, including your mood and your ability to think?: 3  In general, how would you rate your satisfaction with your social activities and relationships?: 2  In general, please rate how well you carry out your usual social activities and roles. (This includes activities at home, at work and in your community, and responsibilities as a parent, child, spouse, employee, friend, etc.): 3  To what extent are you able to carry out your everyday physical activities such as walking, climbing stairs, carrying groceries, or moving a chair?: 5  In the past 7 days, how often have you been bothered by emotional problems such as feeling anxious, depressed, or irritable?:  "5  In the past 7 days, how would you rate your fatigue on average?: 2  In the past 7 days, how would you rate your pain on average, where 0 means no pain, and 10 means worst imaginable pain?: 0    PROMIS GLOBAL SCORES  Mental health question re-calculation - no clinical value -   Mental health question re-calculation - no clinical value: 1  Physical health question re-calculation - no clinical value -   Physical health question re-calculation - no clinical value: 4  Pain question re-calculation - no clinical value -   Pain question re-calculation - no clinical value: 5  Global Mental Health Score -   Global Mental Health Score: 8  Global Physical Health Score -   Global Physical Health Score: 19  PROMIS TOTAL - SUBSCORES -   PROMIS TOTAL - SUBSCORES: 27      2650-3520 PROMIS HEALTH ORGANIZATION AND PROMIS COOPERATIVE GROUP VERSION 1.1      Referral / Collaboration:  The following referral(s) will be initiated: long-term therapy options will be identified and pursued by pt .    Anticipated number of session for this episode of care: 3-6 sessions  Anticipation frequency of session: Weekly  Anticipated Duration of each session: 38-52 minutes  Treatment plan will be reviewed in 90 days or when goals have been changed.     MeasurableTreatment Goal(s) related to diagnosis / functional impairment(s)  Goal 1: Patient will increase awareness of depression symptoms and their impact on functioning and develop skills to reduce negative impact.   I will know I've met my goal when: \"I can more easily identify triggers to depression.\"  Objective #A    Patient will describe thoughts, feelings, and actions associated with depression.    Intervention(s)   LMHP will explore and process with patient how depression has impacted them.   Status: New - Date: 7.27.2023   Objective #B   Patient will increase depression coping skills.   Intervention(s)   LMHP will teach CBT skills and model their use.   Status: New - Date: 7.27.2023     Goal " "2: Patient will identify cognitive distortions in relation to depression and explore alternatives.   I will know I've met my goal when: \"I have a better understanding of where my pain comes from.\"  Objective #A    Patient will identify thoughts and beliefs about self and the world.   Intervention(s)   LMHP will guide discussion and assist patient in identification of negative beliefs.   Status: New - Date: 7.27.2023   Objective #B   Patient will challenge negative cognitions.   Intervention(s)   LMHP will guide patient in examining the evidence for and against beliefs.   Status: New - Date: 7.27.2023      Patient has not reviewed nor agreed to the above plan.      NNEKA Munson, [facility]  July 27, 2023   "

## 2023-08-01 DIAGNOSIS — F41.1 GENERALIZED ANXIETY DISORDER: ICD-10-CM

## 2023-08-01 DIAGNOSIS — F32.1 MAJOR DEPRESSIVE DISORDER, SINGLE EPISODE, MODERATE (H): ICD-10-CM

## 2023-08-01 RX ORDER — ARIPIPRAZOLE 20 MG/1
10 TABLET ORAL DAILY
Qty: 45 TABLET | Refills: 1 | Status: SHIPPED | OUTPATIENT
Start: 2023-08-01 | End: 2023-12-30

## 2023-08-01 NOTE — TELEPHONE ENCOUNTER
Pt calling for a refill.    Pt has a virtual on 03/30/34    Carlee Velazquez RN  [facility]

## 2023-08-01 NOTE — TELEPHONE ENCOUNTER
"Requested Prescriptions   Pending Prescriptions Disp Refills    ARIPiprazole (ABILIFY) 20 MG tablet 45 tablet 1     Sig: Take 0.5 tablets (10 mg) by mouth daily       Antipsychotic Medications Failed - 8/1/2023 11:35 AM        Failed - Lipid panel on file within the past 12 months     No lab results found.            Failed - PHQ9 score less than 5 in past 6 monts     Please review last PHQ-9 score.           Passed - Blood pressure under 140/90 in past 12 months     BP Readings from Last 3 Encounters:   12/27/22 132/82   11/07/22 (!) 129/90   01/15/17 136/73                 Passed - Heart Rate on file within past 12 months     Pulse Readings from Last 3 Encounters:   12/27/22 106   11/07/22 110   01/15/17 102               Passed - A1c or Glucose on file in past 12 months     Recent Labs   Lab Test 11/07/22  2051   *       Please review patients last 3 weights. If a weight gain of >10 lbs exists, you may refill the prescription once after instructing the patient to schedule an appointment within the next 30 days.    Wt Readings from Last 3 Encounters:   12/27/22 80.3 kg (177 lb)   11/07/22 79.4 kg (175 lb)   01/07/17 70.3 kg (155 lb)             Passed - Medication is active on med list        Passed - Patient is 18 years of age or older        Passed - Recent (6 mo) or future (30 days) visit within the authorizing provider's specialty     Patient had office visit in the last 6 months or has a visit in the next 30 days with authorizing provider or within the authorizing provider's specialty.  See \"Patient Info\" tab in inbasket, or \"Choose Columns\" in Meds & Orders section of the refill encounter.              Routing refill request to provider for review/approval because medication did not pass protocol.    Carlee Velazquez RN  [facility]   "

## 2023-08-03 ENCOUNTER — VIRTUAL VISIT (OUTPATIENT)
Dept: BEHAVIORAL HEALTH | Facility: CLINIC | Age: 38
End: 2023-08-03
Payer: COMMERCIAL

## 2023-08-03 DIAGNOSIS — F41.1 GENERALIZED ANXIETY DISORDER: ICD-10-CM

## 2023-08-03 DIAGNOSIS — F90.0 ATTENTION DEFICIT HYPERACTIVITY DISORDER, PREDOMINANTLY INATTENTIVE TYPE: ICD-10-CM

## 2023-08-03 DIAGNOSIS — F34.1 PERSISTENT DEPRESSIVE DISORDER: Primary | ICD-10-CM

## 2023-08-03 ASSESSMENT — COLUMBIA-SUICIDE SEVERITY RATING SCALE - C-SSRS
SUICIDE, SINCE LAST CONTACT: NO
6. HAVE YOU EVER DONE ANYTHING, STARTED TO DO ANYTHING, OR PREPARED TO DO ANYTHING TO END YOUR LIFE?: NO
TOTAL  NUMBER OF INTERRUPTED ATTEMPTS SINCE LAST CONTACT: NO
ATTEMPT SINCE LAST CONTACT: NO
2. HAVE YOU ACTUALLY HAD ANY THOUGHTS OF KILLING YOURSELF?: NO
TOTAL  NUMBER OF ABORTED OR SELF INTERRUPTED ATTEMPTS SINCE LAST CONTACT: NO
1. SINCE LAST CONTACT, HAVE YOU WISHED YOU WERE DEAD OR WISHED YOU COULD GO TO SLEEP AND NOT WAKE UP?: NO

## 2023-08-03 NOTE — PROGRESS NOTES
Transition Clinic Progress Note    Patient Name:   Fuentes ANTHONY Egsgaard Date: August 3, 2023          Service Type: Individual      Session Start Time: 0735  Session End Time: 0835     Session Length:  TC Session Length: 60 (53+ Minutes)    Session #: 05    Attendees: TC Attendees: Client alone    Service Modality:  Service Modality: Video Visit:      Provider verified identity through the following two step process.  Patient provided:  Patient is known previously to provider    Telemedicine Visit: The patient's condition can be safely assessed and treated via synchronous audio and visual telemedicine encounter.      Reason for Telemedicine Visit: Patient convenience (e.g. access to timely appointments / distance to available provider)    Originating Site (Patient Location): Patient's home    Distant Site (Provider Location): [facility]    Consent:  The patient/guardian has verbally consented to: the potential risks and benefits of telemedicine (video visit) versus in person care; bill my insurance or make self-payment for services provided; and responsibility for payment of non-covered services.     Patient would like the video invitation sent by:  My Chart    Mode of Communication:  Video Conference via [facility]    Distant Location (Provider):  Off-site    As the provider I attest to compliance with applicable laws and regulations related to telemedicine.    Informed Consent and Assessment Methods    This provider and patient discussed HIPAA, and the limits of confidentiality; including mandated reporting, the possibility of collaborative discussions with patient's primary care provider and the multidisciplinary team in the MH clinic during consultation.  Discussed the no show policy, and the benefits and possible unintended consequences of therapy.  Patient indicated understanding Transition Clinic services are short term, solution focused, until a referral can be  made and patient can bridge to long term therapy.  Patient verbally indicated understanding the informed consent.        DATA  Interactive Complexity: No  Crisis: No        Progress Since Last Session (Related to Symptoms / Goals / Homework):   Symptoms: No change PT continues to report lack of motivation and general malaise  Homework: Did not complete      Episode of Care Goals: Satisfactory progress - CONTEMPLATION (Considering change and yet undecided); Intervened by assessing the negative and positive thinking (ambivalence) about behavior change     Current / Ongoing Stressors and Concerns:   This session pt had difficulty connecting to Tirendo and time in session was interrupted multiple times, thus running longer than usual. Pt denied any major changes between today and last session. Pt continues to endorse these sessions as helpful, though it seems he is unsure of what he hopes to accomplish in therapy. Writer encouraged pt to consider what his long-term goals are and how this clinician can assist in moving towards those goals. Pt agreed and next session was scheduled for two weeks to provide pt with time to consider goals/needs.      Treatment Objective(s) Addressed in This Session:   Identify negative self-talk and behaviors: challenge core beliefs, myths, and actions  Improve concentration, focus, and mindfulness in daily activities   Identify long-term therapy goals     Intervention:   Brief Psychotherapy - discussed and prioritizing the most efficient treatment., Existential Therapy - Learning what it means to be human.  Goal of therapy is to expand self-awareness and increase choice potentials., Motivational Interviewing - helping to find the motivation to make positive behavior changes., Person-Centered Therapy - Actualizes potential and moves towards increased awareness, spontaneity, trust in self and inner directedness., and Psychoanalysis - Helping understand interpretations or internal dialogue.  "    Assessments completed prior to visit:  The following assessments were completed by patient for this visit:  Lee Suicide Severity Rating Scale (Short Version)      2/8/2023     1:00 PM 2/21/2023    12:00 PM 7/6/2023     9:00 AM 7/13/2023     7:00 AM 7/20/2023     9:00 AM 7/27/2023     9:00 AM 8/3/2023     8:00 AM   Lee Suicide Severity Rating (Short Version)   1. Wish to be Dead (Since Last Contact) N N Y N N N N   Wish to be Dead Description (Since Last Contact)   \"I can't do this\"       2. Non-Specific Active Suicidal Thoughts (Since Last Contact) N N N N N N N   Actual Attempt (Since Last Contact)   N N N N N   Has subject engaged in non-suicidal self-injurious behavior? (Since Last Contact)   N N N N N   Interrupted Attempts (Since Last Contact)   N N N N N   Aborted or Self-Interrupted Attempt (Since Last Contact)   N N N N N   Preparatory Acts or Behavior (Since Last Contact)   N N N N N   Suicide (Since Last Contact)   N N N N N   Calculated C-SSRS Risk Score (Since Last Contact) No Risk Indicated No Risk Indicated Low Risk No Risk Indicated No Risk Indicated No Risk Indicated No Risk Indicated         ASSESSMENT: Current Emotional / Mental Status (status of significant symptoms):   Risk status (Self / Other harm or suicidal ideation)   Patient denies current fears or concerns for personal safety.   Patient denies current or recent suicidal ideation or behaviors.   Patient denies current or recent homicidal ideation or behaviors.   Patient denies current or recent self injurious behavior or ideation.   Patient denies other safety concerns.   Patient reports there has been no change in risk factors since their last session.     Patient reports there has been no change in protective factors since their last session.     Recommended that patient call 911 or go to the local ED should there be a change in any of these risk factors.     Appearance:   Disheveled    Eye Contact:   Good    Psychomotor " Behavior: Normal    Attitude:   Cooperative  Friendly   Orientation:   All   Speech    Rate / Production: Normal     Volume:  Normal    Mood:    Normal   Affect:    Appropriate    Thought Content:  Clear    Thought Form:  Coherent  Logical    Insight:    Good      Medication Review:   No changes to current psychiatric medication(s)     Medication Compliance:   Yes     Changes in Health Issues:   None reported     Chemical Use Review:   Substance Use: Chemical use reviewed, no active concerns identified      Tobacco Use: No change in amount of tobacco use since last session.  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    PLAN: (Patient Tasks / Therapist Tasks / Other)  -PT to identify MH goals as discussed in session    Procedure Code: Psychotherapy (with patient) - 60 [CPT 29424]    NNEKA Munson, [facility]  August 3, 2023

## 2023-08-23 ENCOUNTER — MYC REFILL (OUTPATIENT)
Dept: FAMILY MEDICINE | Facility: CLINIC | Age: 38
End: 2023-08-23
Payer: COMMERCIAL

## 2023-08-23 DIAGNOSIS — F90.0 ATTENTION DEFICIT HYPERACTIVITY DISORDER, PREDOMINANTLY INATTENTIVE TYPE: ICD-10-CM

## 2023-08-23 RX ORDER — DEXTROAMPHETAMINE SACCHARATE, AMPHETAMINE ASPARTATE, DEXTROAMPHETAMINE SULFATE AND AMPHETAMINE SULFATE 5; 5; 5; 5 MG/1; MG/1; MG/1; MG/1
10 TABLET ORAL DAILY PRN
Qty: 15 TABLET | Refills: 0 | Status: SHIPPED | OUTPATIENT
Start: 2023-08-23 | End: 2023-09-20

## 2023-08-23 NOTE — TELEPHONE ENCOUNTER
Requested Prescriptions   Pending Prescriptions Disp Refills    amphetamine-dextroamphetamine (ADDERALL) 20 MG tablet 15 tablet 0     Sig: Take 0.5 tablets (10 mg) by mouth daily as needed (ADHD)       There is no refill protocol information for this order         Routing refill request to provider for review/approval because:  Drug not on the [facility] refill protocol     Carlee Velazquez RN  [place]

## 2023-08-27 ENCOUNTER — MYC REFILL (OUTPATIENT)
Dept: FAMILY MEDICINE | Facility: CLINIC | Age: 38
End: 2023-08-27
Payer: COMMERCIAL

## 2023-08-27 DIAGNOSIS — F90.0 ATTENTION DEFICIT HYPERACTIVITY DISORDER, PREDOMINANTLY INATTENTIVE TYPE: ICD-10-CM

## 2023-08-27 RX ORDER — DEXTROAMPHETAMINE SACCHARATE, AMPHETAMINE ASPARTATE MONOHYDRATE, DEXTROAMPHETAMINE SULFATE AND AMPHETAMINE SULFATE 5; 5; 5; 5 MG/1; MG/1; MG/1; MG/1
20 CAPSULE, EXTENDED RELEASE ORAL DAILY
Qty: 30 CAPSULE | Refills: 0 | Status: SHIPPED | OUTPATIENT
Start: 2023-08-27 | End: 2023-09-25

## 2023-09-24 ENCOUNTER — MYC REFILL (OUTPATIENT)
Dept: FAMILY MEDICINE | Facility: CLINIC | Age: 38
End: 2023-09-24
Payer: COMMERCIAL

## 2023-09-24 DIAGNOSIS — F90.0 ATTENTION DEFICIT HYPERACTIVITY DISORDER, PREDOMINANTLY INATTENTIVE TYPE: ICD-10-CM

## 2023-09-25 ENCOUNTER — MYC MEDICAL ADVICE (OUTPATIENT)
Dept: FAMILY MEDICINE | Facility: CLINIC | Age: 38
End: 2023-09-25
Payer: COMMERCIAL

## 2023-09-25 ENCOUNTER — E-VISIT (OUTPATIENT)
Dept: FAMILY MEDICINE | Facility: CLINIC | Age: 38
End: 2023-09-25
Payer: COMMERCIAL

## 2023-09-25 ENCOUNTER — MYC REFILL (OUTPATIENT)
Dept: FAMILY MEDICINE | Facility: CLINIC | Age: 38
End: 2023-09-25
Payer: COMMERCIAL

## 2023-09-25 DIAGNOSIS — F34.1 PERSISTENT DEPRESSIVE DISORDER: Primary | ICD-10-CM

## 2023-09-25 DIAGNOSIS — F90.0 ATTENTION DEFICIT HYPERACTIVITY DISORDER, PREDOMINANTLY INATTENTIVE TYPE: ICD-10-CM

## 2023-09-25 PROCEDURE — 99207 PR NON-BILLABLE SERV PER CHARTING: CPT

## 2023-09-25 RX ORDER — DEXTROAMPHETAMINE SACCHARATE, AMPHETAMINE ASPARTATE MONOHYDRATE, DEXTROAMPHETAMINE SULFATE AND AMPHETAMINE SULFATE 5; 5; 5; 5 MG/1; MG/1; MG/1; MG/1
20 CAPSULE, EXTENDED RELEASE ORAL DAILY
Qty: 30 CAPSULE | Refills: 0 | OUTPATIENT
Start: 2023-09-25

## 2023-09-25 RX ORDER — DEXTROAMPHETAMINE SACCHARATE, AMPHETAMINE ASPARTATE MONOHYDRATE, DEXTROAMPHETAMINE SULFATE AND AMPHETAMINE SULFATE 5; 5; 5; 5 MG/1; MG/1; MG/1; MG/1
20 CAPSULE, EXTENDED RELEASE ORAL DAILY
Qty: 30 CAPSULE | Refills: 0 | Status: SHIPPED | OUTPATIENT
Start: 2023-09-25 | End: 2023-10-17

## 2023-09-25 ASSESSMENT — PATIENT HEALTH QUESTIONNAIRE - PHQ9
SUM OF ALL RESPONSES TO PHQ QUESTIONS 1-9: 9
10. IF YOU CHECKED OFF ANY PROBLEMS, HOW DIFFICULT HAVE THESE PROBLEMS MADE IT FOR YOU TO DO YOUR WORK, TAKE CARE OF THINGS AT HOME, OR GET ALONG WITH OTHER PEOPLE: SOMEWHAT DIFFICULT
SUM OF ALL RESPONSES TO PHQ QUESTIONS 1-9: 9

## 2023-09-25 ASSESSMENT — ANXIETY QUESTIONNAIRES
5. BEING SO RESTLESS THAT IT IS HARD TO SIT STILL: SEVERAL DAYS
3. WORRYING TOO MUCH ABOUT DIFFERENT THINGS: SEVERAL DAYS
7. FEELING AFRAID AS IF SOMETHING AWFUL MIGHT HAPPEN: SEVERAL DAYS
4. TROUBLE RELAXING: SEVERAL DAYS
1. FEELING NERVOUS, ANXIOUS, OR ON EDGE: SEVERAL DAYS
GAD7 TOTAL SCORE: 7
2. NOT BEING ABLE TO STOP OR CONTROL WORRYING: SEVERAL DAYS
GAD7 TOTAL SCORE: 7
6. BECOMING EASILY ANNOYED OR IRRITABLE: SEVERAL DAYS

## 2023-09-25 NOTE — TELEPHONE ENCOUNTER
Pt sent My chart message requesting a appointment with Dr. Wynn. Called pt and assisted in scheduling. Pt was scheduled with provider on 10/03/23.    Carlee Velazquez RN  [facility]

## 2023-09-25 NOTE — TELEPHONE ENCOUNTER
Requested Prescriptions   Pending Prescriptions Disp Refills    amphetamine-dextroamphetamine (ADDERALL XR) 20 MG 24 hr capsule 30 capsule 0     Sig: Take 1 capsule (20 mg) by mouth daily       There is no refill protocol information for this order       Routing refill request to provider for review/approval because:  Drug not on the [facility] refill protocol     Carlee Velazquez RN  [facility]

## 2023-09-25 NOTE — TELEPHONE ENCOUNTER
Duplicate request. Medication was filled on 9/21/23. Thanks    Carlee Velazquez RN  [facility]

## 2023-09-26 ENCOUNTER — TELEPHONE (OUTPATIENT)
Dept: BEHAVIORAL HEALTH | Facility: CLINIC | Age: 38
End: 2023-09-26
Payer: COMMERCIAL

## 2023-09-26 ASSESSMENT — ANXIETY QUESTIONNAIRES: GAD7 TOTAL SCORE: 7

## 2023-09-26 ASSESSMENT — PATIENT HEALTH QUESTIONNAIRE - PHQ9: SUM OF ALL RESPONSES TO PHQ QUESTIONS 1-9: 9

## 2023-09-26 NOTE — TELEPHONE ENCOUNTER
[facility] PHQ-9 Follow-up  Behavioral Health Clinician Triage Service    [facility] PHQ-9 Responses:      1/6/2023     1:19 AM 7/6/2023     9:00 AM 9/25/2023    10:38 AM   [facility] Follow-up to PHQ   PHQ-9 9. Suicide Ideation past 2 weeks Not at all Several days Several days   Thoughts of suicide or self harm in past 2 weeks   No   Thoughts of suicide or self harm in past 2 weeks   No   PHQ-9 Safety concerns?   No   PHQ-9 Safety concerns?   No        1st Outreach Date: September 26, 2023 Time: 852am  Outcome: Left a message for patient to call [facility].  If patient doesn't return the call the [facility] Pool will make one more phone attempt within 24 hours.    Damaris Mcfarland [facility]  She/her     2nd Outreach Date: September 26, 2023 Time: 223pm  Outcome:  [facility] called but there was no answer. [facility] left another message with a return phone number and sent a My Chart message.   Damaris Mcfarland [facility]  She/her     9/27/2023  Pt has not yet responded to either to voice mails or My Chart message. [facility] consulted with supervisor, Angely VELAZQUEZ who suggested [facility] reach out to PCP for input. [facility] sent message to PCP.  [facility] attempted to reach the pt's alternate contact however there was no answer so [facility] left a message asking them to have pt call.      9/27/2023 434PM [facility] still has not received any return phone call or response to My Chart message. It was decided in consultation with Angely VELAZQUEZ and Evie Floyd PhD that because of the hx of suicide attempt less that one year from this date a welfare check needed to be done.  [facility] called Nilesh MCKEON. [facility] received a phone call from the officer who was en route requesting more information which the [facility] provided. [facility] later received a voice mail from the officer stating that they went to the residence but no one answered the door. Because there was no just cause to enter the residence they did not. Officer stated that if more information became available to contact them again.      9/28/2023 743 AM [facility] sent PCP and Tangela Proctor [facility] a follow up message regarding this situation.     Damaris Mcfarland [facility]  She/her

## 2023-10-13 ENCOUNTER — TELEPHONE (OUTPATIENT)
Dept: FAMILY MEDICINE | Facility: CLINIC | Age: 38
End: 2023-10-13
Payer: COMMERCIAL

## 2023-10-13 DIAGNOSIS — F90.0 ATTENTION DEFICIT HYPERACTIVITY DISORDER, PREDOMINANTLY INATTENTIVE TYPE: ICD-10-CM

## 2023-10-13 RX ORDER — DEXTROAMPHETAMINE SACCHARATE, AMPHETAMINE ASPARTATE, DEXTROAMPHETAMINE SULFATE AND AMPHETAMINE SULFATE 2.5; 2.5; 2.5; 2.5 MG/1; MG/1; MG/1; MG/1
10 TABLET ORAL DAILY PRN
Qty: 30 TABLET | Refills: 0 | Status: SHIPPED | OUTPATIENT
Start: 2023-10-13 | End: 2023-11-30

## 2023-10-13 NOTE — TELEPHONE ENCOUNTER
Attempted to call pt. No answer. Will send My chart message to pt. Thanks    Carlee Velazquez RN  [facility]

## 2023-10-13 NOTE — TELEPHONE ENCOUNTER
Pt calling requesting 10 mg tablets for Adderall. Stated they have 10's in stock, but not the 20. Has been out for 2 weeks. Would like prescription to go to the same pharmacy. Thanks      Carlee Velazquez RN  [facility]

## 2023-10-13 NOTE — TELEPHONE ENCOUNTER
Please call patient to let him know I have sent these in.  I will need to see him in person to continue prescribing these medications as required by the LUIS.    Washington Wynn MD

## 2023-10-17 ENCOUNTER — VIRTUAL VISIT (OUTPATIENT)
Dept: FAMILY MEDICINE | Facility: CLINIC | Age: 38
End: 2023-10-17
Payer: COMMERCIAL

## 2023-10-17 DIAGNOSIS — F32.1 MAJOR DEPRESSIVE DISORDER, SINGLE EPISODE, MODERATE (H): ICD-10-CM

## 2023-10-17 DIAGNOSIS — F41.1 GENERALIZED ANXIETY DISORDER: ICD-10-CM

## 2023-10-17 DIAGNOSIS — F90.0 ATTENTION DEFICIT HYPERACTIVITY DISORDER, PREDOMINANTLY INATTENTIVE TYPE: ICD-10-CM

## 2023-10-17 PROCEDURE — 99213 OFFICE O/P EST LOW 20 MIN: CPT | Mod: VID | Performed by: FAMILY MEDICINE

## 2023-10-17 RX ORDER — QUETIAPINE FUMARATE 100 MG/1
100 TABLET, FILM COATED ORAL AT BEDTIME
Qty: 90 TABLET | Refills: 1 | Status: SHIPPED | OUTPATIENT
Start: 2023-10-17 | End: 2023-12-30

## 2023-10-17 RX ORDER — DEXTROAMPHETAMINE SACCHARATE, AMPHETAMINE ASPARTATE MONOHYDRATE, DEXTROAMPHETAMINE SULFATE AND AMPHETAMINE SULFATE 5; 5; 5; 5 MG/1; MG/1; MG/1; MG/1
20 CAPSULE, EXTENDED RELEASE ORAL DAILY
Qty: 30 CAPSULE | Refills: 0 | Status: SHIPPED | OUTPATIENT
Start: 2023-10-25 | End: 2023-11-30

## 2023-10-17 ASSESSMENT — ANXIETY QUESTIONNAIRES
GAD7 TOTAL SCORE: 4
GAD7 TOTAL SCORE: 4
4. TROUBLE RELAXING: SEVERAL DAYS
3. WORRYING TOO MUCH ABOUT DIFFERENT THINGS: SEVERAL DAYS
6. BECOMING EASILY ANNOYED OR IRRITABLE: NOT AT ALL
7. FEELING AFRAID AS IF SOMETHING AWFUL MIGHT HAPPEN: NOT AT ALL
1. FEELING NERVOUS, ANXIOUS, OR ON EDGE: SEVERAL DAYS
2. NOT BEING ABLE TO STOP OR CONTROL WORRYING: SEVERAL DAYS
IF YOU CHECKED OFF ANY PROBLEMS ON THIS QUESTIONNAIRE, HOW DIFFICULT HAVE THESE PROBLEMS MADE IT FOR YOU TO DO YOUR WORK, TAKE CARE OF THINGS AT HOME, OR GET ALONG WITH OTHER PEOPLE: SOMEWHAT DIFFICULT
5. BEING SO RESTLESS THAT IT IS HARD TO SIT STILL: NOT AT ALL

## 2023-10-17 NOTE — PROGRESS NOTES
Fuentes is a 38 year old who is being evaluated via a billable video visit.      How would you like to obtain your AVS? MyChart  If the video visit is dropped, the invitation should be resent by: Text to cell phone: [phone]  Will anyone else be joining your video visit? No          Assessment & Plan     Major depressive disorder, single episode, moderate (H)  Patient reports some worsening anxiety at this time.  He really has not been using the citalopram that has been ordered so today I discontinued it.  I offered to increase the quetiapine and he was open to doing that so a new prescription was sent to the pharmacy for 100 mg at bedtime.  I have suggested that an in person visit sometime before the end of the year would be appropriate particularly since he is on controlled medications for the ADHD.  He may choose to do so at the clinic in [place] because he does not have a car and that clinic is much closer to where he is currently living.  - QUEtiapine (SEROQUEL) 100 MG tablet; Take 1 tablet (100 mg) by mouth at bedtime    Generalized anxiety disorder  As above.  - QUEtiapine (SEROQUEL) 100 MG tablet; Take 1 tablet (100 mg) by mouth at bedtime    Attention deficit hyperactivity disorder, predominantly inattentive type  I refill will be due next week on the Adderall XR.   was reviewed and appropriate.  He has not yet been able to fill the 10 mg tablet that was sent in last week.  A prescription for the 20 mg capsule was sent future dated to next week.  - amphetamine-dextroamphetamine (ADDERALL XR) 20 MG 24 hr capsule; Take 1 capsule (20 mg) by mouth daily             FUTURE APPOINTMENTS:       - Follow-up visit in before the end of the year    Washington Wynn MD  [facility]    Subjective   Fuentes is a 38 year old, presenting for the following health issues:  Recheck Medication      10/17/2023    12:09 PM   Additional Questions   Roomed by Tianna       History of Present  Illness       Mental Health Follow-up:  Patient presents to follow-up on Depression & Anxiety.Patient's depression since last visit has been:  Medium  The patient is not having other symptoms associated with depression.  Patient's anxiety since last visit has been:  Medium  The patient is having other symptoms associated with anxiety.  Any significant life events: job concerns  Patient is feeling anxious or having panic attacks.  Patient has no concerns about alcohol or drug use.    He eats 2-3 servings of fruits and vegetables daily.He consumes 1 sweetened beverage(s) daily.He exercises with enough effort to increase his heart rate 30 to 60 minutes per day.  He exercises with enough effort to increase his heart rate 4 days per week.   He is taking medications regularly.         Patient is here today seen by video visit for follow-up.  He feels that his anxiety is worsened a little bit and he is having some panic attacks.  He is currently between jobs and thinks that might be contributing to it.  He is living by himself.  He has been using the citalopram only intermittently and has now been completely off of it for about a week without any symptoms of physical withdrawal.  He does continue to use the Adderall for ADHD but has at times had difficulty getting the 10 mg tablets.      Review of Systems   Constitutional, HEENT, cardiovascular, pulmonary, gi and gu systems are negative, except as otherwise noted.      Objective           Vitals:  No vitals were obtained today due to virtual visit.    Physical Exam   GENERAL: Healthy, alert and no distress  EYES: Eyes grossly normal to inspection.  No discharge or erythema, or obvious scleral/conjunctival abnormalities.  RESP: No audible wheeze, cough, or visible cyanosis.  No visible retractions or increased work of breathing.    SKIN: Visible skin clear. No significant rash, abnormal pigmentation or lesions.  NEURO: Cranial nerves grossly intact.  Mentation and speech  appropriate for age.  PSYCH: Mentation appears normal, affect normal/bright, judgement and insight intact, normal speech and appearance well-groomed.                Video-Visit Details    Type of service:  Video Visit   Video Start Time:  915 a.m.  Video End Time: 9:25 AM    Originating Location (pt. Location): Home    Distant Location (provider location):  On-site  Platform used for Video Visit: [employer]

## 2023-10-17 NOTE — COMMUNITY RESOURCES LIST (ENGLISH)
10/17/2023   [facility]  N/A  For questions about this resource list or additional care needs, please contact your primary care clinic or care manager.  Phone: [phone]   Email: N/A   Address: [address] [place] [id number]   Hours: N/A        Transportation       Free or low-cost transportation  1  [employer]  Tr Circulator Bus Distance: 5.38 miles      In-Person   [address] [place] [address]  Language: English  Hours: Tue 9:00 AM - 2:00 PM  Fees: Self Pay   Phone: (612) 145-5003 Email: info@[employer] Website: http://www.[employer].org/     2  51.com. Distance: 7.98 miles      In-Person   [address] [place] [address] [address] [place] [address]  Language: English  Hours: Mon - Fri 7:00 AM - 5:00 PM  Fees: Free   Phone: (682) 305-2066 Email: LiquidText@[employer] Website: https://Anim[employer]/     Transportation to medical appointments  3  Assisted Transport Distance: 1.39 miles      In-Person   [address] [place] [address]  Language: English, Bhutanese  Hours: Mon - Sun Appt. Only  Fees: Self Pay   Phone: (119) 873-5047 Email: christie@[employer] Website: http://www.[employer]/     4  [employer] Distance: 5.82 miles      In-Person   [address] [place] [address]  Language: Mongolian, English, Oromo, Sierra Leonean  Hours: Mon - Sat 5:00 AM - 9:00 PM  Fees: Insurance, Self Pay   Phone: (350) 141-6123 Email: info@[employer] Website: http://www.[employer]/          Important Numbers & Websites       Emergency Services   911  City Services   311  Poison Control   (600) 933-5018  Suicide Prevention Lifeline   (744) 867-3999 (TALK)  Child Abuse Hotline   (244) 949-1623 (4-A-Child)  Sexual Assault Hotline   (917) 989-1450 (HOPE)  National Runaway Safeline   (333) 675-7207 (RUNAWAY)  All-Options Talkline   (740) 381-1300  Substance Abuse Referral   (656)  421-6776 (HELP)

## 2023-11-07 ENCOUNTER — LAB (OUTPATIENT)
Dept: LAB | Facility: CLINIC | Age: 38
End: 2023-11-07
Attending: GENETIC COUNSELOR, MS
Payer: COMMERCIAL

## 2023-11-07 DIAGNOSIS — Z84.81 FAMILY HISTORY OF GENETIC DISEASE CARRIER: Primary | ICD-10-CM

## 2023-11-07 PROCEDURE — 36415 COLL VENOUS BLD VENIPUNCTURE: CPT

## 2023-11-30 ENCOUNTER — MYC REFILL (OUTPATIENT)
Dept: FAMILY MEDICINE | Facility: CLINIC | Age: 38
End: 2023-11-30
Payer: COMMERCIAL

## 2023-11-30 DIAGNOSIS — F90.0 ATTENTION DEFICIT HYPERACTIVITY DISORDER, PREDOMINANTLY INATTENTIVE TYPE: ICD-10-CM

## 2023-11-30 RX ORDER — DEXTROAMPHETAMINE SACCHARATE, AMPHETAMINE ASPARTATE, DEXTROAMPHETAMINE SULFATE AND AMPHETAMINE SULFATE 2.5; 2.5; 2.5; 2.5 MG/1; MG/1; MG/1; MG/1
10 TABLET ORAL DAILY PRN
Qty: 30 TABLET | Refills: 0 | Status: SHIPPED | OUTPATIENT
Start: 2023-11-30 | End: 2023-12-15

## 2023-11-30 RX ORDER — DEXTROAMPHETAMINE SACCHARATE, AMPHETAMINE ASPARTATE MONOHYDRATE, DEXTROAMPHETAMINE SULFATE AND AMPHETAMINE SULFATE 5; 5; 5; 5 MG/1; MG/1; MG/1; MG/1
20 CAPSULE, EXTENDED RELEASE ORAL DAILY
Qty: 30 CAPSULE | Refills: 0 | Status: SHIPPED | OUTPATIENT
Start: 2023-11-30 | End: 2023-12-30

## 2023-12-15 ENCOUNTER — MYC REFILL (OUTPATIENT)
Dept: FAMILY MEDICINE | Facility: CLINIC | Age: 38
End: 2023-12-15
Payer: COMMERCIAL

## 2023-12-15 DIAGNOSIS — F90.0 ATTENTION DEFICIT HYPERACTIVITY DISORDER, PREDOMINANTLY INATTENTIVE TYPE: ICD-10-CM

## 2023-12-17 RX ORDER — DEXTROAMPHETAMINE SACCHARATE, AMPHETAMINE ASPARTATE, DEXTROAMPHETAMINE SULFATE AND AMPHETAMINE SULFATE 5; 5; 5; 5 MG/1; MG/1; MG/1; MG/1
10 TABLET ORAL DAILY PRN
Qty: 15 TABLET | Refills: 0 | Status: SHIPPED | OUTPATIENT
Start: 2023-12-17 | End: 2024-01-11

## 2023-12-30 ENCOUNTER — MYC REFILL (OUTPATIENT)
Dept: FAMILY MEDICINE | Facility: CLINIC | Age: 38
End: 2023-12-30
Payer: COMMERCIAL

## 2023-12-30 DIAGNOSIS — F41.1 GENERALIZED ANXIETY DISORDER: ICD-10-CM

## 2023-12-30 DIAGNOSIS — F90.0 ATTENTION DEFICIT HYPERACTIVITY DISORDER, PREDOMINANTLY INATTENTIVE TYPE: ICD-10-CM

## 2023-12-30 DIAGNOSIS — F32.1 MAJOR DEPRESSIVE DISORDER, SINGLE EPISODE, MODERATE (H): ICD-10-CM

## 2024-01-01 RX ORDER — DEXTROAMPHETAMINE SACCHARATE, AMPHETAMINE ASPARTATE MONOHYDRATE, DEXTROAMPHETAMINE SULFATE AND AMPHETAMINE SULFATE 5; 5; 5; 5 MG/1; MG/1; MG/1; MG/1
20 CAPSULE, EXTENDED RELEASE ORAL DAILY
Qty: 30 CAPSULE | Refills: 0 | Status: SHIPPED | OUTPATIENT
Start: 2024-01-01 | End: 2024-02-07

## 2024-01-01 RX ORDER — ARIPIPRAZOLE 20 MG/1
10 TABLET ORAL DAILY
Qty: 45 TABLET | Refills: 1 | Status: SHIPPED | OUTPATIENT
Start: 2024-01-01 | End: 2024-02-07

## 2024-01-01 RX ORDER — QUETIAPINE FUMARATE 100 MG/1
100 TABLET, FILM COATED ORAL AT BEDTIME
Qty: 90 TABLET | Refills: 1 | Status: SHIPPED | OUTPATIENT
Start: 2024-01-01 | End: 2024-03-08

## 2024-01-11 ENCOUNTER — MYC REFILL (OUTPATIENT)
Dept: FAMILY MEDICINE | Facility: CLINIC | Age: 39
End: 2024-01-11
Payer: COMMERCIAL

## 2024-01-11 DIAGNOSIS — F90.0 ATTENTION DEFICIT HYPERACTIVITY DISORDER, PREDOMINANTLY INATTENTIVE TYPE: ICD-10-CM

## 2024-01-11 RX ORDER — DEXTROAMPHETAMINE SACCHARATE, AMPHETAMINE ASPARTATE, DEXTROAMPHETAMINE SULFATE AND AMPHETAMINE SULFATE 5; 5; 5; 5 MG/1; MG/1; MG/1; MG/1
10 TABLET ORAL DAILY PRN
Qty: 15 TABLET | Refills: 0 | Status: SHIPPED | OUTPATIENT
Start: 2024-01-15 | End: 2024-02-07

## 2024-02-07 ENCOUNTER — MYC REFILL (OUTPATIENT)
Dept: FAMILY MEDICINE | Facility: CLINIC | Age: 39
End: 2024-02-07
Payer: COMMERCIAL

## 2024-02-07 DIAGNOSIS — F90.0 ATTENTION DEFICIT HYPERACTIVITY DISORDER, PREDOMINANTLY INATTENTIVE TYPE: ICD-10-CM

## 2024-02-07 DIAGNOSIS — F32.1 MAJOR DEPRESSIVE DISORDER, SINGLE EPISODE, MODERATE (H): ICD-10-CM

## 2024-02-07 DIAGNOSIS — F41.1 GENERALIZED ANXIETY DISORDER: ICD-10-CM

## 2024-02-08 ENCOUNTER — TELEPHONE (OUTPATIENT)
Dept: FAMILY MEDICINE | Facility: CLINIC | Age: 39
End: 2024-02-08

## 2024-02-08 DIAGNOSIS — F90.0 ATTENTION DEFICIT HYPERACTIVITY DISORDER, PREDOMINANTLY INATTENTIVE TYPE: Primary | ICD-10-CM

## 2024-02-08 RX ORDER — DEXTROAMPHETAMINE SACCHARATE, AMPHETAMINE ASPARTATE, DEXTROAMPHETAMINE SULFATE AND AMPHETAMINE SULFATE 5; 5; 5; 5 MG/1; MG/1; MG/1; MG/1
10 TABLET ORAL 2 TIMES DAILY
Qty: 30 TABLET | Refills: 0 | Status: SHIPPED | OUTPATIENT
Start: 2024-02-08 | End: 2024-03-08

## 2024-02-08 RX ORDER — ARIPIPRAZOLE 20 MG/1
10 TABLET ORAL DAILY
Qty: 45 TABLET | Refills: 1 | Status: SHIPPED | OUTPATIENT
Start: 2024-02-08 | End: 2024-02-16

## 2024-02-08 RX ORDER — DEXTROAMPHETAMINE SACCHARATE, AMPHETAMINE ASPARTATE, DEXTROAMPHETAMINE SULFATE AND AMPHETAMINE SULFATE 5; 5; 5; 5 MG/1; MG/1; MG/1; MG/1
10 TABLET ORAL DAILY PRN
Qty: 15 TABLET | Refills: 0 | Status: SHIPPED | OUTPATIENT
Start: 2024-02-14 | End: 2024-10-03

## 2024-02-08 RX ORDER — DEXTROAMPHETAMINE SACCHARATE, AMPHETAMINE ASPARTATE MONOHYDRATE, DEXTROAMPHETAMINE SULFATE AND AMPHETAMINE SULFATE 5; 5; 5; 5 MG/1; MG/1; MG/1; MG/1
20 CAPSULE, EXTENDED RELEASE ORAL DAILY
Qty: 30 CAPSULE | Refills: 0 | Status: SHIPPED | OUTPATIENT
Start: 2024-02-08 | End: 2024-02-16

## 2024-02-08 NOTE — TELEPHONE ENCOUNTER
I have sent in a one time script for the 20 mg tablet to take 1/2 tablet twice daily.    Washington Wynn MD

## 2024-02-08 NOTE — TELEPHONE ENCOUNTER
Pt calling stating that he has a laps in insurance.     Pt is requesting to be able to  his Adderall 20 mg today instead of the 14th? This would only cost him $30.    The XR will cost him over two hundred dollars. Requesting to pick that one up in a couple of weeks  when he has insurance again.    Please call pt back with update. Thanks    Carlee Velazquez RN  [facility]

## 2024-02-08 NOTE — TELEPHONE ENCOUNTER
He wants to take the Adderall 20mg twice a day until he can get the other.    Please call pt back.     Carlee Velazquez RN  [facility]

## 2024-02-08 NOTE — TELEPHONE ENCOUNTER
Sent mychart msg - rx sent to pharmacy for 1 time fill     Barbi BAER RN  [facility]

## 2024-02-08 NOTE — TELEPHONE ENCOUNTER
He can  the XR anytime, he just needs to tell the pharmacy to put it on hold until he has insurance.    The 20 mg tablet is not due for a refill until next week based on directions and last date of fill.  If he has changed the way he is using it, he needs to notify us since it is a controlled medication.    Washington Wynn MD

## 2024-02-16 DIAGNOSIS — F90.0 ATTENTION DEFICIT HYPERACTIVITY DISORDER, PREDOMINANTLY INATTENTIVE TYPE: ICD-10-CM

## 2024-02-16 DIAGNOSIS — F41.1 GENERALIZED ANXIETY DISORDER: ICD-10-CM

## 2024-02-16 DIAGNOSIS — F32.1 MAJOR DEPRESSIVE DISORDER, SINGLE EPISODE, MODERATE (H): ICD-10-CM

## 2024-02-16 RX ORDER — DEXTROAMPHETAMINE SACCHARATE, AMPHETAMINE ASPARTATE MONOHYDRATE, DEXTROAMPHETAMINE SULFATE AND AMPHETAMINE SULFATE 5; 5; 5; 5 MG/1; MG/1; MG/1; MG/1
20 CAPSULE, EXTENDED RELEASE ORAL DAILY
Qty: 30 CAPSULE | Refills: 0 | Status: SHIPPED | OUTPATIENT
Start: 2024-02-16 | End: 2024-03-15

## 2024-02-16 RX ORDER — ARIPIPRAZOLE 20 MG/1
10 TABLET ORAL DAILY
Qty: 45 TABLET | Refills: 1 | Status: SHIPPED | OUTPATIENT
Start: 2024-02-16 | End: 2024-07-21

## 2024-02-16 NOTE — TELEPHONE ENCOUNTER
Pt calling to update [facility] and requesting this be high priority as needs it asap.    Thanks!  Avinash Hernandez RN   [facility]

## 2024-02-16 NOTE — TELEPHONE ENCOUNTER
Pharmacy change   Pt states [facility] will not have a pharmacist working there for the next week and unable to fill medications    Barbi BAER RN  [facility]

## 2024-03-08 ENCOUNTER — MYC REFILL (OUTPATIENT)
Dept: FAMILY MEDICINE | Facility: CLINIC | Age: 39
End: 2024-03-08
Payer: COMMERCIAL

## 2024-03-08 DIAGNOSIS — F32.1 MAJOR DEPRESSIVE DISORDER, SINGLE EPISODE, MODERATE (H): ICD-10-CM

## 2024-03-08 DIAGNOSIS — F90.0 ATTENTION DEFICIT HYPERACTIVITY DISORDER, PREDOMINANTLY INATTENTIVE TYPE: ICD-10-CM

## 2024-03-08 DIAGNOSIS — F41.1 GENERALIZED ANXIETY DISORDER: ICD-10-CM

## 2024-03-08 RX ORDER — QUETIAPINE FUMARATE 100 MG/1
100 TABLET, FILM COATED ORAL AT BEDTIME
Qty: 90 TABLET | Refills: 1 | Status: SHIPPED | OUTPATIENT
Start: 2024-03-08 | End: 2024-06-24

## 2024-03-08 RX ORDER — DEXTROAMPHETAMINE SACCHARATE, AMPHETAMINE ASPARTATE, DEXTROAMPHETAMINE SULFATE AND AMPHETAMINE SULFATE 5; 5; 5; 5 MG/1; MG/1; MG/1; MG/1
10 TABLET ORAL 2 TIMES DAILY
Qty: 30 TABLET | Refills: 0 | Status: SHIPPED | OUTPATIENT
Start: 2024-03-08 | End: 2024-04-02

## 2024-03-08 RX ORDER — DEXTROAMPHETAMINE SACCHARATE, AMPHETAMINE ASPARTATE, DEXTROAMPHETAMINE SULFATE AND AMPHETAMINE SULFATE 5; 5; 5; 5 MG/1; MG/1; MG/1; MG/1
10 TABLET ORAL DAILY PRN
Qty: 15 TABLET | Refills: 0 | Status: CANCELLED | OUTPATIENT
Start: 2024-03-08

## 2024-03-15 ENCOUNTER — MYC REFILL (OUTPATIENT)
Dept: FAMILY MEDICINE | Facility: CLINIC | Age: 39
End: 2024-03-15
Payer: COMMERCIAL

## 2024-03-15 DIAGNOSIS — F90.0 ATTENTION DEFICIT HYPERACTIVITY DISORDER, PREDOMINANTLY INATTENTIVE TYPE: ICD-10-CM

## 2024-03-15 RX ORDER — DEXTROAMPHETAMINE SACCHARATE, AMPHETAMINE ASPARTATE MONOHYDRATE, DEXTROAMPHETAMINE SULFATE AND AMPHETAMINE SULFATE 5; 5; 5; 5 MG/1; MG/1; MG/1; MG/1
20 CAPSULE, EXTENDED RELEASE ORAL DAILY
Qty: 30 CAPSULE | Refills: 0 | Status: SHIPPED | OUTPATIENT
Start: 2024-03-15 | End: 2024-04-14

## 2024-04-02 ENCOUNTER — MYC REFILL (OUTPATIENT)
Dept: FAMILY MEDICINE | Facility: CLINIC | Age: 39
End: 2024-04-02
Payer: COMMERCIAL

## 2024-04-02 DIAGNOSIS — F90.0 ATTENTION DEFICIT HYPERACTIVITY DISORDER, PREDOMINANTLY INATTENTIVE TYPE: ICD-10-CM

## 2024-04-04 RX ORDER — DEXTROAMPHETAMINE SACCHARATE, AMPHETAMINE ASPARTATE, DEXTROAMPHETAMINE SULFATE AND AMPHETAMINE SULFATE 5; 5; 5; 5 MG/1; MG/1; MG/1; MG/1
10 TABLET ORAL 2 TIMES DAILY
Qty: 30 TABLET | Refills: 0 | Status: SHIPPED | OUTPATIENT
Start: 2024-04-04 | End: 2024-05-14

## 2024-04-04 NOTE — TELEPHONE ENCOUNTER
Patient calling needing his Adderall prescription filled ASAP as he is leaving for [place] tomorrow.     Ana Luisa Hensley RN

## 2024-04-11 ENCOUNTER — PATIENT OUTREACH (OUTPATIENT)
Dept: CARE COORDINATION | Facility: CLINIC | Age: 39
End: 2024-04-11
Payer: COMMERCIAL

## 2024-04-11 NOTE — PROGRESS NOTES
Clinic Care Coordination Contact  Program:   County: [place]   Renewal: [facility]   Date Applied:      RAMAN Outreach:   4/11/24: 1st outreach attempt. Left a message on voicemail with call back information and requested return call.  Plan: CTA will call again within 2 weeks.  Katerina Mccabe  Care   [facility]  Clinic Care Coordination  [phone]      Health Insurance:        Referral/Screening:  
3

## 2024-04-14 ENCOUNTER — MYC REFILL (OUTPATIENT)
Dept: FAMILY MEDICINE | Facility: CLINIC | Age: 39
End: 2024-04-14
Payer: COMMERCIAL

## 2024-04-14 DIAGNOSIS — F90.0 ATTENTION DEFICIT HYPERACTIVITY DISORDER, PREDOMINANTLY INATTENTIVE TYPE: ICD-10-CM

## 2024-04-15 RX ORDER — DEXTROAMPHETAMINE SACCHARATE, AMPHETAMINE ASPARTATE MONOHYDRATE, DEXTROAMPHETAMINE SULFATE AND AMPHETAMINE SULFATE 5; 5; 5; 5 MG/1; MG/1; MG/1; MG/1
20 CAPSULE, EXTENDED RELEASE ORAL DAILY
Qty: 30 CAPSULE | Refills: 0 | Status: SHIPPED | OUTPATIENT
Start: 2024-04-15 | End: 2024-05-23

## 2024-04-19 ENCOUNTER — MYC REFILL (OUTPATIENT)
Dept: FAMILY MEDICINE | Facility: CLINIC | Age: 39
End: 2024-04-19
Payer: COMMERCIAL

## 2024-04-19 ENCOUNTER — MYC MEDICAL ADVICE (OUTPATIENT)
Dept: INTERNAL MEDICINE | Facility: CLINIC | Age: 39
End: 2024-04-19
Payer: COMMERCIAL

## 2024-04-19 DIAGNOSIS — F90.0 ATTENTION DEFICIT HYPERACTIVITY DISORDER, PREDOMINANTLY INATTENTIVE TYPE: ICD-10-CM

## 2024-04-19 RX ORDER — DEXTROAMPHETAMINE SACCHARATE, AMPHETAMINE ASPARTATE, DEXTROAMPHETAMINE SULFATE AND AMPHETAMINE SULFATE 5; 5; 5; 5 MG/1; MG/1; MG/1; MG/1
10 TABLET ORAL DAILY PRN
Qty: 15 TABLET | Refills: 0 | OUTPATIENT
Start: 2024-04-19

## 2024-04-19 NOTE — TELEPHONE ENCOUNTER
Fuentes calling to report the pharmacy is all out of the 20 mg Adderall XR. They do have some of the 5 mg. He is wondering if a prescription for the 5 mg could be sent over today. He is all out of the 20 mg currently. He would like this sent to [facility] off of [address].     Routing to Dr. Wynn and [place] nurses to review.     Barbi Nicole RN

## 2024-04-22 ENCOUNTER — MYC REFILL (OUTPATIENT)
Dept: FAMILY MEDICINE | Facility: CLINIC | Age: 39
End: 2024-04-22
Payer: COMMERCIAL

## 2024-04-22 DIAGNOSIS — F90.0 ATTENTION DEFICIT HYPERACTIVITY DISORDER, PREDOMINANTLY INATTENTIVE TYPE: ICD-10-CM

## 2024-04-22 RX ORDER — DEXTROAMPHETAMINE SACCHARATE, AMPHETAMINE ASPARTATE MONOHYDRATE, DEXTROAMPHETAMINE SULFATE AND AMPHETAMINE SULFATE 2.5; 2.5; 2.5; 2.5 MG/1; MG/1; MG/1; MG/1
10 CAPSULE, EXTENDED RELEASE ORAL DAILY
Qty: 30 CAPSULE | Refills: 0 | Status: SHIPPED | OUTPATIENT
Start: 2024-04-22 | End: 2024-05-22

## 2024-04-22 RX ORDER — DEXTROAMPHETAMINE SACCHARATE, AMPHETAMINE ASPARTATE, DEXTROAMPHETAMINE SULFATE AND AMPHETAMINE SULFATE 5; 5; 5; 5 MG/1; MG/1; MG/1; MG/1
10 TABLET ORAL 2 TIMES DAILY
Qty: 30 TABLET | Refills: 0 | Status: CANCELLED | OUTPATIENT
Start: 2024-04-22

## 2024-04-22 NOTE — TELEPHONE ENCOUNTER
LVM requesting call back for rx clarification    Barbi BAER RN  [facility]

## 2024-04-22 NOTE — TELEPHONE ENCOUNTER
Prescription sent to pharmacy for Adderall XR 10 mg capsules.    Please call patient to let him know.    Washington Wynn MD

## 2024-04-22 NOTE — TELEPHONE ENCOUNTER
Please call patient to clarify.  The messaging references both the Adderall XR and Adderall 20 mg tablets which are different medications.    It is unlikely he the insurance would cover the 5 mg XR capsule allowing him to take 4 daily because of cost but if that is the correct thing I can try to send that in.    Washington Wynn MD

## 2024-04-22 NOTE — TELEPHONE ENCOUNTER
Patient states pharmacy is out of Adderall XR 20mg     Requesting a new rx for Adderall 10 MG to take once daily, in between the 0.5 tablets of 20mg tablets to help bridge until he takes second 0.5 tablet later in the day.     Thank you  Barbi BAER RN  [facility]

## 2024-04-25 ENCOUNTER — PATIENT OUTREACH (OUTPATIENT)
Dept: CARE COORDINATION | Facility: CLINIC | Age: 39
End: 2024-04-25
Payer: COMMERCIAL

## 2024-04-25 NOTE — PROGRESS NOTES
Clinic Care Coordination Contact  Program:   County: [place]   Renewal: [facility]   Date Applied:      RAMAN Outreach:   4/25/24: 2nd outreach attempt. Left message on voicemail indicating last outreach attempt. CTA left [facility] number for renewal follow up.  Plan: CTA will no longer make outreach  Katerina Lombardi   [facility]  Clinic Care Coordination  [phone]    4/11/24: 1st outreach attempt. Left a message on voicemail with call back information and requested return call.  Plan: CTA will call again within 2 weeks.  Katerina Lombardi   [facility]  Clinic Care Coordination  [phone]      Health Insurance:        Referral/Screening:

## 2024-05-08 ENCOUNTER — MYC REFILL (OUTPATIENT)
Dept: FAMILY MEDICINE | Facility: CLINIC | Age: 39
End: 2024-05-08
Payer: COMMERCIAL

## 2024-05-08 DIAGNOSIS — F90.0 ATTENTION DEFICIT HYPERACTIVITY DISORDER, PREDOMINANTLY INATTENTIVE TYPE: ICD-10-CM

## 2024-05-08 RX ORDER — DEXTROAMPHETAMINE SACCHARATE, AMPHETAMINE ASPARTATE, DEXTROAMPHETAMINE SULFATE AND AMPHETAMINE SULFATE 5; 5; 5; 5 MG/1; MG/1; MG/1; MG/1
10 TABLET ORAL 2 TIMES DAILY
Qty: 30 TABLET | Refills: 0 | Status: CANCELLED | OUTPATIENT
Start: 2024-05-08

## 2024-05-14 ENCOUNTER — MYC REFILL (OUTPATIENT)
Dept: FAMILY MEDICINE | Facility: CLINIC | Age: 39
End: 2024-05-14
Payer: COMMERCIAL

## 2024-05-14 DIAGNOSIS — F90.0 ATTENTION DEFICIT HYPERACTIVITY DISORDER, PREDOMINANTLY INATTENTIVE TYPE: ICD-10-CM

## 2024-05-14 RX ORDER — DEXTROAMPHETAMINE SACCHARATE, AMPHETAMINE ASPARTATE, DEXTROAMPHETAMINE SULFATE AND AMPHETAMINE SULFATE 5; 5; 5; 5 MG/1; MG/1; MG/1; MG/1
10 TABLET ORAL 2 TIMES DAILY
Qty: 30 TABLET | Refills: 0 | Status: SHIPPED | OUTPATIENT
Start: 2024-05-14 | End: 2024-06-10

## 2024-05-23 ENCOUNTER — MYC REFILL (OUTPATIENT)
Dept: FAMILY MEDICINE | Facility: CLINIC | Age: 39
End: 2024-05-23
Payer: COMMERCIAL

## 2024-05-23 DIAGNOSIS — F90.0 ATTENTION DEFICIT HYPERACTIVITY DISORDER, PREDOMINANTLY INATTENTIVE TYPE: ICD-10-CM

## 2024-05-23 RX ORDER — DEXTROAMPHETAMINE SACCHARATE, AMPHETAMINE ASPARTATE MONOHYDRATE, DEXTROAMPHETAMINE SULFATE AND AMPHETAMINE SULFATE 5; 5; 5; 5 MG/1; MG/1; MG/1; MG/1
20 CAPSULE, EXTENDED RELEASE ORAL DAILY
Qty: 30 CAPSULE | Refills: 0 | Status: SHIPPED | OUTPATIENT
Start: 2024-05-23 | End: 2024-06-24

## 2024-05-25 ENCOUNTER — HEALTH MAINTENANCE LETTER (OUTPATIENT)
Age: 39
End: 2024-05-25

## 2024-05-28 ENCOUNTER — HOSPITAL ENCOUNTER (EMERGENCY)
Facility: CLINIC | Age: 39
Discharge: HOME | End: 2024-05-28
Attending: FAMILY MEDICINE | Admitting: FAMILY MEDICINE

## 2024-05-28 VITALS
OXYGEN SATURATION: 100 % | TEMPERATURE: 98.4 F | HEART RATE: 95 BPM | DIASTOLIC BLOOD PRESSURE: 85 MMHG | SYSTOLIC BLOOD PRESSURE: 126 MMHG | RESPIRATION RATE: 16 BRPM

## 2024-05-28 DIAGNOSIS — Z76.0 ENCOUNTER FOR MEDICATION REFILL: ICD-10-CM

## 2024-05-28 DIAGNOSIS — F90.0 ATTENTION DEFICIT HYPERACTIVITY DISORDER, PREDOMINANTLY INATTENTIVE TYPE: ICD-10-CM

## 2024-05-28 DIAGNOSIS — F34.1 PERSISTENT DEPRESSIVE DISORDER: ICD-10-CM

## 2024-05-28 PROCEDURE — 99284 EMERGENCY DEPT VISIT MOD MDM: CPT | Performed by: FAMILY MEDICINE

## 2024-05-28 RX ORDER — QUETIAPINE FUMARATE 100 MG/1
100 TABLET, FILM COATED ORAL AT BEDTIME
Qty: 30 TABLET | Refills: 0 | Status: SHIPPED | OUTPATIENT
Start: 2024-05-28

## 2024-05-28 RX ORDER — ARIPIPRAZOLE 10 MG/1
10 TABLET ORAL DAILY
Qty: 30 TABLET | Refills: 0 | Status: SHIPPED | OUTPATIENT
Start: 2024-05-28

## 2024-05-28 RX ORDER — DEXTROAMPHETAMINE SACCHARATE, AMPHETAMINE ASPARTATE MONOHYDRATE, DEXTROAMPHETAMINE SULFATE AND AMPHETAMINE SULFATE 5; 5; 5; 5 MG/1; MG/1; MG/1; MG/1
20 CAPSULE, EXTENDED RELEASE ORAL DAILY
Qty: 30 CAPSULE | Refills: 0 | Status: SHIPPED | OUTPATIENT
Start: 2024-05-28 | End: 2024-07-08

## 2024-05-28 ASSESSMENT — ACTIVITIES OF DAILY LIVING (ADL)
ADLS_ACUITY_SCORE: 35
ADLS_ACUITY_SCORE: 35

## 2024-05-28 ASSESSMENT — COLUMBIA-SUICIDE SEVERITY RATING SCALE - C-SSRS
1. IN THE PAST MONTH, HAVE YOU WISHED YOU WERE DEAD OR WISHED YOU COULD GO TO SLEEP AND NOT WAKE UP?: NO
6. HAVE YOU EVER DONE ANYTHING, STARTED TO DO ANYTHING, OR PREPARED TO DO ANYTHING TO END YOUR LIFE?: NO
2. HAVE YOU ACTUALLY HAD ANY THOUGHTS OF KILLING YOURSELF IN THE PAST MONTH?: NO

## 2024-05-28 NOTE — DISCHARGE INSTRUCTIONS
[facility] Transition [facility]  You can call our Transition Clinic at [phone]. for assistance with finding a psychiatrist and enrolling in insurance.  This outpatient service provides short-term, in-person or virtual therapy and/or virtual medication management sessions until you begin scheduled services with long-term care providers.

## 2024-05-28 NOTE — ED TRIAGE NOTES
Triage Assessment (Adult)       Row Name 05/28/24 1543          Triage Assessment    Airway WDL WDL        Respiratory WDL    Respiratory WDL WDL        Skin Circulation/Temperature WDL    Skin Circulation/Temperature WDL WDL        Cardiac WDL    Cardiac WDL WDL        Peripheral/Neurovascular WDL    Peripheral Neurovascular WDL WDL        Cognitive/Neuro/Behavioral WDL    Cognitive/Neuro/Behavioral WDL WDL

## 2024-05-28 NOTE — ED PROVIDER NOTES
[facility] [place] ([facility])    5/28/24      ED PROVIDER NOTE   Raul N  History     Chief Complaint   Patient presents with    Medication Refill     Patient came in endorsing that he has been out of his psychotropics medications for the last one month. States that he was not able to refill them because he cannot afford them. Denies pain or discomfort at the moment.     The history is provided by the patient and medical records.     Fuentes Zamora is a 39 year old male with history of major depressive disorder, generalized anxiety disorder, panic disorder, social phobia, ADHD who presents to the emergency Department with missed medications for the past month.  He states that he is unable to afford his psychiatric medications.        Past Medical History  Past Medical History:   Diagnosis Date    Anxiety     Depressive disorder     anxiety    Major depressive disorder with single episode     hospitalized for SI    Other acne      Past Surgical History:   Procedure Laterality Date    HC TOOTH EXTRACTION W/FORCEP      Under general anesthesia     amphetamine-dextroamphetamine (ADDERALL XR) 20 MG 24 hr capsule  amphetamine-dextroamphetamine (ADDERALL XR) 20 MG 24 hr capsule  amphetamine-dextroamphetamine (ADDERALL) 20 MG tablet  amphetamine-dextroamphetamine (ADDERALL) 20 MG tablet  ARIPiprazole (ABILIFY) 10 MG tablet  ARIPiprazole (ABILIFY) 20 MG tablet  QUEtiapine (SEROQUEL) 100 MG tablet  QUEtiapine (SEROQUEL) 100 MG tablet      Allergies   Allergen Reactions    No Known Allergies      Family History  Family History   Problem Relation Age of Onset    Cancer Mother         Breast cancer age 52    Depression Mother     Anxiety Disorder Mother     Substance Abuse Father     Substance Abuse Sister     Bipolar Disorder Sister     Anxiety Disorder Sister     Depression Sister      Social History   Social History     Tobacco Use    Smoking status: Every Day     Types: Vaping Device    Smokeless  tobacco: Never   Vaping Use    Vaping status: Every Day    Substances: Nicotine    Devices: Disposable, Refillable tank   Substance Use Topics    Alcohol use: Yes     Comment: Variable    Drug use: No      A medically appropriate review of systems was performed with pertinent positives and negatives noted in the HPI, and all other systems negative.    Physical Exam   BP: 126/85  Pulse: 95  Temp: 98.4  F (36.9  C)  Resp: 16  SpO2: 100 %  Physical Exam  Constitutional:       General: He is not in acute distress.     Appearance: Normal appearance. He is not toxic-appearing.   HENT:      Head: Atraumatic.   Eyes:      General: No scleral icterus.     Conjunctiva/sclera: Conjunctivae normal.   Cardiovascular:      Rate and Rhythm: Normal rate.      Heart sounds: Normal heart sounds.   Pulmonary:      Effort: Pulmonary effort is normal. No respiratory distress.      Breath sounds: Normal breath sounds.   Abdominal:      Palpations: Abdomen is soft.      Tenderness: There is no abdominal tenderness.   Musculoskeletal:         General: No deformity.      Cervical back: Neck supple.   Skin:     General: Skin is warm.   Neurological:      General: No focal deficit present.      Mental Status: He is alert and oriented to person, place, and time.      Sensory: No sensory deficit.      Motor: No weakness.      Coordination: Coordination normal.   Psychiatric:         Mood and Affect: Mood is anxious.         Speech: Speech normal.         Behavior: Behavior is cooperative.         Thought Content: Thought content is not paranoid. Thought content does not include homicidal or suicidal ideation.           ED Course, Procedures, & Data      Procedures    Medications - No data to display  Labs Ordered and Resulted from Time of ED Arrival to Time of ED Departure - No data to display  No orders to display          Critical care was not performed.     Medical Decision Making  The patient's presentation was of moderate complexity (a  chronic illness mild to moderate exacerbation, progression, or side effect of treatment).    The patient's evaluation involved:  history and exam without other MDM data elements    The patient's management necessitated moderate risk (prescription drug management including medications given in the ED).    Assessment & Plan        I have reviewed the nursing notes. I have reviewed the findings, diagnosis, plan and need for follow up with the patient.    Discharge Medication List as of 5/28/2024  4:43 PM        START taking these medications    Details   !! amphetamine-dextroamphetamine (ADDERALL XR) 20 MG 24 hr capsule Take 1 capsule (20 mg) by mouth daily, Disp-30 capsule, R-0, E-Prescribe      !! ARIPiprazole (ABILIFY) 10 MG tablet Take 1 tablet (10 mg) by mouth daily, Disp-30 tablet, R-0, E-Prescribe      !! QUEtiapine (SEROQUEL) 100 MG tablet Take 1 tablet (100 mg) by mouth at bedtime, Disp-30 tablet, R-0, E-Prescribe       !! - Potential duplicate medications found. Please discuss with provider.          Final diagnoses:   Encounter for medication refill   Persistent depressive disorder   Attention deficit hyperactivity disorder, predominantly inattentive type         [facility] EMERGENCY DEPARTMENT  5/28/2024     Homero Go MD  05/28/24 [id number]

## 2024-05-28 NOTE — ED NOTES
Discharge paperwork given, patient verbalized understanding. Took patient pharmacy and received his prescription.

## 2024-05-28 NOTE — ED NOTES
Pt is currently at the OP pharmacy with an ED RN to  meds. Pt verbalized understanding of discharge instructions.

## 2024-06-10 ENCOUNTER — MYC REFILL (OUTPATIENT)
Dept: FAMILY MEDICINE | Facility: CLINIC | Age: 39
End: 2024-06-10
Payer: COMMERCIAL

## 2024-06-10 DIAGNOSIS — F90.0 ATTENTION DEFICIT HYPERACTIVITY DISORDER, PREDOMINANTLY INATTENTIVE TYPE: ICD-10-CM

## 2024-06-10 RX ORDER — DEXTROAMPHETAMINE SACCHARATE, AMPHETAMINE ASPARTATE, DEXTROAMPHETAMINE SULFATE AND AMPHETAMINE SULFATE 5; 5; 5; 5 MG/1; MG/1; MG/1; MG/1
10 TABLET ORAL 2 TIMES DAILY
Qty: 30 TABLET | Refills: 0 | Status: SHIPPED | OUTPATIENT
Start: 2024-06-10 | End: 2024-07-09

## 2024-06-24 ENCOUNTER — MYC REFILL (OUTPATIENT)
Dept: FAMILY MEDICINE | Facility: CLINIC | Age: 39
End: 2024-06-24
Payer: COMMERCIAL

## 2024-06-24 DIAGNOSIS — F90.0 ATTENTION DEFICIT HYPERACTIVITY DISORDER, PREDOMINANTLY INATTENTIVE TYPE: ICD-10-CM

## 2024-06-24 DIAGNOSIS — F32.1 MAJOR DEPRESSIVE DISORDER, SINGLE EPISODE, MODERATE (H): ICD-10-CM

## 2024-06-24 DIAGNOSIS — F41.1 GENERALIZED ANXIETY DISORDER: ICD-10-CM

## 2024-06-24 RX ORDER — DEXTROAMPHETAMINE SACCHARATE, AMPHETAMINE ASPARTATE MONOHYDRATE, DEXTROAMPHETAMINE SULFATE AND AMPHETAMINE SULFATE 5; 5; 5; 5 MG/1; MG/1; MG/1; MG/1
20 CAPSULE, EXTENDED RELEASE ORAL DAILY
Qty: 30 CAPSULE | Refills: 0 | Status: SHIPPED | OUTPATIENT
Start: 2024-06-24

## 2024-06-24 RX ORDER — QUETIAPINE FUMARATE 100 MG/1
100 TABLET, FILM COATED ORAL AT BEDTIME
Qty: 30 TABLET | Refills: 1 | Status: SHIPPED | OUTPATIENT
Start: 2024-06-24 | End: 2024-07-21

## 2024-07-07 ENCOUNTER — MYC REFILL (OUTPATIENT)
Dept: FAMILY MEDICINE | Facility: CLINIC | Age: 39
End: 2024-07-07
Payer: COMMERCIAL

## 2024-07-07 DIAGNOSIS — F90.0 ATTENTION DEFICIT HYPERACTIVITY DISORDER, PREDOMINANTLY INATTENTIVE TYPE: ICD-10-CM

## 2024-07-08 DIAGNOSIS — F90.0 ATTENTION DEFICIT HYPERACTIVITY DISORDER, PREDOMINANTLY INATTENTIVE TYPE: Primary | ICD-10-CM

## 2024-07-08 RX ORDER — DEXTROAMPHETAMINE SACCHARATE, AMPHETAMINE ASPARTATE, DEXTROAMPHETAMINE SULFATE AND AMPHETAMINE SULFATE 5; 5; 5; 5 MG/1; MG/1; MG/1; MG/1
10 TABLET ORAL 2 TIMES DAILY
Qty: 30 TABLET | Refills: 0 | OUTPATIENT
Start: 2024-07-08

## 2024-07-08 RX ORDER — DEXTROAMPHETAMINE SACCHARATE, AMPHETAMINE ASPARTATE MONOHYDRATE, DEXTROAMPHETAMINE SULFATE AND AMPHETAMINE SULFATE 5; 5; 5; 5 MG/1; MG/1; MG/1; MG/1
20 CAPSULE, EXTENDED RELEASE ORAL DAILY
Qty: 30 CAPSULE | Refills: 0 | Status: SHIPPED | OUTPATIENT
Start: 2024-07-08 | End: 2024-07-21

## 2024-07-08 NOTE — TELEPHONE ENCOUNTER
"  Medication Question or Refill    Contacts       Contact Date/Time Type Contact Phone/Fax    07/08/2024 12:59 PM CDT Phone (Incoming) Anhkassy Fuentes GABRIELLE (Self) [phone] (H)            What medication are you calling about (include dose and sig)?:   amphetamine-dextroamphetamine (ADDERALL) 20 MG tablet   -Pt states this should be the \"instant release\" tablet not the XR    Preferred Pharmacy:   [facility] [place] [address]         Controlled Substance Agreement on file:   CSA -- Patient Level:    CSA: None found at the patient level.       Who prescribed the medication?: Washington Wynn MD    Do you need a refill? Yes  Pt just received a message stating there were standing refills on file for this medication but there are not any listed in pt chart. Perhaps clinic referenced a different med? Pt needs asap as leaving out of town tomorrow. Pt would like a call back once this has been sent to the pharmacy.     When did you use the medication last? 7/7/24 - pt is completely out now    Patient offered an appointment? No    Do you have any questions or concerns?  No    Could we send this information to you in [facility] or would you prefer to receive a phone call?:   Patient would prefer a phone call   Okay to leave a detailed message?: Yes at Cell number on file:    Telephone Information:   Mobile [phone]       "

## 2024-07-09 RX ORDER — DEXTROAMPHETAMINE SACCHARATE, AMPHETAMINE ASPARTATE, DEXTROAMPHETAMINE SULFATE AND AMPHETAMINE SULFATE 5; 5; 5; 5 MG/1; MG/1; MG/1; MG/1
10 TABLET ORAL 2 TIMES DAILY
Qty: 30 TABLET | Refills: 0 | Status: SHIPPED | OUTPATIENT
Start: 2024-07-09 | End: 2024-08-08

## 2024-07-21 ENCOUNTER — MYC REFILL (OUTPATIENT)
Dept: FAMILY MEDICINE | Facility: CLINIC | Age: 39
End: 2024-07-21
Payer: COMMERCIAL

## 2024-07-21 DIAGNOSIS — F90.0 ATTENTION DEFICIT HYPERACTIVITY DISORDER, PREDOMINANTLY INATTENTIVE TYPE: ICD-10-CM

## 2024-07-21 DIAGNOSIS — F32.1 MAJOR DEPRESSIVE DISORDER, SINGLE EPISODE, MODERATE (H): ICD-10-CM

## 2024-07-21 DIAGNOSIS — F41.1 GENERALIZED ANXIETY DISORDER: ICD-10-CM

## 2024-07-22 ENCOUNTER — MYC REFILL (OUTPATIENT)
Dept: FAMILY MEDICINE | Facility: CLINIC | Age: 39
End: 2024-07-22
Payer: COMMERCIAL

## 2024-07-22 DIAGNOSIS — F90.0 ATTENTION DEFICIT HYPERACTIVITY DISORDER, PREDOMINANTLY INATTENTIVE TYPE: ICD-10-CM

## 2024-07-22 RX ORDER — DEXTROAMPHETAMINE SACCHARATE, AMPHETAMINE ASPARTATE MONOHYDRATE, DEXTROAMPHETAMINE SULFATE AND AMPHETAMINE SULFATE 5; 5; 5; 5 MG/1; MG/1; MG/1; MG/1
20 CAPSULE, EXTENDED RELEASE ORAL DAILY
Qty: 30 CAPSULE | Refills: 0 | Status: SHIPPED | OUTPATIENT
Start: 2024-07-22 | End: 2024-07-22

## 2024-07-22 RX ORDER — QUETIAPINE FUMARATE 100 MG/1
100 TABLET, FILM COATED ORAL AT BEDTIME
Qty: 30 TABLET | Refills: 1 | Status: SHIPPED | OUTPATIENT
Start: 2024-07-22 | End: 2024-09-17

## 2024-07-22 RX ORDER — ARIPIPRAZOLE 20 MG/1
10 TABLET ORAL DAILY
Qty: 45 TABLET | Refills: 1 | Status: SHIPPED | OUTPATIENT
Start: 2024-07-22

## 2024-07-24 RX ORDER — DEXTROAMPHETAMINE SACCHARATE, AMPHETAMINE ASPARTATE MONOHYDRATE, DEXTROAMPHETAMINE SULFATE AND AMPHETAMINE SULFATE 2.5; 2.5; 2.5; 2.5 MG/1; MG/1; MG/1; MG/1
10 CAPSULE, EXTENDED RELEASE ORAL DAILY
Qty: 30 CAPSULE | Refills: 0 | Status: SHIPPED | OUTPATIENT
Start: 2024-07-24 | End: 2024-08-22

## 2024-08-08 ENCOUNTER — MYC REFILL (OUTPATIENT)
Dept: FAMILY MEDICINE | Facility: CLINIC | Age: 39
End: 2024-08-08
Payer: COMMERCIAL

## 2024-08-08 DIAGNOSIS — F90.0 ATTENTION DEFICIT HYPERACTIVITY DISORDER, PREDOMINANTLY INATTENTIVE TYPE: ICD-10-CM

## 2024-08-08 RX ORDER — DEXTROAMPHETAMINE SACCHARATE, AMPHETAMINE ASPARTATE, DEXTROAMPHETAMINE SULFATE AND AMPHETAMINE SULFATE 5; 5; 5; 5 MG/1; MG/1; MG/1; MG/1
10 TABLET ORAL 2 TIMES DAILY
Qty: 30 TABLET | Refills: 0 | Status: SHIPPED | OUTPATIENT
Start: 2024-08-08 | End: 2024-09-05

## 2024-08-14 LAB
SIGNIFICANT RESULTS: NORMAL
SPECIMEN DESCRIPTION: NORMAL

## 2024-08-19 ENCOUNTER — MYC REFILL (OUTPATIENT)
Dept: FAMILY MEDICINE | Facility: CLINIC | Age: 39
End: 2024-08-19
Payer: COMMERCIAL

## 2024-08-19 DIAGNOSIS — F32.1 MAJOR DEPRESSIVE DISORDER, SINGLE EPISODE, MODERATE (H): ICD-10-CM

## 2024-08-19 DIAGNOSIS — F90.0 ATTENTION DEFICIT HYPERACTIVITY DISORDER, PREDOMINANTLY INATTENTIVE TYPE: ICD-10-CM

## 2024-08-19 DIAGNOSIS — F41.1 GENERALIZED ANXIETY DISORDER: ICD-10-CM

## 2024-08-19 RX ORDER — DEXTROAMPHETAMINE SACCHARATE, AMPHETAMINE ASPARTATE MONOHYDRATE, DEXTROAMPHETAMINE SULFATE AND AMPHETAMINE SULFATE 5; 5; 5; 5 MG/1; MG/1; MG/1; MG/1
20 CAPSULE, EXTENDED RELEASE ORAL DAILY
Qty: 30 CAPSULE | Refills: 0 | OUTPATIENT
Start: 2024-08-19

## 2024-08-19 RX ORDER — QUETIAPINE FUMARATE 100 MG/1
100 TABLET, FILM COATED ORAL AT BEDTIME
Qty: 30 TABLET | Refills: 1 | OUTPATIENT
Start: 2024-08-19

## 2024-08-19 RX ORDER — DEXTROAMPHETAMINE SULFATE, DEXTROAMPHETAMINE SACCHARATE, AMPHETAMINE SULFATE AND AMPHETAMINE ASPARTATE 5; 5; 5; 5 MG/1; MG/1; MG/1; MG/1
20 CAPSULE, EXTENDED RELEASE ORAL DAILY
Qty: 30 CAPSULE | Refills: 0 | Status: SHIPPED | OUTPATIENT
Start: 2024-08-19 | End: 2024-09-17

## 2024-09-05 ENCOUNTER — MYC REFILL (OUTPATIENT)
Dept: FAMILY MEDICINE | Facility: CLINIC | Age: 39
End: 2024-09-05
Payer: COMMERCIAL

## 2024-09-05 DIAGNOSIS — F90.0 ATTENTION DEFICIT HYPERACTIVITY DISORDER, PREDOMINANTLY INATTENTIVE TYPE: ICD-10-CM

## 2024-09-06 RX ORDER — DEXTROAMPHETAMINE SACCHARATE, AMPHETAMINE ASPARTATE, DEXTROAMPHETAMINE SULFATE AND AMPHETAMINE SULFATE 5; 5; 5; 5 MG/1; MG/1; MG/1; MG/1
10 TABLET ORAL 2 TIMES DAILY
Qty: 30 TABLET | Refills: 0 | Status: SHIPPED | OUTPATIENT
Start: 2024-09-06 | End: 2024-10-02

## 2024-09-17 ENCOUNTER — MYC REFILL (OUTPATIENT)
Dept: FAMILY MEDICINE | Facility: CLINIC | Age: 39
End: 2024-09-17
Payer: COMMERCIAL

## 2024-09-17 DIAGNOSIS — F41.1 GENERALIZED ANXIETY DISORDER: ICD-10-CM

## 2024-09-17 DIAGNOSIS — F90.0 ATTENTION DEFICIT HYPERACTIVITY DISORDER, PREDOMINANTLY INATTENTIVE TYPE: ICD-10-CM

## 2024-09-17 DIAGNOSIS — F32.1 MAJOR DEPRESSIVE DISORDER, SINGLE EPISODE, MODERATE (H): ICD-10-CM

## 2024-09-17 RX ORDER — DEXTROAMPHETAMINE SULFATE, DEXTROAMPHETAMINE SACCHARATE, AMPHETAMINE SULFATE AND AMPHETAMINE ASPARTATE 5; 5; 5; 5 MG/1; MG/1; MG/1; MG/1
20 CAPSULE, EXTENDED RELEASE ORAL DAILY
Qty: 30 CAPSULE | Refills: 0 | Status: SHIPPED | OUTPATIENT
Start: 2024-09-17

## 2024-09-17 RX ORDER — QUETIAPINE FUMARATE 100 MG/1
100 TABLET, FILM COATED ORAL AT BEDTIME
Qty: 30 TABLET | Refills: 1 | Status: SHIPPED | OUTPATIENT
Start: 2024-09-17

## 2024-10-02 ENCOUNTER — MYC REFILL (OUTPATIENT)
Dept: FAMILY MEDICINE | Facility: CLINIC | Age: 39
End: 2024-10-02
Payer: COMMERCIAL

## 2024-10-02 DIAGNOSIS — F90.0 ATTENTION DEFICIT HYPERACTIVITY DISORDER, PREDOMINANTLY INATTENTIVE TYPE: ICD-10-CM

## 2024-10-02 RX ORDER — DEXTROAMPHETAMINE SACCHARATE, AMPHETAMINE ASPARTATE, DEXTROAMPHETAMINE SULFATE AND AMPHETAMINE SULFATE 5; 5; 5; 5 MG/1; MG/1; MG/1; MG/1
10 TABLET ORAL 2 TIMES DAILY
Qty: 30 TABLET | Refills: 0 | Status: SHIPPED | OUTPATIENT
Start: 2024-10-02 | End: 2024-10-02

## 2024-10-02 RX ORDER — DEXTROAMPHETAMINE SULFATE, DEXTROAMPHETAMINE SACCHARATE, AMPHETAMINE SULFATE AND AMPHETAMINE ASPARTATE 5; 5; 5; 5 MG/1; MG/1; MG/1; MG/1
20 CAPSULE, EXTENDED RELEASE ORAL DAILY
Qty: 30 CAPSULE | Refills: 0 | OUTPATIENT
Start: 2024-10-02

## 2024-10-02 RX ORDER — DEXTROAMPHETAMINE SACCHARATE, AMPHETAMINE ASPARTATE, DEXTROAMPHETAMINE SULFATE AND AMPHETAMINE SULFATE 2.5; 2.5; 2.5; 2.5 MG/1; MG/1; MG/1; MG/1
10 TABLET ORAL 2 TIMES DAILY
Qty: 60 TABLET | Refills: 0 | Status: SHIPPED | OUTPATIENT
Start: 2024-10-02 | End: 2024-10-03

## 2024-10-03 ENCOUNTER — TELEPHONE (OUTPATIENT)
Dept: FAMILY MEDICINE | Facility: CLINIC | Age: 39
End: 2024-10-03
Payer: COMMERCIAL

## 2024-10-03 DIAGNOSIS — F90.0 ATTENTION DEFICIT HYPERACTIVITY DISORDER, PREDOMINANTLY INATTENTIVE TYPE: ICD-10-CM

## 2024-10-03 RX ORDER — DEXTROAMPHETAMINE SACCHARATE, AMPHETAMINE ASPARTATE, DEXTROAMPHETAMINE SULFATE AND AMPHETAMINE SULFATE 5; 5; 5; 5 MG/1; MG/1; MG/1; MG/1
10 TABLET ORAL 2 TIMES DAILY
Qty: 30 TABLET | Refills: 0 | Status: SHIPPED | OUTPATIENT
Start: 2024-10-03 | End: 2024-10-06

## 2024-10-03 NOTE — TELEPHONE ENCOUNTER
Prior Authorization Retail Medication Request    Medication/Dose: Amphetamine Salts 10 mg tablet   Diagnosis and ICD code (if different than what is on RX):  F90  New/renewal/insurance change PA/secondary ins. PA:  Previously Tried and Failed:    Rationale:  Insurance limits to one tablet daily. We usually do one-half 20 mg tablet twice daily but 20 mg tablets are on backorder.    Insurance   [facility]: [facility]   Insurance ID:  [id number]    Secondary (if applicable):  Insurance ID:      Pharmacy Information (if different than what is on RX)  Name:  [facility]   Phone:  [phone]   Fax: [phone]    Clinic Information  Preferred routing pool for dept communication:     Thank you    Jeff Rm, PharmD  [facility] Outpatient Pharmacy Manager   [address] [place] [id number]  [email or web address]  [phone]

## 2024-10-03 NOTE — TELEPHONE ENCOUNTER
Retail Pharmacy Prior Authorization Team   Phone: [phone]      [facility] will not cover medication at twice daily.  Per their plan information, they will only cover one-half tablet (to equal 5 mg) of the IR 10 mg.    They do cover Amphetamine-Dextroamphetamine IR 20 mg for up to 3 tabs/day.    Is the provider okay changing to the IR 20 mg with the patient taking one-half tablet (to equal the 10 mg) twice daily instead?    HH usually will deny any PA request as they will need to know exactly why the patient cannot use the plan preferred product that is within the plan limits.

## 2024-10-03 NOTE — TELEPHONE ENCOUNTER
Script sent again for 20 mg tablet, 1/2 tablet (10 mg) twice daily.  Had switched to 10 mg tabs due to supply issues.    Washington Wynn MD

## 2024-10-06 ENCOUNTER — MYC REFILL (OUTPATIENT)
Dept: FAMILY MEDICINE | Facility: CLINIC | Age: 39
End: 2024-10-06
Payer: COMMERCIAL

## 2024-10-06 DIAGNOSIS — F90.0 ATTENTION DEFICIT HYPERACTIVITY DISORDER, PREDOMINANTLY INATTENTIVE TYPE: ICD-10-CM

## 2024-10-07 RX ORDER — DEXTROAMPHETAMINE SACCHARATE, AMPHETAMINE ASPARTATE, DEXTROAMPHETAMINE SULFATE AND AMPHETAMINE SULFATE 5; 5; 5; 5 MG/1; MG/1; MG/1; MG/1
10 TABLET ORAL 2 TIMES DAILY
Qty: 30 TABLET | Refills: 0 | Status: SHIPPED | OUTPATIENT
Start: 2024-10-07

## 2024-10-11 ENCOUNTER — MYC REFILL (OUTPATIENT)
Dept: FAMILY MEDICINE | Facility: CLINIC | Age: 39
End: 2024-10-11
Payer: COMMERCIAL

## 2024-10-11 DIAGNOSIS — F90.0 ATTENTION DEFICIT HYPERACTIVITY DISORDER, PREDOMINANTLY INATTENTIVE TYPE: ICD-10-CM

## 2024-10-11 RX ORDER — DEXTROAMPHETAMINE SULFATE, DEXTROAMPHETAMINE SACCHARATE, AMPHETAMINE SULFATE AND AMPHETAMINE ASPARTATE 5; 5; 5; 5 MG/1; MG/1; MG/1; MG/1
20 CAPSULE, EXTENDED RELEASE ORAL DAILY
Qty: 30 CAPSULE | Refills: 0 | Status: SHIPPED | OUTPATIENT
Start: 2024-10-15 | End: 2024-11-11

## 2024-10-21 ENCOUNTER — OFFICE VISIT (OUTPATIENT)
Dept: FAMILY MEDICINE | Facility: CLINIC | Age: 39
End: 2024-10-21
Payer: COMMERCIAL

## 2024-10-21 VITALS
BODY MASS INDEX: 25.26 KG/M2 | TEMPERATURE: 97.8 F | RESPIRATION RATE: 16 BRPM | WEIGHT: 186.5 LBS | OXYGEN SATURATION: 99 % | SYSTOLIC BLOOD PRESSURE: 113 MMHG | HEIGHT: 72 IN | HEART RATE: 118 BPM | DIASTOLIC BLOOD PRESSURE: 82 MMHG

## 2024-10-21 DIAGNOSIS — F90.0 ATTENTION DEFICIT HYPERACTIVITY DISORDER, PREDOMINANTLY INATTENTIVE TYPE: ICD-10-CM

## 2024-10-21 DIAGNOSIS — Z23 NEEDS FLU SHOT: ICD-10-CM

## 2024-10-21 DIAGNOSIS — Z11.4 SCREENING FOR HIV (HUMAN IMMUNODEFICIENCY VIRUS): ICD-10-CM

## 2024-10-21 DIAGNOSIS — M54.9 UPPER BACK PAIN: ICD-10-CM

## 2024-10-21 DIAGNOSIS — Z11.59 NEED FOR HEPATITIS C SCREENING TEST: ICD-10-CM

## 2024-10-21 DIAGNOSIS — F32.1 MAJOR DEPRESSIVE DISORDER, SINGLE EPISODE, MODERATE (H): ICD-10-CM

## 2024-10-21 DIAGNOSIS — Z23 NEED FOR COVID-19 VACCINE: ICD-10-CM

## 2024-10-21 DIAGNOSIS — F41.0 PANIC DISORDER WITHOUT AGORAPHOBIA: ICD-10-CM

## 2024-10-21 DIAGNOSIS — Z00.00 ROUTINE GENERAL MEDICAL EXAMINATION AT A HEALTH CARE FACILITY: Primary | ICD-10-CM

## 2024-10-21 DIAGNOSIS — Z11.59 NEED FOR HEPATITIS B SCREENING TEST: ICD-10-CM

## 2024-10-21 LAB
CHOLEST SERPL-MCNC: 164 MG/DL
FASTING STATUS PATIENT QL REPORTED: NO
FASTING STATUS PATIENT QL REPORTED: NO
GLUCOSE SERPL-MCNC: 105 MG/DL (ref 70–99)
HBV SURFACE AB SERPL IA-ACNC: 216 M[IU]/ML
HBV SURFACE AB SERPL IA-ACNC: REACTIVE M[IU]/ML
HCV AB SERPL QL IA: NONREACTIVE
HDLC SERPL-MCNC: 56 MG/DL
HIV 1+2 AB+HIV1 P24 AG SERPL QL IA: NONREACTIVE
LDLC SERPL CALC-MCNC: 91 MG/DL
NONHDLC SERPL-MCNC: 108 MG/DL
TRIGL SERPL-MCNC: 87 MG/DL

## 2024-10-21 PROCEDURE — 90656 IIV3 VACC NO PRSV 0.5 ML IM: CPT | Performed by: FAMILY MEDICINE

## 2024-10-21 PROCEDURE — 99214 OFFICE O/P EST MOD 30 MIN: CPT | Mod: 25 | Performed by: FAMILY MEDICINE

## 2024-10-21 PROCEDURE — 86803 HEPATITIS C AB TEST: CPT | Performed by: FAMILY MEDICINE

## 2024-10-21 PROCEDURE — 82947 ASSAY GLUCOSE BLOOD QUANT: CPT | Performed by: FAMILY MEDICINE

## 2024-10-21 PROCEDURE — 36415 COLL VENOUS BLD VENIPUNCTURE: CPT | Performed by: FAMILY MEDICINE

## 2024-10-21 PROCEDURE — 91320 SARSCV2 VAC 30MCG TRS-SUC IM: CPT | Performed by: FAMILY MEDICINE

## 2024-10-21 PROCEDURE — 90480 ADMN SARSCOV2 VAC 1/ONLY CMP: CPT | Performed by: FAMILY MEDICINE

## 2024-10-21 PROCEDURE — 86706 HEP B SURFACE ANTIBODY: CPT | Performed by: FAMILY MEDICINE

## 2024-10-21 PROCEDURE — 80061 LIPID PANEL: CPT | Performed by: FAMILY MEDICINE

## 2024-10-21 PROCEDURE — 99396 PREV VISIT EST AGE 40-64: CPT | Mod: 25 | Performed by: FAMILY MEDICINE

## 2024-10-21 PROCEDURE — 90471 IMMUNIZATION ADMIN: CPT | Performed by: FAMILY MEDICINE

## 2024-10-21 PROCEDURE — 87389 HIV-1 AG W/HIV-1&-2 AB AG IA: CPT | Performed by: FAMILY MEDICINE

## 2024-10-21 RX ORDER — DEXTROAMPHETAMINE SACCHARATE, AMPHETAMINE ASPARTATE MONOHYDRATE, DEXTROAMPHETAMINE SULFATE AND AMPHETAMINE SULFATE 2.5; 2.5; 2.5; 2.5 MG/1; MG/1; MG/1; MG/1
10 CAPSULE, EXTENDED RELEASE ORAL DAILY PRN
Qty: 30 CAPSULE | Refills: 0 | Status: SHIPPED | OUTPATIENT
Start: 2024-10-21 | End: 2024-10-23

## 2024-10-21 ASSESSMENT — ANXIETY QUESTIONNAIRES
3. WORRYING TOO MUCH ABOUT DIFFERENT THINGS: SEVERAL DAYS
GAD7 TOTAL SCORE: 5
2. NOT BEING ABLE TO STOP OR CONTROL WORRYING: SEVERAL DAYS
7. FEELING AFRAID AS IF SOMETHING AWFUL MIGHT HAPPEN: NOT AT ALL
4. TROUBLE RELAXING: SEVERAL DAYS
5. BEING SO RESTLESS THAT IT IS HARD TO SIT STILL: SEVERAL DAYS
GAD7 TOTAL SCORE: 5
6. BECOMING EASILY ANNOYED OR IRRITABLE: NOT AT ALL
7. FEELING AFRAID AS IF SOMETHING AWFUL MIGHT HAPPEN: NOT AT ALL
8. IF YOU CHECKED OFF ANY PROBLEMS, HOW DIFFICULT HAVE THESE MADE IT FOR YOU TO DO YOUR WORK, TAKE CARE OF THINGS AT HOME, OR GET ALONG WITH OTHER PEOPLE?: SOMEWHAT DIFFICULT
IF YOU CHECKED OFF ANY PROBLEMS ON THIS QUESTIONNAIRE, HOW DIFFICULT HAVE THESE PROBLEMS MADE IT FOR YOU TO DO YOUR WORK, TAKE CARE OF THINGS AT HOME, OR GET ALONG WITH OTHER PEOPLE: SOMEWHAT DIFFICULT
GAD7 TOTAL SCORE: 5
1. FEELING NERVOUS, ANXIOUS, OR ON EDGE: SEVERAL DAYS

## 2024-10-21 ASSESSMENT — PATIENT HEALTH QUESTIONNAIRE - PHQ9
10. IF YOU CHECKED OFF ANY PROBLEMS, HOW DIFFICULT HAVE THESE PROBLEMS MADE IT FOR YOU TO DO YOUR WORK, TAKE CARE OF THINGS AT HOME, OR GET ALONG WITH OTHER PEOPLE: SOMEWHAT DIFFICULT
SUM OF ALL RESPONSES TO PHQ QUESTIONS 1-9: 6
SUM OF ALL RESPONSES TO PHQ QUESTIONS 1-9: 6

## 2024-10-21 ASSESSMENT — ENCOUNTER SYMPTOMS
NERVOUS/ANXIOUS: 1
BACK PAIN: 1

## 2024-10-21 ASSESSMENT — PAIN SCALES - GENERAL: PAINLEVEL: SEVERE PAIN (6)

## 2024-10-21 NOTE — PATIENT INSTRUCTIONS
Patient Education   Preventive Care Advice   This is general advice given by our system to help you stay healthy. However, your care team may have specific advice just for you. Please talk to your care team about your preventive care needs.  Nutrition  Eat 5 or more servings of fruits and vegetables each day.  Try wheat bread, brown rice and whole grain pasta (instead of white bread, rice, and pasta).  Get enough calcium and vitamin D. Check the label on foods and aim for 100% of the RDA (recommended daily allowance).  Lifestyle  Exercise at least 150 minutes each week  (30 minutes a day, 5 days a week).  Do muscle strengthening activities 2 days a week. These help control your weight and prevent disease.  No smoking.  Wear sunscreen to prevent skin cancer.  Have a dental exam and cleaning every 6 months.  Yearly exams  See your health care team every year to talk about:  Any changes in your health.  Any medicines your care team has prescribed.  Preventive care, family planning, and ways to prevent chronic diseases.  Shots (vaccines)   HPV shots (up to age 26), if you've never had them before.  Hepatitis B shots (up to age 59), if you've never had them before.  COVID-19 shot: Get this shot when it's due.  Flu shot: Get a flu shot every year.  Tetanus shot: Get a tetanus shot every 10 years.  Pneumococcal, hepatitis A, and RSV shots: Ask your care team if you need these based on your risk.  Shingles shot (for age 50 and up)  General health tests  Diabetes screening:  Starting at age 35, Get screened for diabetes at least every 3 years.  If you are younger than age 35, ask your care team if you should be screened for diabetes.  Cholesterol test: At age 39, start having a cholesterol test every 5 years, or more often if advised.  Bone density scan (DEXA): At age 50, ask your care team if you should have this scan for osteoporosis (brittle bones).  Hepatitis C: Get tested at least once in your life.  STIs (sexually  transmitted infections)  Before age 24: Ask your care team if you should be screened for STIs.  After age 24: Get screened for STIs if you're at risk. You are at risk for STIs (including HIV) if:  You are sexually active with more than one person.  You don't use condoms every time.  You or a partner was diagnosed with a sexually transmitted infection.  If you are at risk for HIV, ask about PrEP medicine to prevent HIV.  Get tested for HIV at least once in your life, whether you are at risk for HIV or not.  Cancer screening tests  Cervical cancer screening: If you have a cervix, begin getting regular cervical cancer screening tests starting at age 21.  Breast cancer scan (mammogram): If you've ever had breasts, begin having regular mammograms starting at age 40. This is a scan to check for breast cancer.  Colon cancer screening: It is important to start screening for colon cancer at age 45.  Have a colonoscopy test every 10 years (or more often if you're at risk) Or, ask your provider about stool tests like a FIT test every year or Cologuard test every 3 years.  To learn more about your testing options, visit:   .  For help making a decision, visit:   https://bit.ly/tb89850.  Prostate cancer screening test: If you have a prostate, ask your care team if a prostate cancer screening test (PSA) at age 55 is right for you.  Lung cancer screening: If you are a current or former smoker ages 50 to 80, ask your care team if ongoing lung cancer screenings are right for you.  For informational purposes only. Not to replace the advice of your health care provider. Copyright   2023 [facility]. All rights reserved. Clinically reviewed by the [facility]. [employer] 661905 - REV 01/24.  Learning About Depression Screening  What is depression screening?  Depression screening is a way to see if you have depression symptoms. It may be done by a doctor or counselor. It's often part of a routine  "checkup. That's because your mental health is just as important as your physical health.  Depression is a mental health condition that affects how you feel, think, and act. You may:  Have less energy.  Lose interest in your daily activities.  Feel sad and grouchy for a long time.  Depression is very common. It affects people of all ages.  Many things can lead to depression. Some people become depressed after they have a stroke or find out they have a major illness like cancer or heart disease. The death of a loved one or a breakup may lead to depression. It can run in families. Most experts believe that a combination of inherited genes and stressful life events can cause it.  What happens during screening?  You may be asked to fill out a form about your depression symptoms. You and the doctor will discuss your answers. The doctor may ask you more questions to learn more about how you think, act, and feel.  What happens after screening?  If you have symptoms of depression, your doctor will talk to you about your options.  Doctors usually treat depression with medicines or counseling. Often, combining the two works best. Many people don't get help because they think that they'll get over the depression on their own. But people with depression may not get better unless they get treatment.  The cause of depression is not well understood. There may be many factors involved. But if you have depression, it's not your fault.  A serious symptom of depression is thinking about death or suicide. If you or someone you care about talks about this or about feeling hopeless, get help right away.  It's important to know that depression can be treated. Medicine, counseling, and self-care may help.  Where can you learn more?  Go to https://www.[employer].net/patiented  Enter T185 in the search box to learn more about \"Learning About Depression Screening.\"  Current as of: June 24, 2023  Content Version: 14.2 2024 [employer].   Care instructions adapted under license by your healthcare professional. If you have questions about a medical condition or this instruction, always ask your healthcare professional. [employer] disclaims any warranty or liability for your use of this information.

## 2024-10-21 NOTE — PROGRESS NOTES
Assessment & Plan     Routine general medical examination at a health care facility  Routine health issues appropriate for age reviewed.  Follow-up is recommended in 1 year for routine physical.  Lipid panel and glucose were done today.  - Lipid panel reflex to direct LDL Non-fasting; Future  - REVIEW OF HEALTH MAINTENANCE PROTOCOL ORDERS  - Glucose; Future  - Lipid panel reflex to direct LDL Non-fasting  - Glucose    Screening for HIV (human immunodeficiency virus)  One-time screening for HIV was discussed and done today.  - HIV Antigen Antibody Combo; Future  - HIV Antigen Antibody Combo    Need for hepatitis C screening test  One-time screening for hepatitis C was discussed and done today.  - Hepatitis C Screen Reflex to HCV RNA Quant and Genotype; Future  - Hepatitis C Screen Reflex to HCV RNA Quant and Genotype    Needs flu shot  Flu shot was provided.  - INFLUENZA VACCINE,SPLIT VIRUS,TRIVALENT,PF(FLUZONE)    Need for COVID-19 vaccine  COVID-vaccine was provided.  - COVID-19 12+ (PFIZER)    Attention deficit hyperactivity disorder, predominantly inattentive type  Patient has noticed difficulty with focus at certain times and we talked about the addition of as needed dose of the Adderall XR so prescription was issued today for a 10 mg capsule to use once daily as needed.  No other changes were made to current medications which were verified today.  - amphetamine-dextroamphetamine (ADDERALL XR) 10 MG 24 hr capsule; Take 1 capsule (10 mg) by mouth daily as needed (increased need for focus).    Major depressive disorder, single episode, moderate (H)  Mood is stable at this time per patient report.  Continue on current medications without changes.    Panic disorder without agoraphobia  Stable at this time per patient report.    Upper back pain  He has a little bit of an exaggerated thoracic kyphosis on exam today and I suspect this is probably playing a role in some of the back pain.  There are no alarming factors  so I have recommended follow-up in symptoms are worsening over time.    Need for hepatitis B screening test  Will screen for evidence of hepatitis B immunity to indicate whether or not he needs vaccination.  - Hepatitis B Surface Antibody; Future  - Hepatitis B Surface Antibody          Nicotine/Tobacco Cessation  He reports that he has been smoking vaping device. He has never used smokeless tobacco.  Nicotine/Tobacco Cessation Plan  Information offered: Patient not interested at this time      BMI  Estimated body mass index is 25.29 kg/m  as calculated from the following:    Height as of this encounter: 1.829 m (6').    Weight as of this encounter: 84.6 kg (186 lb 8 oz).         FUTURE APPOINTMENTS:       - Follow-up visit in 6 months for ADHD and mood recheck       - Follow-up for annual visit or as needed    Subjective   Fuentes is a 39 year old, presenting for the following health issues:  RECHECK, Back Pain, Depression, and Anxiety        10/21/2024     9:48 AM   Additional Questions   Roomed by Patrick   Accompanied by Self     History of Present Illness       Back Pain:  He presents for follow up of back pain. Patient's back pain is a new problem.    Original cause of back pain: lifting  First noticed back pain: 1-4 weeks ago  Patient feels back pain: dailyLocation of back pain:  Right middle of back  Description of back pain: gnawing  Back pain spreads: right side of neck    Since patient first noticed back pain, pain is: always present, but gets better and worse  Does back pain interfere with his job:  No  On a scale of 1-10 (10 being the worst), patient describes pain as:  7  What makes back pain worse: bending and lying down   Acupuncture: not tried  Acetaminophen: not helpful  Activity or exercise: not helpful  Chiropractor:  Not tried  Cold: not tried  Heat: not helpful  Massage: not tried  Muscle relaxants: not tried  NSAIDS: not helpful  Opioids: not tried  Physical Therapy: not tried  Rest: not  helpful  Steroid Injection: not tried  Stretching: not helpful  Surgery: not helpful  TENS unit: not tried  Topical pain relievers: not helpful  Other healthcare providers patient is seeing for back pain: None    Mental Health Follow-up:  Patient presents to follow-up on Depression & Anxiety.Patient's depression since last visit has been:  Medium  The patient is not having other symptoms associated with depression.  Patient's anxiety since last visit has been:  Better  The patient is not having other symptoms associated with anxiety.  Any significant life events: No  Patient is not feeling anxious or having panic attacks.  Patient has no concerns about alcohol or drug use.    He eats 2-3 servings of fruits and vegetables daily.He consumes 1 sweetened beverage(s) daily.He exercises with enough effort to increase his heart rate 20 to 29 minutes per day.  He exercises with enough effort to increase his heart rate 4 days per week.   He is taking medications regularly.         Annual Wellness Visit     Patient has been advised of split billing requirements and indicates understanding: Yes         Health Care Directive  Patient does not have a Health Care Directive or Living Will: Discussed advance care planning with patient; however, patient declined at this time.  In general, how would you rate your overall physical health? (!) FAIR   Discussed with patient their rating of physical health; information has been provided.   Do you have a special diet?  Regular (no restrictions)        12/28/2022   Exercise, Social Connection, Stress   Days per week of moderate/strenous exercise 0 days   Average minutes spent exercising at this level 0 min   Frequency of gathering with friends or relatives Twice   Feel stress (tense, anxious, or unable to sleep) To some exte        Do you see a dentist two times every year?  (!) NO  The patient was instructed to see the dentist every 6 months.   Have you been more tired than usual lately?   No  If you drink alcohol do you typically have >3 drinks per day or >7 drinks per week? No  Do you have a current opioid prescription? No  Do you use any other controlled substances or medications that are not prescribed by a provider? None  Social History     Tobacco Use    Smoking status: Every Day     Types: Vaping Device    Smokeless tobacco: Never   Vaping Use    Vaping status: Every Day    Substances: Nicotine    Devices: Disposable, Refillable tank   Substance Use Topics    Alcohol use: Not Currently     Comment: Variable    Drug use: No       Needs assistance for the following daily activities: no assistance needed  Which of the following safety concerns are present in your home?  none identified   Do you (or your family members) have any concerns about your safety while driving?  No  Do you have any of the following hearing concerns?: No hearing concerns  In the past 6 months, have you been bothered by leaking of urine? No        10/17/2023   Social Factors   Worry food won't last until get money to buy more No   Food not last or not have enough money for food? No   Do you have housing? (Housing is defined as stable permanent housing and does not include staying ouside in a car, in a tent, in an abandoned building, in an overnight shelter, or couch-surfing.) Yes   Are you worried about losing your housing? No   Lack of transportation? Yes   Unable to get utilities (heat,electricity)? No          Today's PHQ-9 Score:       10/21/2024     9:39 AM   PHQ-9 SCORE   PHQ-9 Total Score MyChart 6 (Mild depression)   PHQ-9 Total Score 6     ASCVD Risk   The ASCVD Risk score (Shen ROBERTO, et al., 2019) failed to calculate for the following reasons:    Cannot find a previous HDL lab    Cannot find a previous total cholesterol lab         No data to display                 Reviewed and updated as needed this visit by Provider   Tobacco  Allergies  Meds  Problems  Med Hx  Surg Hx  Fam Hx  Soc   Hx Sexual  Activity          Patient Active Problem List   Diagnosis    Other acne    Social phobia    Panic disorder without agoraphobia    Major depressive disorder, single episode, moderate (H)    Generalized anxiety disorder    Depression    Attention deficit hyperactivity disorder, predominantly inattentive type    Displacement of lumbar intervertebral disc without myelopathy     Past Surgical History:   Procedure Laterality Date    HC TOOTH EXTRACTION W/FORCEP      Under general anesthesia       Social History     Tobacco Use    Smoking status: Every Day     Types: Vaping Device    Smokeless tobacco: Never   Substance Use Topics    Alcohol use: Not Currently     Comment: Variable     Family History   Problem Relation Age of Onset    Cancer Mother         Breast cancer age 52    Depression Mother     Anxiety Disorder Mother     Substance Abuse Father     Substance Abuse Sister     Bipolar Disorder Sister     Anxiety Disorder Sister     Depression Sister     Thrombocytopenia Son         2 month hospital stay, now off meds         Current Outpatient Medications   Medication Sig Dispense Refill    ADDERALL XR 20 MG 24 hr capsule Take 1 capsule (20 mg) by mouth daily. 30 capsule 0    amphetamine-dextroamphetamine (ADDERALL XR) 10 MG 24 hr capsule Take 1 capsule (10 mg) by mouth daily as needed (increased need for focus). 30 capsule 0    amphetamine-dextroamphetamine (ADDERALL) 20 MG tablet Take 0.5 tablets (10 mg) by mouth 2 times daily. 30 tablet 0    ARIPiprazole (ABILIFY) 20 MG tablet Take 0.5 tablets (10 mg) by mouth daily 45 tablet 1    QUEtiapine (SEROQUEL) 100 MG tablet Take 1 tablet (100 mg) by mouth at bedtime. 30 tablet 1     No Known Allergies    Current providers sharing in care for this patient include:  Patient Care Team:  [facility] (Inactive) as PCP - General  Washington Wynn MD as Assigned PCP    The following health maintenance items are reviewed in Epic and correct as of  today:  Health Maintenance   Topic Date Due    DEPRESSION ACTION PLAN  Never done    Pneumococcal Vaccine: Pediatrics (0 to 5 Years) and At-Risk Patients (6 to 64 Years) (1 of 2 - PCV) Never done    HIV SCREENING  Never done    HEPATITIS C SCREENING  Never done    HEPATITIS B IMMUNIZATION (1 of 3 - 19+ 3-dose series) Never done    LIPID  01/13/2023    NICOTINE/TOBACCO CESSATION COUNSELING Q 1 YR  12/27/2023    PHQ-9  04/21/2025    YEARLY PREVENTIVE VISIT  10/21/2025    ANNUAL REVIEW OF HM ORDERS  10/21/2025    GLUCOSE  11/07/2025    DTAP/TDAP/TD IMMUNIZATION (5 - Td or Tdap) 07/10/2027    ADVANCE CARE PLANNING  10/21/2029    RSV VACCINE (1 - 1-dose 75+ series) 01/13/2058    INFLUENZA VACCINE  Completed    COVID-19 Vaccine  Completed    HPV IMMUNIZATION  Aged Out    MENINGITIS IMMUNIZATION  Aged Out    RSV MONOCLONAL ANTIBODY  Aged Out       Appropriate preventive services were discussed with this patient, including applicable screening as appropriate for fall prevention, nutrition, physical activity, Tobacco-use cessation, weight loss and cognition.  Checklist reviewing preventive services available has been given to the patient.              Review of Systems  Constitutional, HEENT, cardiovascular, pulmonary, GI, , musculoskeletal, neuro, skin, endocrine and psych systems are negative, except as otherwise noted.      Objective    /82   Pulse 118   Temp 97.8  F (36.6  C) (Temporal)   Resp 16   Ht 1.829 m (6')   Wt 84.6 kg (186 lb 8 oz)   SpO2 99%   BMI 25.29 kg/m    Body mass index is 25.29 kg/m .  Physical Exam   GENERAL: alert and no distress  EYES: Eyes grossly normal to inspection, PERRL and conjunctivae and sclerae normal  HENT: ear canals and TM's normal, nose and mouth without ulcers or lesions  NECK: no adenopathy, no asymmetry, masses, or scars  RESP: lungs clear to auscultation - no rales, rhonchi or wheezes  CV: regular rate and rhythm, normal S1 S2, no S3 or S4, no murmur, click or rub, no  peripheral edema  ABDOMEN: soft, nontender, no hepatosplenomegaly, no masses and bowel sounds normal  MS: no gross musculoskeletal defects noted, no edema  SKIN: no suspicious lesions or rashes  NEURO: Normal strength and tone, mentation intact and speech normal  PSYCH: mentation appears normal, affect normal/bright            Signed Electronically by: Washington Wynn MD

## 2024-10-23 ENCOUNTER — MYC REFILL (OUTPATIENT)
Dept: FAMILY MEDICINE | Facility: CLINIC | Age: 39
End: 2024-10-23
Payer: COMMERCIAL

## 2024-10-23 DIAGNOSIS — F90.0 ATTENTION DEFICIT HYPERACTIVITY DISORDER, PREDOMINANTLY INATTENTIVE TYPE: ICD-10-CM

## 2024-10-23 RX ORDER — DEXTROAMPHETAMINE SACCHARATE, AMPHETAMINE ASPARTATE MONOHYDRATE, DEXTROAMPHETAMINE SULFATE AND AMPHETAMINE SULFATE 2.5; 2.5; 2.5; 2.5 MG/1; MG/1; MG/1; MG/1
10 CAPSULE, EXTENDED RELEASE ORAL DAILY PRN
Qty: 30 CAPSULE | Refills: 0 | Status: SHIPPED | OUTPATIENT
Start: 2024-10-23

## 2024-11-06 ENCOUNTER — MYC REFILL (OUTPATIENT)
Dept: FAMILY MEDICINE | Facility: CLINIC | Age: 39
End: 2024-11-06
Payer: COMMERCIAL

## 2024-11-06 DIAGNOSIS — F90.0 ATTENTION DEFICIT HYPERACTIVITY DISORDER, PREDOMINANTLY INATTENTIVE TYPE: ICD-10-CM

## 2024-11-06 DIAGNOSIS — F32.1 MAJOR DEPRESSIVE DISORDER, SINGLE EPISODE, MODERATE (H): ICD-10-CM

## 2024-11-06 DIAGNOSIS — F41.1 GENERALIZED ANXIETY DISORDER: ICD-10-CM

## 2024-11-06 RX ORDER — QUETIAPINE FUMARATE 100 MG/1
100 TABLET, FILM COATED ORAL AT BEDTIME
Qty: 30 TABLET | Refills: 1 | Status: CANCELLED | OUTPATIENT
Start: 2024-11-06

## 2024-11-06 RX ORDER — DEXTROAMPHETAMINE SACCHARATE, AMPHETAMINE ASPARTATE, DEXTROAMPHETAMINE SULFATE AND AMPHETAMINE SULFATE 5; 5; 5; 5 MG/1; MG/1; MG/1; MG/1
10 TABLET ORAL 2 TIMES DAILY
Qty: 30 TABLET | Refills: 0 | Status: CANCELLED | OUTPATIENT
Start: 2024-11-06

## 2024-11-11 ENCOUNTER — MYC REFILL (OUTPATIENT)
Dept: FAMILY MEDICINE | Facility: CLINIC | Age: 39
End: 2024-11-11
Payer: COMMERCIAL

## 2024-11-11 DIAGNOSIS — F41.1 GENERALIZED ANXIETY DISORDER: ICD-10-CM

## 2024-11-11 DIAGNOSIS — F32.1 MAJOR DEPRESSIVE DISORDER, SINGLE EPISODE, MODERATE (H): ICD-10-CM

## 2024-11-11 DIAGNOSIS — F90.0 ATTENTION DEFICIT HYPERACTIVITY DISORDER, PREDOMINANTLY INATTENTIVE TYPE: ICD-10-CM

## 2024-11-12 RX ORDER — DEXTROAMPHETAMINE SULFATE, DEXTROAMPHETAMINE SACCHARATE, AMPHETAMINE SULFATE AND AMPHETAMINE ASPARTATE 5; 5; 5; 5 MG/1; MG/1; MG/1; MG/1
20 CAPSULE, EXTENDED RELEASE ORAL DAILY
Qty: 30 CAPSULE | Refills: 0 | Status: SHIPPED | OUTPATIENT
Start: 2024-11-12

## 2024-11-12 RX ORDER — QUETIAPINE FUMARATE 100 MG/1
100 TABLET, FILM COATED ORAL AT BEDTIME
Qty: 30 TABLET | Refills: 1 | Status: SHIPPED | OUTPATIENT
Start: 2024-11-12

## 2024-11-20 ENCOUNTER — MYC REFILL (OUTPATIENT)
Dept: FAMILY MEDICINE | Facility: CLINIC | Age: 39
End: 2024-11-20
Payer: COMMERCIAL

## 2024-11-20 DIAGNOSIS — F90.0 ATTENTION DEFICIT HYPERACTIVITY DISORDER, PREDOMINANTLY INATTENTIVE TYPE: ICD-10-CM

## 2024-11-20 RX ORDER — DEXTROAMPHETAMINE SACCHARATE, AMPHETAMINE ASPARTATE MONOHYDRATE, DEXTROAMPHETAMINE SULFATE AND AMPHETAMINE SULFATE 2.5; 2.5; 2.5; 2.5 MG/1; MG/1; MG/1; MG/1
10 CAPSULE, EXTENDED RELEASE ORAL DAILY PRN
Qty: 30 CAPSULE | Refills: 0 | Status: SHIPPED | OUTPATIENT
Start: 2024-11-20

## 2024-12-01 ENCOUNTER — MYC REFILL (OUTPATIENT)
Dept: FAMILY MEDICINE | Facility: CLINIC | Age: 39
End: 2024-12-01
Payer: COMMERCIAL

## 2024-12-01 DIAGNOSIS — F90.0 ATTENTION DEFICIT HYPERACTIVITY DISORDER, PREDOMINANTLY INATTENTIVE TYPE: ICD-10-CM

## 2024-12-02 RX ORDER — DEXTROAMPHETAMINE SACCHARATE, AMPHETAMINE ASPARTATE, DEXTROAMPHETAMINE SULFATE AND AMPHETAMINE SULFATE 5; 5; 5; 5 MG/1; MG/1; MG/1; MG/1
10 TABLET ORAL 2 TIMES DAILY
Qty: 30 TABLET | Refills: 0 | Status: SHIPPED | OUTPATIENT
Start: 2024-12-02

## 2024-12-08 ENCOUNTER — MYC REFILL (OUTPATIENT)
Dept: FAMILY MEDICINE | Facility: CLINIC | Age: 39
End: 2024-12-08
Payer: COMMERCIAL

## 2024-12-08 DIAGNOSIS — F90.0 ATTENTION DEFICIT HYPERACTIVITY DISORDER, PREDOMINANTLY INATTENTIVE TYPE: ICD-10-CM

## 2024-12-09 ENCOUNTER — TELEPHONE (OUTPATIENT)
Dept: FAMILY MEDICINE | Facility: CLINIC | Age: 39
End: 2024-12-09
Payer: COMMERCIAL

## 2024-12-09 ENCOUNTER — MYC REFILL (OUTPATIENT)
Dept: FAMILY MEDICINE | Facility: CLINIC | Age: 39
End: 2024-12-09
Payer: COMMERCIAL

## 2024-12-09 DIAGNOSIS — F90.0 ATTENTION DEFICIT HYPERACTIVITY DISORDER, PREDOMINANTLY INATTENTIVE TYPE: ICD-10-CM

## 2024-12-09 RX ORDER — DEXTROAMPHETAMINE SACCHARATE, AMPHETAMINE ASPARTATE MONOHYDRATE, DEXTROAMPHETAMINE SULFATE AND AMPHETAMINE SULFATE 7.5; 7.5; 7.5; 7.5 MG/1; MG/1; MG/1; MG/1
30 CAPSULE, EXTENDED RELEASE ORAL DAILY
Qty: 30 CAPSULE | Refills: 0 | Status: SHIPPED | OUTPATIENT
Start: 2024-12-09

## 2024-12-09 RX ORDER — DEXTROAMPHETAMINE SULFATE, DEXTROAMPHETAMINE SACCHARATE, AMPHETAMINE SULFATE AND AMPHETAMINE ASPARTATE 5; 5; 5; 5 MG/1; MG/1; MG/1; MG/1
20 CAPSULE, EXTENDED RELEASE ORAL DAILY
Qty: 30 CAPSULE | Refills: 0 | Status: CANCELLED | OUTPATIENT
Start: 2024-12-09

## 2024-12-09 RX ORDER — DEXTROAMPHETAMINE SULFATE, DEXTROAMPHETAMINE SACCHARATE, AMPHETAMINE SULFATE AND AMPHETAMINE ASPARTATE 5; 5; 5; 5 MG/1; MG/1; MG/1; MG/1
20 CAPSULE, EXTENDED RELEASE ORAL DAILY
Qty: 30 CAPSULE | Refills: 0 | Status: SHIPPED | OUTPATIENT
Start: 2024-12-09

## 2024-12-09 RX ORDER — DEXTROAMPHETAMINE SACCHARATE, AMPHETAMINE ASPARTATE MONOHYDRATE, DEXTROAMPHETAMINE SULFATE AND AMPHETAMINE SULFATE 7.5; 7.5; 7.5; 7.5 MG/1; MG/1; MG/1; MG/1
30 CAPSULE, EXTENDED RELEASE ORAL DAILY
Refills: 0 | Status: CANCELLED | OUTPATIENT
Start: 2024-12-09

## 2024-12-09 NOTE — TELEPHONE ENCOUNTER
Script sent to pharmacy for 30 mg dose of Adderall XR in mychart encounter from patient.    Washington Wynn MD

## 2024-12-09 NOTE — TELEPHONE ENCOUNTER
Please send 30 mg of adderall Xr to pharmacy,   Currently the pharmacy is out of the 20's and 10's. 3  Pt has not picked up meds.   Thanks,   Demario Galvan RN  [facility]

## 2024-12-18 ENCOUNTER — MYC REFILL (OUTPATIENT)
Dept: FAMILY MEDICINE | Facility: CLINIC | Age: 39
End: 2024-12-18
Payer: COMMERCIAL

## 2024-12-18 DIAGNOSIS — F90.0 ATTENTION DEFICIT HYPERACTIVITY DISORDER, PREDOMINANTLY INATTENTIVE TYPE: ICD-10-CM

## 2024-12-19 RX ORDER — DEXTROAMPHETAMINE SACCHARATE, AMPHETAMINE ASPARTATE MONOHYDRATE, DEXTROAMPHETAMINE SULFATE AND AMPHETAMINE SULFATE 2.5; 2.5; 2.5; 2.5 MG/1; MG/1; MG/1; MG/1
10 CAPSULE, EXTENDED RELEASE ORAL DAILY PRN
Qty: 30 CAPSULE | Refills: 0 | OUTPATIENT
Start: 2024-12-19

## 2024-12-30 ENCOUNTER — MYC REFILL (OUTPATIENT)
Dept: FAMILY MEDICINE | Facility: CLINIC | Age: 39
End: 2024-12-30
Payer: COMMERCIAL

## 2024-12-30 DIAGNOSIS — F90.0 ATTENTION DEFICIT HYPERACTIVITY DISORDER, PREDOMINANTLY INATTENTIVE TYPE: ICD-10-CM

## 2024-12-30 RX ORDER — DEXTROAMPHETAMINE SACCHARATE, AMPHETAMINE ASPARTATE, DEXTROAMPHETAMINE SULFATE AND AMPHETAMINE SULFATE 5; 5; 5; 5 MG/1; MG/1; MG/1; MG/1
10 TABLET ORAL 2 TIMES DAILY
Qty: 30 TABLET | Refills: 0 | Status: SHIPPED | OUTPATIENT
Start: 2024-12-30

## 2025-01-07 ENCOUNTER — MYC REFILL (OUTPATIENT)
Dept: FAMILY MEDICINE | Facility: CLINIC | Age: 40
End: 2025-01-07
Payer: COMMERCIAL

## 2025-01-07 DIAGNOSIS — F90.0 ATTENTION DEFICIT HYPERACTIVITY DISORDER, PREDOMINANTLY INATTENTIVE TYPE: ICD-10-CM

## 2025-01-07 RX ORDER — DEXTROAMPHETAMINE SACCHARATE, AMPHETAMINE ASPARTATE MONOHYDRATE, DEXTROAMPHETAMINE SULFATE AND AMPHETAMINE SULFATE 7.5; 7.5; 7.5; 7.5 MG/1; MG/1; MG/1; MG/1
30 CAPSULE, EXTENDED RELEASE ORAL DAILY
Qty: 30 CAPSULE | Refills: 0 | Status: SHIPPED | OUTPATIENT
Start: 2025-01-07

## 2025-01-09 ENCOUNTER — MYC REFILL (OUTPATIENT)
Dept: FAMILY MEDICINE | Facility: CLINIC | Age: 40
End: 2025-01-09
Payer: COMMERCIAL

## 2025-01-09 DIAGNOSIS — F32.1 MAJOR DEPRESSIVE DISORDER, SINGLE EPISODE, MODERATE (H): ICD-10-CM

## 2025-01-09 DIAGNOSIS — F41.1 GENERALIZED ANXIETY DISORDER: ICD-10-CM

## 2025-01-09 RX ORDER — QUETIAPINE FUMARATE 100 MG/1
100 TABLET, FILM COATED ORAL AT BEDTIME
Qty: 30 TABLET | Refills: 1 | Status: SHIPPED | OUTPATIENT
Start: 2025-01-09

## 2025-01-21 ENCOUNTER — MYC REFILL (OUTPATIENT)
Dept: FAMILY MEDICINE | Facility: CLINIC | Age: 40
End: 2025-01-21
Payer: COMMERCIAL

## 2025-01-21 DIAGNOSIS — F90.0 ATTENTION DEFICIT HYPERACTIVITY DISORDER, PREDOMINANTLY INATTENTIVE TYPE: ICD-10-CM

## 2025-01-21 RX ORDER — DEXTROAMPHETAMINE SULFATE, DEXTROAMPHETAMINE SACCHARATE, AMPHETAMINE SULFATE AND AMPHETAMINE ASPARTATE 5; 5; 5; 5 MG/1; MG/1; MG/1; MG/1
20 CAPSULE, EXTENDED RELEASE ORAL DAILY
Qty: 30 CAPSULE | Refills: 0 | Status: SHIPPED | OUTPATIENT
Start: 2025-01-21

## 2025-01-27 ENCOUNTER — MYC REFILL (OUTPATIENT)
Dept: FAMILY MEDICINE | Facility: CLINIC | Age: 40
End: 2025-01-27
Payer: COMMERCIAL

## 2025-01-27 DIAGNOSIS — F90.0 ATTENTION DEFICIT HYPERACTIVITY DISORDER, PREDOMINANTLY INATTENTIVE TYPE: ICD-10-CM

## 2025-01-27 RX ORDER — DEXTROAMPHETAMINE SACCHARATE, AMPHETAMINE ASPARTATE, DEXTROAMPHETAMINE SULFATE AND AMPHETAMINE SULFATE 5; 5; 5; 5 MG/1; MG/1; MG/1; MG/1
10 TABLET ORAL 2 TIMES DAILY
Qty: 30 TABLET | Refills: 0 | Status: SHIPPED | OUTPATIENT
Start: 2025-01-27

## 2025-02-03 ENCOUNTER — VIRTUAL VISIT (OUTPATIENT)
Dept: FAMILY MEDICINE | Facility: CLINIC | Age: 40
End: 2025-02-03
Payer: COMMERCIAL

## 2025-02-03 DIAGNOSIS — F90.0 ATTENTION DEFICIT HYPERACTIVITY DISORDER, PREDOMINANTLY INATTENTIVE TYPE: ICD-10-CM

## 2025-02-03 DIAGNOSIS — F33.1 MODERATE EPISODE OF RECURRENT MAJOR DEPRESSIVE DISORDER (H): Primary | ICD-10-CM

## 2025-02-03 PROCEDURE — 98006 SYNCH AUDIO-VIDEO EST MOD 30: CPT | Performed by: FAMILY MEDICINE

## 2025-02-03 PROCEDURE — 96127 BRIEF EMOTIONAL/BEHAV ASSMT: CPT | Mod: 95 | Performed by: FAMILY MEDICINE

## 2025-02-03 RX ORDER — DEXTROAMPHETAMINE SACCHARATE, AMPHETAMINE ASPARTATE MONOHYDRATE, DEXTROAMPHETAMINE SULFATE AND AMPHETAMINE SULFATE 2.5; 2.5; 2.5; 2.5 MG/1; MG/1; MG/1; MG/1
10 CAPSULE, EXTENDED RELEASE ORAL DAILY PRN
Qty: 30 CAPSULE | Refills: 0 | Status: SHIPPED | OUTPATIENT
Start: 2025-02-03

## 2025-02-03 ASSESSMENT — PATIENT HEALTH QUESTIONNAIRE - PHQ9
SUM OF ALL RESPONSES TO PHQ QUESTIONS 1-9: 11
SUM OF ALL RESPONSES TO PHQ QUESTIONS 1-9: 11
10. IF YOU CHECKED OFF ANY PROBLEMS, HOW DIFFICULT HAVE THESE PROBLEMS MADE IT FOR YOU TO DO YOUR WORK, TAKE CARE OF THINGS AT HOME, OR GET ALONG WITH OTHER PEOPLE: SOMEWHAT DIFFICULT

## 2025-02-04 NOTE — PROGRESS NOTES
Fuentes is a 40 year old who is being evaluated via a billable video visit.    How would you like to obtain your AVS? MyChart  If the video visit is dropped, the invitation should be resent by: Text to cell phone: [phone]  Will anyone else be joining your video visit? No      Assessment & Plan     Moderate episode of recurrent major depressive disorder (H)  Patient reports a history of major depression and feels that symptoms have worsened over the last week or 2 and expresses a desire to go back on medication.  He remembers being on Celexa, Zoloft, and bupropion in the past and either did not find them helpful or did not like them because of side effects.  Therefore suggested a trial of fluoxetine today.  No changes were made to his other medications.  Follow-up is recommended for recheck in about 6 to 8 weeks.  - FLUoxetine (PROZAC) 20 MG capsule; Take 1 capsule (20 mg) by mouth daily.    Attention deficit hyperactivity disorder, predominantly inattentive type  Consistently he will use the Adderall XR 20 mg capsule daily but feels that he needs an extra 10 mg dose periodically depending on focus and concentration needs.  He feels that the 30 mg capsule was too much on a regular basis.  Prescription was sent to the pharmacy for the Adderall 10 mg capsule that he can take as needed in addition to his baseline prescriptions for the tablet and 20 mg capsule once daily.  - amphetamine-dextroamphetamine (ADDERALL XR) 10 MG 24 hr capsule; Take 1 capsule (10 mg) by mouth daily as needed (increased need for focus).      The longitudinal plan of care for the diagnosis(es)/condition(s) as documented were addressed during this visit. Due to the added complexity in care, I will continue to support Fuentes in the subsequent management and with ongoing continuity of care.      FUTURE APPOINTMENTS:       - Follow-up visit in 2 months    Subjective   Fuentes is a 40 year old, presenting for the following health issues:  Depression       2/3/2025     6:07 PM   Additional Questions   Roomed by Tianna     Video Start Time:  1704    History of Present Illness       Mental Health Follow-up:  Patient presents to follow-up on Depression.Patient's depression since last visit has been:  Worse  The patient is not having other symptoms associated with depression.      Any significant life events: No  Patient is not feeling anxious or having panic attacks.  Patient has no concerns about alcohol or drug use.    He eats 0-1 servings of fruits and vegetables daily.He consumes 1 sweetened beverage(s) daily.He exercises with enough effort to increase his heart rate 20 to 29 minutes per day.  He exercises with enough effort to increase his heart rate 3 or less days per week.   He is taking medications regularly.         Patient feels that he has had increasing symptoms of depression and hopelessness for the last 1 to 2 months.  He has been on medication in the past for this and would like to go back on it at this time.  He also mentions some follow-up on his ADHD today.  He thought the 30 mg capsule was too much and so he is requesting a refill today of the 10 mg extended release capsule to take on an as-needed basis with the 20 mg capsule at baseline.        9/25/2023    10:38 AM 10/21/2024     9:39 AM 2/3/2025     1:55 PM   PHQ   PHQ-9 Total Score 9 6 11    Q9: Thoughts of better off dead/self-harm past 2 weeks Several days Not at all Not at all   F/U: Thoughts of suicide or self-harm No     F/U: Safety concerns No         Patient-reported         9/25/2023    10:38 AM 10/17/2023     8:51 AM 10/21/2024     9:45 AM   YANA-7 SCORE   Total Score 7 (mild anxiety) 4 (minimal anxiety) 5 (mild anxiety)   Total Score 7 4 5             Review of Systems  Constitutional, HEENT, cardiovascular, pulmonary, gi and gu systems are negative, except as otherwise noted.      Objective           Vitals:  No vitals were obtained today due to virtual visit.    Physical Exam    GENERAL: alert and no distress  EYES: Eyes grossly normal to inspection.  No discharge or erythema, or obvious scleral/conjunctival abnormalities.  RESP: No audible wheeze, cough, or visible cyanosis.    SKIN: Visible skin clear. No significant rash, abnormal pigmentation or lesions.  NEURO: Cranial nerves grossly intact.  Mentation and speech appropriate for age.  PSYCH: Appropriate affect, tone, and pace of words          Video-Visit Details    Type of service:  Video Visit   Video End Time: 1715  Originating Location (pt. Location): Home    Distant Location (provider location):  On-site  Platform used for Video Visit: Deonna  Signed Electronically by: Washington Wynn MD

## 2025-02-18 ENCOUNTER — MYC REFILL (OUTPATIENT)
Dept: FAMILY MEDICINE | Facility: CLINIC | Age: 40
End: 2025-02-18
Payer: COMMERCIAL

## 2025-02-18 DIAGNOSIS — F90.0 ATTENTION DEFICIT HYPERACTIVITY DISORDER, PREDOMINANTLY INATTENTIVE TYPE: ICD-10-CM

## 2025-02-18 RX ORDER — DEXTROAMPHETAMINE SULFATE, DEXTROAMPHETAMINE SACCHARATE, AMPHETAMINE SULFATE AND AMPHETAMINE ASPARTATE 5; 5; 5; 5 MG/1; MG/1; MG/1; MG/1
20 CAPSULE, EXTENDED RELEASE ORAL DAILY
Qty: 30 CAPSULE | Refills: 0 | Status: SHIPPED | OUTPATIENT
Start: 2025-02-18

## 2025-02-25 ENCOUNTER — MYC REFILL (OUTPATIENT)
Dept: FAMILY MEDICINE | Facility: CLINIC | Age: 40
End: 2025-02-25
Payer: COMMERCIAL

## 2025-02-25 DIAGNOSIS — F90.0 ATTENTION DEFICIT HYPERACTIVITY DISORDER, PREDOMINANTLY INATTENTIVE TYPE: ICD-10-CM

## 2025-02-25 RX ORDER — DEXTROAMPHETAMINE SACCHARATE, AMPHETAMINE ASPARTATE, DEXTROAMPHETAMINE SULFATE AND AMPHETAMINE SULFATE 5; 5; 5; 5 MG/1; MG/1; MG/1; MG/1
10 TABLET ORAL 2 TIMES DAILY
Qty: 30 TABLET | Refills: 0 | Status: SHIPPED | OUTPATIENT
Start: 2025-02-25

## 2025-03-03 ENCOUNTER — MYC REFILL (OUTPATIENT)
Dept: FAMILY MEDICINE | Facility: CLINIC | Age: 40
End: 2025-03-03
Payer: COMMERCIAL

## 2025-03-03 DIAGNOSIS — F90.0 ATTENTION DEFICIT HYPERACTIVITY DISORDER, PREDOMINANTLY INATTENTIVE TYPE: ICD-10-CM

## 2025-03-03 RX ORDER — DEXTROAMPHETAMINE SACCHARATE, AMPHETAMINE ASPARTATE MONOHYDRATE, DEXTROAMPHETAMINE SULFATE AND AMPHETAMINE SULFATE 2.5; 2.5; 2.5; 2.5 MG/1; MG/1; MG/1; MG/1
10 CAPSULE, EXTENDED RELEASE ORAL DAILY PRN
Qty: 30 CAPSULE | Refills: 0 | Status: SHIPPED | OUTPATIENT
Start: 2025-03-03

## 2025-03-04 ENCOUNTER — PATIENT OUTREACH (OUTPATIENT)
Dept: CARE COORDINATION | Facility: CLINIC | Age: 40
End: 2025-03-04
Payer: COMMERCIAL

## 2025-03-16 ENCOUNTER — MYC REFILL (OUTPATIENT)
Dept: FAMILY MEDICINE | Facility: CLINIC | Age: 40
End: 2025-03-16
Payer: COMMERCIAL

## 2025-03-16 DIAGNOSIS — F90.0 ATTENTION DEFICIT HYPERACTIVITY DISORDER, PREDOMINANTLY INATTENTIVE TYPE: ICD-10-CM

## 2025-03-16 DIAGNOSIS — F41.1 GENERALIZED ANXIETY DISORDER: ICD-10-CM

## 2025-03-16 DIAGNOSIS — F32.1 MAJOR DEPRESSIVE DISORDER, SINGLE EPISODE, MODERATE (H): ICD-10-CM

## 2025-03-17 RX ORDER — QUETIAPINE FUMARATE 100 MG/1
100 TABLET, FILM COATED ORAL AT BEDTIME
Qty: 30 TABLET | Refills: 1 | Status: SHIPPED | OUTPATIENT
Start: 2025-03-17

## 2025-03-17 RX ORDER — DEXTROAMPHETAMINE SULFATE, DEXTROAMPHETAMINE SACCHARATE, AMPHETAMINE SULFATE AND AMPHETAMINE ASPARTATE 5; 5; 5; 5 MG/1; MG/1; MG/1; MG/1
20 CAPSULE, EXTENDED RELEASE ORAL DAILY
Qty: 30 CAPSULE | Refills: 0 | Status: SHIPPED | OUTPATIENT
Start: 2025-03-17

## 2025-03-23 ENCOUNTER — MYC REFILL (OUTPATIENT)
Dept: FAMILY MEDICINE | Facility: CLINIC | Age: 40
End: 2025-03-23
Payer: COMMERCIAL

## 2025-03-23 DIAGNOSIS — F90.0 ATTENTION DEFICIT HYPERACTIVITY DISORDER, PREDOMINANTLY INATTENTIVE TYPE: ICD-10-CM

## 2025-03-24 ENCOUNTER — PATIENT OUTREACH (OUTPATIENT)
Dept: CARE COORDINATION | Facility: CLINIC | Age: 40
End: 2025-03-24
Payer: COMMERCIAL

## 2025-03-24 DIAGNOSIS — F90.0 ATTENTION DEFICIT HYPERACTIVITY DISORDER, PREDOMINANTLY INATTENTIVE TYPE: ICD-10-CM

## 2025-03-24 RX ORDER — DEXTROAMPHETAMINE SACCHARATE, AMPHETAMINE ASPARTATE, DEXTROAMPHETAMINE SULFATE AND AMPHETAMINE SULFATE 5; 5; 5; 5 MG/1; MG/1; MG/1; MG/1
10 TABLET ORAL 2 TIMES DAILY
Qty: 30 TABLET | Refills: 0 | Status: SHIPPED | OUTPATIENT
Start: 2025-03-24

## 2025-03-24 RX ORDER — DEXTROAMPHETAMINE SACCHARATE, AMPHETAMINE ASPARTATE, DEXTROAMPHETAMINE SULFATE AND AMPHETAMINE SULFATE 5; 5; 5; 5 MG/1; MG/1; MG/1; MG/1
10 TABLET ORAL 2 TIMES DAILY
Qty: 30 TABLET | Refills: 0 | OUTPATIENT
Start: 2025-03-24

## 2025-03-24 NOTE — TELEPHONE ENCOUNTER
Patient put in a refill request which got denied. Stated he is on XR as well which was refilled on 03/17 but needs amphetamine-dextroamphetamine (ADDERALL) 20 MG tablet

## 2025-03-31 ENCOUNTER — MYC REFILL (OUTPATIENT)
Dept: FAMILY MEDICINE | Facility: CLINIC | Age: 40
End: 2025-03-31
Payer: COMMERCIAL

## 2025-03-31 DIAGNOSIS — F90.0 ATTENTION DEFICIT HYPERACTIVITY DISORDER, PREDOMINANTLY INATTENTIVE TYPE: ICD-10-CM

## 2025-04-01 RX ORDER — DEXTROAMPHETAMINE SACCHARATE, AMPHETAMINE ASPARTATE MONOHYDRATE, DEXTROAMPHETAMINE SULFATE AND AMPHETAMINE SULFATE 2.5; 2.5; 2.5; 2.5 MG/1; MG/1; MG/1; MG/1
10 CAPSULE, EXTENDED RELEASE ORAL DAILY PRN
Qty: 30 CAPSULE | Refills: 0 | Status: SHIPPED | OUTPATIENT
Start: 2025-04-01

## 2025-04-10 ENCOUNTER — MYC REFILL (OUTPATIENT)
Dept: FAMILY MEDICINE | Facility: CLINIC | Age: 40
End: 2025-04-10
Payer: COMMERCIAL

## 2025-04-10 DIAGNOSIS — F41.1 GENERALIZED ANXIETY DISORDER: ICD-10-CM

## 2025-04-10 DIAGNOSIS — F32.1 MAJOR DEPRESSIVE DISORDER, SINGLE EPISODE, MODERATE (H): ICD-10-CM

## 2025-04-10 DIAGNOSIS — F90.0 ATTENTION DEFICIT HYPERACTIVITY DISORDER, PREDOMINANTLY INATTENTIVE TYPE: ICD-10-CM

## 2025-04-10 RX ORDER — DEXTROAMPHETAMINE SULFATE, DEXTROAMPHETAMINE SACCHARATE, AMPHETAMINE SULFATE AND AMPHETAMINE ASPARTATE 5; 5; 5; 5 MG/1; MG/1; MG/1; MG/1
20 CAPSULE, EXTENDED RELEASE ORAL DAILY
Qty: 30 CAPSULE | Refills: 0 | Status: CANCELLED | OUTPATIENT
Start: 2025-04-10

## 2025-04-10 RX ORDER — QUETIAPINE FUMARATE 100 MG/1
100 TABLET, FILM COATED ORAL AT BEDTIME
Qty: 30 TABLET | Refills: 1 | OUTPATIENT
Start: 2025-04-10

## 2025-04-21 ENCOUNTER — MYC REFILL (OUTPATIENT)
Dept: FAMILY MEDICINE | Facility: CLINIC | Age: 40
End: 2025-04-21
Payer: COMMERCIAL

## 2025-04-21 DIAGNOSIS — F90.0 ATTENTION DEFICIT HYPERACTIVITY DISORDER, PREDOMINANTLY INATTENTIVE TYPE: ICD-10-CM

## 2025-04-22 RX ORDER — DEXTROAMPHETAMINE SACCHARATE, AMPHETAMINE ASPARTATE, DEXTROAMPHETAMINE SULFATE AND AMPHETAMINE SULFATE 5; 5; 5; 5 MG/1; MG/1; MG/1; MG/1
10 TABLET ORAL 2 TIMES DAILY
Qty: 30 TABLET | Refills: 0 | Status: SHIPPED | OUTPATIENT
Start: 2025-04-24

## 2025-05-04 ENCOUNTER — MYC REFILL (OUTPATIENT)
Dept: FAMILY MEDICINE | Facility: CLINIC | Age: 40
End: 2025-05-04
Payer: COMMERCIAL

## 2025-05-04 DIAGNOSIS — F90.0 ATTENTION DEFICIT HYPERACTIVITY DISORDER, PREDOMINANTLY INATTENTIVE TYPE: ICD-10-CM

## 2025-05-05 RX ORDER — DEXTROAMPHETAMINE SULFATE, DEXTROAMPHETAMINE SACCHARATE, AMPHETAMINE SULFATE AND AMPHETAMINE ASPARTATE 5; 5; 5; 5 MG/1; MG/1; MG/1; MG/1
20 CAPSULE, EXTENDED RELEASE ORAL DAILY
Qty: 30 CAPSULE | Refills: 0 | Status: SHIPPED | OUTPATIENT
Start: 2025-05-05

## 2025-05-21 ENCOUNTER — MYC REFILL (OUTPATIENT)
Dept: FAMILY MEDICINE | Facility: CLINIC | Age: 40
End: 2025-05-21
Payer: COMMERCIAL

## 2025-05-21 DIAGNOSIS — F90.0 ATTENTION DEFICIT HYPERACTIVITY DISORDER, PREDOMINANTLY INATTENTIVE TYPE: ICD-10-CM

## 2025-05-22 RX ORDER — DEXTROAMPHETAMINE SACCHARATE, AMPHETAMINE ASPARTATE, DEXTROAMPHETAMINE SULFATE AND AMPHETAMINE SULFATE 5; 5; 5; 5 MG/1; MG/1; MG/1; MG/1
10 TABLET ORAL 2 TIMES DAILY
Qty: 30 TABLET | Refills: 0 | Status: SHIPPED | OUTPATIENT
Start: 2025-05-22

## 2025-05-28 ENCOUNTER — MYC REFILL (OUTPATIENT)
Dept: FAMILY MEDICINE | Facility: CLINIC | Age: 40
End: 2025-05-28
Payer: COMMERCIAL

## 2025-05-28 DIAGNOSIS — F90.0 ATTENTION DEFICIT HYPERACTIVITY DISORDER, PREDOMINANTLY INATTENTIVE TYPE: ICD-10-CM

## 2025-05-28 RX ORDER — DEXTROAMPHETAMINE SACCHARATE, AMPHETAMINE ASPARTATE MONOHYDRATE, DEXTROAMPHETAMINE SULFATE AND AMPHETAMINE SULFATE 7.5; 7.5; 7.5; 7.5 MG/1; MG/1; MG/1; MG/1
30 CAPSULE, EXTENDED RELEASE ORAL DAILY
Qty: 30 CAPSULE | Refills: 0 | Status: SHIPPED | OUTPATIENT
Start: 2025-05-28

## 2025-06-01 ENCOUNTER — MYC REFILL (OUTPATIENT)
Dept: FAMILY MEDICINE | Facility: CLINIC | Age: 40
End: 2025-06-01
Payer: COMMERCIAL

## 2025-06-01 DIAGNOSIS — F90.0 ATTENTION DEFICIT HYPERACTIVITY DISORDER, PREDOMINANTLY INATTENTIVE TYPE: ICD-10-CM

## 2025-06-02 RX ORDER — DEXTROAMPHETAMINE SULFATE, DEXTROAMPHETAMINE SACCHARATE, AMPHETAMINE SULFATE AND AMPHETAMINE ASPARTATE 5; 5; 5; 5 MG/1; MG/1; MG/1; MG/1
20 CAPSULE, EXTENDED RELEASE ORAL DAILY
Qty: 30 CAPSULE | Refills: 0 | OUTPATIENT
Start: 2025-06-02

## 2025-06-09 ENCOUNTER — MYC REFILL (OUTPATIENT)
Dept: FAMILY MEDICINE | Facility: CLINIC | Age: 40
End: 2025-06-09
Payer: COMMERCIAL

## 2025-06-09 DIAGNOSIS — F90.0 ATTENTION DEFICIT HYPERACTIVITY DISORDER, PREDOMINANTLY INATTENTIVE TYPE: ICD-10-CM

## 2025-06-09 RX ORDER — DEXTROAMPHETAMINE SACCHARATE, AMPHETAMINE ASPARTATE MONOHYDRATE, DEXTROAMPHETAMINE SULFATE AND AMPHETAMINE SULFATE 2.5; 2.5; 2.5; 2.5 MG/1; MG/1; MG/1; MG/1
10 CAPSULE, EXTENDED RELEASE ORAL DAILY PRN
Qty: 30 CAPSULE | Refills: 0 | Status: SHIPPED | OUTPATIENT
Start: 2025-06-09

## 2025-06-15 ENCOUNTER — MYC REFILL (OUTPATIENT)
Dept: FAMILY MEDICINE | Facility: CLINIC | Age: 40
End: 2025-06-15
Payer: COMMERCIAL

## 2025-06-15 DIAGNOSIS — F90.0 ATTENTION DEFICIT HYPERACTIVITY DISORDER, PREDOMINANTLY INATTENTIVE TYPE: ICD-10-CM

## 2025-06-16 RX ORDER — DEXTROAMPHETAMINE SULFATE, DEXTROAMPHETAMINE SACCHARATE, AMPHETAMINE SULFATE AND AMPHETAMINE ASPARTATE 5; 5; 5; 5 MG/1; MG/1; MG/1; MG/1
20 CAPSULE, EXTENDED RELEASE ORAL DAILY
Qty: 30 CAPSULE | Refills: 0 | Status: SHIPPED | OUTPATIENT
Start: 2025-06-16

## 2025-06-24 ENCOUNTER — MYC REFILL (OUTPATIENT)
Dept: FAMILY MEDICINE | Facility: CLINIC | Age: 40
End: 2025-06-24
Payer: COMMERCIAL

## 2025-06-24 DIAGNOSIS — F90.0 ATTENTION DEFICIT HYPERACTIVITY DISORDER, PREDOMINANTLY INATTENTIVE TYPE: ICD-10-CM

## 2025-06-25 RX ORDER — DEXTROAMPHETAMINE SACCHARATE, AMPHETAMINE ASPARTATE MONOHYDRATE, DEXTROAMPHETAMINE SULFATE AND AMPHETAMINE SULFATE 7.5; 7.5; 7.5; 7.5 MG/1; MG/1; MG/1; MG/1
30 CAPSULE, EXTENDED RELEASE ORAL DAILY
Qty: 30 CAPSULE | Refills: 0 | OUTPATIENT
Start: 2025-06-25

## 2025-07-02 ENCOUNTER — MYC REFILL (OUTPATIENT)
Dept: FAMILY MEDICINE | Facility: CLINIC | Age: 40
End: 2025-07-02
Payer: COMMERCIAL

## 2025-07-02 DIAGNOSIS — F90.0 ATTENTION DEFICIT HYPERACTIVITY DISORDER, PREDOMINANTLY INATTENTIVE TYPE: ICD-10-CM

## 2025-07-02 RX ORDER — DEXTROAMPHETAMINE SACCHARATE, AMPHETAMINE ASPARTATE MONOHYDRATE, DEXTROAMPHETAMINE SULFATE AND AMPHETAMINE SULFATE 7.5; 7.5; 7.5; 7.5 MG/1; MG/1; MG/1; MG/1
30 CAPSULE, EXTENDED RELEASE ORAL DAILY
Qty: 30 CAPSULE | Refills: 0 | Status: CANCELLED | OUTPATIENT
Start: 2025-07-02

## 2025-07-06 ENCOUNTER — MYC REFILL (OUTPATIENT)
Dept: FAMILY MEDICINE | Facility: CLINIC | Age: 40
End: 2025-07-06
Payer: COMMERCIAL

## 2025-07-06 DIAGNOSIS — F90.0 ATTENTION DEFICIT HYPERACTIVITY DISORDER, PREDOMINANTLY INATTENTIVE TYPE: ICD-10-CM

## 2025-07-06 RX ORDER — DEXTROAMPHETAMINE SACCHARATE, AMPHETAMINE ASPARTATE MONOHYDRATE, DEXTROAMPHETAMINE SULFATE AND AMPHETAMINE SULFATE 2.5; 2.5; 2.5; 2.5 MG/1; MG/1; MG/1; MG/1
10 CAPSULE, EXTENDED RELEASE ORAL DAILY PRN
Qty: 30 CAPSULE | Refills: 0 | Status: CANCELLED | OUTPATIENT
Start: 2025-07-06

## 2025-07-07 ENCOUNTER — MYC REFILL (OUTPATIENT)
Dept: FAMILY MEDICINE | Facility: CLINIC | Age: 40
End: 2025-07-07
Payer: COMMERCIAL

## 2025-07-07 DIAGNOSIS — F90.0 ATTENTION DEFICIT HYPERACTIVITY DISORDER, PREDOMINANTLY INATTENTIVE TYPE: ICD-10-CM

## 2025-07-07 RX ORDER — DEXTROAMPHETAMINE SACCHARATE, AMPHETAMINE ASPARTATE, DEXTROAMPHETAMINE SULFATE AND AMPHETAMINE SULFATE 5; 5; 5; 5 MG/1; MG/1; MG/1; MG/1
10 TABLET ORAL 2 TIMES DAILY
Qty: 30 TABLET | Refills: 0 | Status: CANCELLED | OUTPATIENT
Start: 2025-07-07

## 2025-07-07 RX ORDER — DEXTROAMPHETAMINE SACCHARATE, AMPHETAMINE ASPARTATE MONOHYDRATE, DEXTROAMPHETAMINE SULFATE AND AMPHETAMINE SULFATE 7.5; 7.5; 7.5; 7.5 MG/1; MG/1; MG/1; MG/1
30 CAPSULE, EXTENDED RELEASE ORAL DAILY
Qty: 30 CAPSULE | Refills: 0 | Status: SHIPPED | OUTPATIENT
Start: 2025-07-07

## 2025-07-07 RX ORDER — DEXTROAMPHETAMINE SACCHARATE, AMPHETAMINE ASPARTATE MONOHYDRATE, DEXTROAMPHETAMINE SULFATE AND AMPHETAMINE SULFATE 7.5; 7.5; 7.5; 7.5 MG/1; MG/1; MG/1; MG/1
30 CAPSULE, EXTENDED RELEASE ORAL DAILY
Qty: 30 CAPSULE | Refills: 0 | Status: CANCELLED | OUTPATIENT
Start: 2025-07-07

## 2025-07-17 ENCOUNTER — MYC REFILL (OUTPATIENT)
Dept: FAMILY MEDICINE | Facility: CLINIC | Age: 40
End: 2025-07-17
Payer: COMMERCIAL

## 2025-07-17 DIAGNOSIS — F90.0 ATTENTION DEFICIT HYPERACTIVITY DISORDER, PREDOMINANTLY INATTENTIVE TYPE: ICD-10-CM

## 2025-07-17 RX ORDER — DEXTROAMPHETAMINE SACCHARATE, AMPHETAMINE ASPARTATE, DEXTROAMPHETAMINE SULFATE AND AMPHETAMINE SULFATE 5; 5; 5; 5 MG/1; MG/1; MG/1; MG/1
10 TABLET ORAL 2 TIMES DAILY
Qty: 30 TABLET | Refills: 0 | Status: SHIPPED | OUTPATIENT
Start: 2025-07-18

## 2025-07-29 ENCOUNTER — TELEPHONE (OUTPATIENT)
Dept: BEHAVIORAL HEALTH | Facility: CLINIC | Age: 40
End: 2025-07-29
Payer: COMMERCIAL

## 2025-07-29 ENCOUNTER — TELEPHONE (OUTPATIENT)
Dept: BEHAVIORAL HEALTH | Facility: CLINIC | Age: 40
End: 2025-07-29

## 2025-07-29 ENCOUNTER — HOSPITAL ENCOUNTER (INPATIENT)
Facility: CLINIC | Age: 40
End: 2025-07-29
Attending: FAMILY MEDICINE | Admitting: PSYCHIATRY & NEUROLOGY
Payer: COMMERCIAL

## 2025-07-29 DIAGNOSIS — F90.0 ATTENTION DEFICIT HYPERACTIVITY DISORDER, PREDOMINANTLY INATTENTIVE TYPE: ICD-10-CM

## 2025-07-29 DIAGNOSIS — F41.1 GENERALIZED ANXIETY DISORDER: ICD-10-CM

## 2025-07-29 DIAGNOSIS — R45.851 SUICIDAL IDEATION: Primary | ICD-10-CM

## 2025-07-29 DIAGNOSIS — F31.9 BIPOLAR 1 DISORDER (H): ICD-10-CM

## 2025-07-29 DIAGNOSIS — F32.1 MAJOR DEPRESSIVE DISORDER, SINGLE EPISODE, MODERATE (H): ICD-10-CM

## 2025-07-29 PROBLEM — F41.9 ANXIETY: Status: ACTIVE | Noted: 2025-07-29

## 2025-07-29 LAB
AMPHETAMINES UR QL SCN: NORMAL
BARBITURATES UR QL SCN: NORMAL
BENZODIAZ UR QL SCN: NORMAL
BZE UR QL SCN: NORMAL
CANNABINOIDS UR QL SCN: NORMAL
FENTANYL UR QL: NORMAL
OPIATES UR QL SCN: NORMAL
PCP QUAL URINE (ROCHE): NORMAL

## 2025-07-29 PROCEDURE — 99207 PR NO CHARGE LOS: CPT | Performed by: NURSE PRACTITIONER

## 2025-07-29 PROCEDURE — 80307 DRUG TEST PRSMV CHEM ANLYZR: CPT

## 2025-07-29 PROCEDURE — 124N000002 HC R&B MH UMMC

## 2025-07-29 PROCEDURE — 250N000013 HC RX MED GY IP 250 OP 250 PS 637: Performed by: NURSE PRACTITIONER

## 2025-07-29 PROCEDURE — 99418 PROLNG IP/OBS E/M EA 15 MIN: CPT | Performed by: NURSE PRACTITIONER

## 2025-07-29 PROCEDURE — 99255 IP/OBS CONSLTJ NEW/EST HI 80: CPT | Performed by: NURSE PRACTITIONER

## 2025-07-29 PROCEDURE — 99285 EMERGENCY DEPT VISIT HI MDM: CPT | Performed by: FAMILY MEDICINE

## 2025-07-29 PROCEDURE — 250N000013 HC RX MED GY IP 250 OP 250 PS 637: Performed by: PSYCHIATRY & NEUROLOGY

## 2025-07-29 PROCEDURE — 99285 EMERGENCY DEPT VISIT HI MDM: CPT | Mod: FS | Performed by: FAMILY MEDICINE

## 2025-07-29 PROCEDURE — 250N000013 HC RX MED GY IP 250 OP 250 PS 637

## 2025-07-29 RX ORDER — LORAZEPAM 0.5 MG/1
0.5 TABLET ORAL ONCE
Status: COMPLETED | OUTPATIENT
Start: 2025-07-29 | End: 2025-07-29

## 2025-07-29 RX ORDER — LURASIDONE HYDROCHLORIDE 20 MG/1
20 TABLET, FILM COATED ORAL
Status: DISCONTINUED | OUTPATIENT
Start: 2025-07-29 | End: 2025-07-31

## 2025-07-29 RX ORDER — DEXTROAMPHETAMINE SACCHARATE, AMPHETAMINE ASPARTATE MONOHYDRATE, DEXTROAMPHETAMINE SULFATE AND AMPHETAMINE SULFATE 7.5; 7.5; 7.5; 7.5 MG/1; MG/1; MG/1; MG/1
30 CAPSULE, EXTENDED RELEASE ORAL DAILY
Refills: 0 | Status: DISCONTINUED | OUTPATIENT
Start: 2025-07-30 | End: 2025-07-30

## 2025-07-29 RX ORDER — HYDROXYZINE HYDROCHLORIDE 25 MG/1
25 TABLET, FILM COATED ORAL EVERY 4 HOURS PRN
Status: DISCONTINUED | OUTPATIENT
Start: 2025-07-29 | End: 2025-07-29

## 2025-07-29 RX ORDER — TRAZODONE HYDROCHLORIDE 50 MG/1
50 TABLET ORAL
Status: DISPENSED | OUTPATIENT
Start: 2025-07-29

## 2025-07-29 RX ORDER — ACETAMINOPHEN 325 MG/1
650 TABLET ORAL EVERY 4 HOURS PRN
Status: DISPENSED | OUTPATIENT
Start: 2025-07-29

## 2025-07-29 RX ORDER — HYDROXYZINE HYDROCHLORIDE 25 MG/1
25 TABLET, FILM COATED ORAL EVERY 4 HOURS PRN
Status: DISPENSED | OUTPATIENT
Start: 2025-07-29

## 2025-07-29 RX ORDER — DEXTROAMPHETAMINE SACCHARATE, AMPHETAMINE ASPARTATE, DEXTROAMPHETAMINE SULFATE AND AMPHETAMINE SULFATE 2.5; 2.5; 2.5; 2.5 MG/1; MG/1; MG/1; MG/1
10 TABLET ORAL 2 TIMES DAILY
Refills: 0 | Status: DISCONTINUED | OUTPATIENT
Start: 2025-07-30 | End: 2025-07-30

## 2025-07-29 RX ORDER — POLYETHYLENE GLYCOL 3350 17 G/17G
17 POWDER, FOR SOLUTION ORAL DAILY PRN
Status: ACTIVE | OUTPATIENT
Start: 2025-07-29

## 2025-07-29 RX ORDER — OLANZAPINE 10 MG/1
10 TABLET, FILM COATED ORAL 3 TIMES DAILY PRN
Status: ACTIVE | OUTPATIENT
Start: 2025-07-29

## 2025-07-29 RX ORDER — OLANZAPINE 10 MG/2ML
10 INJECTION, POWDER, FOR SOLUTION INTRAMUSCULAR 3 TIMES DAILY PRN
Status: ACTIVE | OUTPATIENT
Start: 2025-07-29

## 2025-07-29 RX ORDER — MAGNESIUM HYDROXIDE/ALUMINUM HYDROXICE/SIMETHICONE 120; 1200; 1200 MG/30ML; MG/30ML; MG/30ML
30 SUSPENSION ORAL EVERY 4 HOURS PRN
Status: ACTIVE | OUTPATIENT
Start: 2025-07-29

## 2025-07-29 RX ORDER — QUETIAPINE FUMARATE 100 MG/1
100 TABLET, FILM COATED ORAL AT BEDTIME
Status: DISCONTINUED | OUTPATIENT
Start: 2025-07-29 | End: 2025-07-31

## 2025-07-29 RX ADMIN — LORAZEPAM 0.5 MG: 0.5 TABLET ORAL at 12:22

## 2025-07-29 RX ADMIN — TRAZODONE HYDROCHLORIDE 50 MG: 50 TABLET ORAL at 21:55

## 2025-07-29 RX ADMIN — LURASIDONE HYDROCHLORIDE 20 MG: 20 TABLET, FILM COATED ORAL at 16:37

## 2025-07-29 RX ADMIN — QUETIAPINE FUMARATE 100 MG: 100 TABLET ORAL at 20:49

## 2025-07-29 ASSESSMENT — COLUMBIA-SUICIDE SEVERITY RATING SCALE - C-SSRS
6. HAVE YOU EVER DONE ANYTHING, STARTED TO DO ANYTHING, OR PREPARED TO DO ANYTHING TO END YOUR LIFE?: YES
5. HAVE YOU STARTED TO WORK OUT OR WORKED OUT THE DETAILS OF HOW TO KILL YOURSELF? DO YOU INTEND TO CARRY OUT THIS PLAN?: NO
2. HAVE YOU ACTUALLY HAD ANY THOUGHTS OF KILLING YOURSELF IN THE PAST MONTH?: YES
3. HAVE YOU BEEN THINKING ABOUT HOW YOU MIGHT KILL YOURSELF?: NO
4. HAVE YOU HAD THESE THOUGHTS AND HAD SOME INTENTION OF ACTING ON THEM?: YES
1. IN THE PAST MONTH, HAVE YOU WISHED YOU WERE DEAD OR WISHED YOU COULD GO TO SLEEP AND NOT WAKE UP?: YES

## 2025-07-29 ASSESSMENT — ACTIVITIES OF DAILY LIVING (ADL)
ADLS_ACUITY_SCORE: 15
ADLS_ACUITY_SCORE: 15
ADLS_ACUITY_SCORE: 41
ADLS_ACUITY_SCORE: 15

## 2025-07-29 NOTE — ED TRIAGE NOTES
Pt states that he feels like he is going to harm himself. Pt does not trust himself alone and wants help, has tried medications but nothing has worked. Pt states he has no plan just intent to end his own life. Pt feels hopeless in his current situation.      Triage Assessment (Adult)       Row Name 07/29/25 1145          Triage Assessment    Airway WDL WDL        Respiratory WDL    Respiratory WDL WDL        Skin Circulation/Temperature WDL    Skin Circulation/Temperature WDL WDL        Cardiac WDL    Cardiac WDL WDL        Peripheral/Neurovascular WDL    Peripheral Neurovascular WDL WDL        Cognitive/Neuro/Behavioral WDL    Cognitive/Neuro/Behavioral WDL X;arousability;level of consciousness;mood/behavior;orientation;speech     Level of Consciousness alert     Arousal Level opens eyes spontaneously     Orientation oriented x 4     Speech clear;spontaneous     Mood/Behavior sad;cooperative        Pupils (CN II)    Pupil PERRLA yes     Pupil Size Left 3 mm     Pupil Size Right 3 mm        [place] Coma Scale    Best Eye Response 4-->(E4) spontaneous     Best Motor Response 6-->(M6) obeys commands     Best Verbal Response 5-->(V5) oriented     [place] Coma Scale Score 15     Assessment Qualifiers patient not sedated/intubated

## 2025-07-29 NOTE — PHARMACY-ADMISSION MEDICATION HISTORY
Pharmacist Admission Medication History    Admission medication history is complete. The information provided in this note is only as accurate as the sources available at the time of the update.    Information Source(s): Patient and CareEverywhere/SureScripts via in-person    Pertinent Information: None     Changes made to PTA medication list:  Added: None  Deleted:   Abilify   Prozac   Adderall 20 mg XR capsule (patient takes 30 mg)  Changed: None    Allergies reviewed with patient and updates made in EHR: yes    Medication History Completed By: Jac Hensley PharmD 7/29/2025 1:43 PM    PTA Med List   Medication Sig Last Dose/Taking    amphetamine-dextroamphetamine (ADDERALL XR) 30 MG 24 hr capsule Take 1 capsule (30 mg) by mouth daily. 7/28/2025    amphetamine-dextroamphetamine (ADDERALL) 20 MG tablet Take 0.5 tablets (10 mg) by mouth 2 times daily. 7/28/2025    QUEtiapine (SEROQUEL) 100 MG tablet Take 1 tablet (100 mg) by mouth at bedtime. 7/28/2025

## 2025-07-29 NOTE — ED PROVIDER NOTES
[facility] EMERGENCY DEPARTMENT ([facility])    7/29/25      ED PROVIDER NOTE     History     Chief Complaint   Patient presents with    Suicidal     Pt states that he feels like he is going to harm himself. Pt does not trust himself alone and wants help, has tried medications but nothing has worked.      The history is provided by the patient and medical records.     Fuentes Zamora is a 40 year old male with history of major depressive disorder, generalized anxiety disorder, panic disorder, ADHD, social phobias, prior deliberate medication overdose (2022) who presents to the emergency department with suicidal ideation.  Patient states that he has tried different psychiatric medications but they have not been helpful.  He states that his suicidal ideation has been becoming more prevalent and severe over the past several months.  He states that over the past couple days, he has been having thoughts of strangling himself via hanging or suffocating himself.  He states that if he would leave the ER today, he would likely attempt this.  He has been compliant with his medications but states that they do not seem to be helping his symptoms at all.  He is otherwise denying any hallucinations or HI.  His psychiatric medications have been managed by his primary care provider.  He does not have a psychologist, psychiatrist, or therapist.  He denies alcohol use, marijuana use, or illicit drug use otherwise.    Epic records reviewed, patient did have hospitalization from 11/28-11/29/2022 after suicide attempt via medication overdose.  At that time he had taken about 7 to 12 tablets of Flexeril along with half a liter of vodka.  He was seen at [facility] emergency department in [place]. Unfortunately he became hypotensive, ultimately required Levophed, IV fluids and monitoring on critical care floor.  He was admitted medically under [facility] act.      Past Medical History  Past Medical History:   Diagnosis Date    Anxiety      Depressive disorder     anxiety    Major depressive disorder with single episode     hospitalized for SI    Other acne      Past Surgical History:   Procedure Laterality Date    HC TOOTH EXTRACTION W/FORCEP      Under general anesthesia     amphetamine-dextroamphetamine (ADDERALL XR) 30 MG 24 hr capsule  amphetamine-dextroamphetamine (ADDERALL) 20 MG tablet  QUEtiapine (SEROQUEL) 100 MG tablet      No Known Allergies  Family History  Family History   Problem Relation Age of Onset    Cancer Mother         Breast cancer age 52    Depression Mother     Anxiety Disorder Mother     Substance Abuse Father     Substance Abuse Sister     Bipolar Disorder Sister     Anxiety Disorder Sister     Depression Sister     Thrombocytopenia Son         2 month hospital stay, now off meds     Social History   Social History     Tobacco Use    Smoking status: Every Day     Types: Vaping Device    Smokeless tobacco: Never   Vaping Use    Vaping status: Every Day    Substances: Nicotine    Devices: Disposable, Refillable tank   Substance Use Topics    Alcohol use: Not Currently     Comment: Variable    Drug use: No      Past medical history, past surgical history, medications, allergies, family history, and social history were reviewed with the patient. No additional pertinent items.     A medically appropriate review of systems was performed with pertinent positives and negatives noted in the HPI, and all other systems negative.    Physical Exam   BP: 132/73  Pulse: 108  Temp: 98.6  F (37  C)  Resp: 17  Height: 182.9 cm (6')  Weight: 86.8 kg (191 lb 6.4 oz)  SpO2: 99 %  Physical Exam  Constitutional:       General: He is not in acute distress.     Appearance: Normal appearance. He is normal weight. He is not ill-appearing, toxic-appearing or diaphoretic.   Cardiovascular:      Rate and Rhythm: Normal rate and regular rhythm.   Pulmonary:      Effort: Pulmonary effort is normal. No respiratory distress.      Breath sounds: Normal  breath sounds.   Abdominal:      General: Bowel sounds are normal.      Palpations: Abdomen is soft.   Musculoskeletal:         General: Normal range of motion.   Skin:     General: Skin is warm.      Capillary Refill: Capillary refill takes less than 2 seconds.   Neurological:      Mental Status: He is alert and oriented to person, place, and time. Mental status is at baseline.   Psychiatric:         Mood and Affect: Mood normal.         Behavior: Behavior normal.           ED Course, Procedures, & Data      Procedures                Results for orders placed or performed during the hospital encounter of 07/29/25   Urine Drug Screen Panel   Result Value Ref Range    Amphetamines Urine Screen Negative Screen Negative    Barbituates Urine Screen Negative Screen Negative    Benzodiazepine Urine Screen Negative Screen Negative    Cannabinoids Urine Screen Negative Screen Negative    Cocaine Urine Screen Negative Screen Negative    Fentanyl Qual Urine Screen Negative Screen Negative    Opiates Urine Screen Negative Screen Negative    PCP Urine Screen Negative Screen Negative     Medications   nicotine polacrilex (NICORETTE) gum 4 mg (has no administration in time range)   hydrOXYzine HCl (ATARAX) tablet 25 mg (has no administration in time range)   lurasidone (LATUDA) tablet 20 mg (20 mg Oral $Given 7/29/25 1637)   QUEtiapine (SEROquel) tablet 100 mg (has no administration in time range)   amphetamine-dextroamphetamine (ADDERALL XR) ER cap 30 mg (has no administration in time range)   amphetamine-dextroamphetamine (ADDERALL) per tablet 10 mg (has no administration in time range)   LORazepam (ATIVAN) tablet 0.5 mg (0.5 mg Oral $Given 7/29/25 1222)          Critical care was not performed.     Medical Decision Making  The patient's presentation was of high complexity (an acute health issue posing potential threat to life or bodily function).    The patient's evaluation involved:  review of external note(s) from 1 sources  (previous outpatient encounters)  ordering and/or review of 2 test(s) in this encounter (DEC assessment, UDS)  discussion of management or test interpretation with another health professional (DEC )    The patient's management necessitated high risk (a decision regarding hospitalization).    Assessment & Plan    Fuentes is a 40-year-old male that presents to the ED with concerns of worsening suicidal ideation and depression refractory to his medications.  Stable vital signs on presentation.  Patient in no acute distress and nontoxic-appearing throughout the ED visit.  No medical complaints during time of assessment or throughout his ED time.  No adventitious lung sounds on auscultation.  No cold or flu symptoms.  No self-harm attempts or injuries.  DEC assessment completed and recommended inpatient mental health admission.  ED provider also recommending this so agreeable to the plan.  DEC  gave clinical to behavioral intake.  ED mental health boarding order set was completed with psychiatry and extended care consults.  Patient's home medications were reordered as well as as needed medications for anxiety and agitation.  Patient is voluntary at this time but would hold if wanting to leave as he stated to the DEC  that he put a bag over his head yesterday and attempt to suffocate himself so he continues to pose a threat to himself unless he is able to safety plan otherwise with extended care/DEC on a reassessment.  Patient was agreeable to the inpatient admission plan, voiced understanding, and all questions were answered prior to end of shift and patient handoff to next ED provider.    I have reviewed the nursing notes. I have reviewed the findings, diagnosis, plan and need for follow up with the patient.    New Prescriptions    No medications on file       Final diagnoses:   Suicidal ideation     Jenna Lange PA-C    [facility] EMERGENCY DEPARTMENT  7/29/2025     Anisa  Jenna Cazares PA-C  07/29/25 [id number]

## 2025-07-29 NOTE — TELEPHONE ENCOUNTER
S: [facility] RITA Spencer  Summer calling at 1:33 PM about 40 year old/male presenting with SI w/plan to hang themselves.    B: Pt arrived via Self . Presenting problem, stressors: Has had SI throughout entire life but past month has been very intense with planning.    Pt affect in ED: Blunted and Tearful  Pt Dx: Major Depressive Disorder, Generalized Anxiety Disorder, and ADHD hx of Bipolar  Previous [facility] hx? Yes: with [facility] in 2013 and in 2022 for overdose.   Pt endorses SI with a plan to Hang himself   Hx of suicide attempt? Yes: most recent was on Sunday he put bag on head and attempted overdose in 2022.  Pt has a remote hx of SIB via cutting last year  Pt denies HI   Pt denies hallucinations .   Pt RARS Score: 3    Hx of aggression/violence, sexual offenses, legal concerns, Epic care plan? describe: none  Current concerns for aggression this visit? No  Does pt have a history of Civil Commitment? No  Is Pt their own guardian? Yes    Pt is prescribed medication. Is patient medication compliant? Pt recently decided to stop medications on their own  Pt endorses OP services: Medication Management  CD concerns: Pt is in recovery with a hx of etoh use occasional use of cannabis  Acute or chronic medical concerns: none  Does Pt present with specific needs, assistive devices, or exclusionary criteria? None      Pt is ambulatory  Pt is able to perform ADLs independently      A: Pt to be reviewed for [facility] admission. Pt is Voluntary  Preferred placement: [facility]    COVID Symptoms: No  If yes, COVID test required   Utox: Ordered, not yet collected   CMP: N/A  CBC: N/A  HCG: N/A    R: Patient cleared and ready for behavioral bed placement: Yes  Pt placed on [facility] worklist? Yes    Does Patient need a Transfer Center request created? No, Pt is located within [facility] ED, [facility] ED, or [facility] ED

## 2025-07-29 NOTE — TELEPHONE ENCOUNTER
R:  [place]  Access Inpatient Bed Call Log 7/29/2025 8:11:43 AM  Intake has called facilities that have not updated the bed status within the last 12 hours.         (Adults);        [facility]:                [facility] is posting 0 beds.             [facility] is posting 0 beds. [phone]: 8:22AM PER [facility] EM, AT [facility].   [facility] is posting 0 beds. Negative covid required.   [facility] is posting 0 beds. Neg covid. No [facility]. [phone] 8:21AM PER EMMY, AT [facility].  [facility] is posting 0 beds. [phone]   [facility] is posting 0 bed. [phone]    [facility] is posting 6 beds. (Ages 18-35) Negative covid. [phone] 8:11AM PER KARIME, BEDS AVAILABLE.  [facility] is posting 0 beds.    [facility] is posting 0 beds [phone]     2:21 PM Called Unit 10, per call with CRN; MRN Provided for review.

## 2025-07-29 NOTE — CONSULTS
Fuentes Zamora MRN# [id number]   Age: 40 year old YOB: 1985   Date of Admission to ED: 7/29/2025    In person visit Details:     Patient was assessed and interviewed face-to-face in person with this writer   Assessment methods included conducting a formal interview with patient, review of medical records, collaboration with medical staff, and obtaining relevant collateral information from family and community providers when available.    JEEVAN Gonzalez CNP            Contacts:     Attending Physician: Homero Go*     Current Outpatient Psychiatrist: PCP   Primary Care Provider: Washington Wynn  Family/friend: Father: Gadiel Zamora  [phone]       ED CC:      Chief Complaint   Patient presents with    Suicidal     Pt states that he feels like he is going to harm himself. Pt does not trust himself alone and wants help, has tried medications but nothing has worked.                History of Present Illness:     Patient presented to the ED on 7/29/2025 for SI, in the context of increased depression and anxiety, especially over the last month.      Interview with patient:   Fuentes reports he is here for SI thoughts.  He has been feeling sad.  Rates depression 10/10, anxiety 8/10, anger 0/10 with 10 as the worst.  He continues to have SI with intent, but notes he feels safe here because he knows he cannot do anything.  He reports lately he has been sleeping more than normal but there are periods where he doesn't need sleep for up to 3 days.  He eats if he is taking quetiapine for sleep but otherwise he does not have an appetite. He reports feeling hopeless and helpless.  He reports his thinking is different when he is depressed but was unable to explain how it is different.  He reports feeling anxious and worrying about everything.  He worries a lot about getting old and not accomplishing anything.  He has a son he has not talked to in 1.5 years.  He reports his son has  been refusing to talk to him.  His son's mom has full custody of his son.      He reports he is taking Adderall for ADHD and quetiapine for sleep.  He denies taking anything for depression at this time. Most recently he took Abilify and Prozac for depression but they were not helpful.  Due to the patient's possible bipolar symptoms, explained it is not standard of care to start a antidepressant.   Discussed neuroleptic mediations in general: the purpose is to treat mood stabilization and/or psychosis.  Discussed side effects: sedation, metabolic syndrome, including weight gain, elevated glucose, elevated cholesterol and associated complications, increased prolactin levels and gynecomastia, dystonia, akathisia, TD, and NMS. Discussed the SE that are typical for Latuda, sedation and akithisia.  Discussed need to take Latuda with a minimum of 350 calories, usually dosed at dinner time or bedtime with a snack due to sedation.  Discussed lamotrigine for bipolar depression and the need for a slow taper. 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 Discussed the need to re-titrate if she misses more than 3 days. Fuentes opted to try Latuda first since the response would be known sooner than if started lamotrigine due to the slow taper of lamotrigine.     Per DEC: anxious,  thoughts of death/suicide, sadness, hopelessness, helplessness, difficulty concentrating, withdrawl/isolation, crying or feels like crying, negativistic, loss of appetite.  Current primary symptoms include hopeless, SI, depressed, neurovegetative symptoms, sleep issues, cognitive distortions, poor ADLs, worthless and decreased functioning.  Substance use is relevant for hx of alcohol use (stopped using in the Spring). UDS in ED is negative    Collateral information from the famly/friend: none         Collateral information from chart review:     Reviewed DEC assessment and ED notes.     Per DEC  "assessment completed on 7/29/2025:   Pt reports depression, suicidal ideation with plan and intent to hang himself.  He reports his mother talking him into come to the ED.  The reports tell her this is his last shot..    History of the Crisis   Pt reports long history of suicidal ideation on/off through his life. He reports serious thoughts of ending it have been present about a month. Pt reports he came in at the urging of his mom. \"I never want to come.\" He states they talk frequently. Pt states nothing has worked to help his depression and anxiety. He has tried different medications. Pt speaks in a very hopeless manner, and not being able to see anything change for him. He reports the symptom took over and he does  not remember how to be his old self. Pt states he can not do anything; low energy/motivation. Personal cares have gone down. He showered before coming today and he has been going a week without showering. Pt states he is either in bed for days or stays awake for days. Pt states sometime he takes an extra seroquel to stay in bed and away from it all. Pt is teary at times and states it is the first he has cried in a long time. Pt reports poor appetite and isolating himself. I reports constand thoughts of suicide, different ways he could to it and the potential pain level. He reports current plan and intent to hang himself. He reports 2 days ago (Sunday) he took an extra seroquel, tied a plastic bag over his head to kill himself. He states he woke up with it off.  Pt has a hx of prior attempt by overdose. Pt reports SIB, cutting, last was a year ago. Denies HI, hallucinations. He reports hx of a problem with drinking. He denies current use of alcohol, last use was at [employer]. Pt reports occasional  use of cannabis and identifies use of an edible about a week ago. Pt reports vaping nicotine \"all day\". Pt states he is worried he will never feel like his old self again. \"If there was an easy " "way to end it, I'd take it.\" Pt states reports family hx of depression, anxiety and substance use. He reports his sister did ECT which he shares sounds scary.  He has lost a couple close friends to suicide, one was 2 years ago. Pt reports he stopped working due to his mental health/functioning.  He lost his apartment two weeks ago due to not working.  He is currently staying at an Extended Stay hotel. Pt reports one inpt hospitalization about 10 years ago at [facility]. He denies PHP/day treatment or CD tx history. Pt reports hx of therapy about 2 years ago.  Pt states his primary care provider is managing his medications currently. Review of the record indicates hx of bipolar and alcohol abuse, hx of intentional overdose. Pt is agreeable to inpatient admission. \"I don't feel right.\"               Psychiatric History:     As documented in HPI, Impression, collateral information from chart review & family/friend/guardian, DEC assessment and as listed below (not exhaustive).    Past Psychiatric Diagnosis:     Per EHR: social phobia, YANA, panic, persistent depressive disorder  Per DEC: Depression, Anxiety Disorder, ADHD, Bipolar Disorder, Substance Use Disorder     Past Medication Trials:     Venlafaxine   Sertraline - not helpful  Prozac - not helpful  Trazodone - did not like  Abilify - not helpful  Citalopram  Diazepam  Quetiapine   Bupropion  Buspirone  Paroxetine      Other Psychiatric History:     Per DEC: Past treatment:  Individual therapy, Psychiatric Medication Management, Inpatient Hospitalization, Primary Care  Details of most recent treatment:  Pt reports his primary care provider, Dr Wynn, manages pt's medications.  Other relevant history:  Pt reports he was born and raised in [place].  Parents currently live in [place].  Sister lives in [place].  Pt has never been . He has one 15 yo child.  Pt reports his poor mental health led to him no longer working.  Due to not working anymore, pt lost his apartment " "two weeks ago.  He is currently staying in an Extended Stay hotel.      Trauma/Abuse/Loss history: Patient reports 2 friends completed suicide, another friend  in an accident.           Substance Use History:     As documented in HPI, Impression, collateral information from chart review & family/friend/guardian, DEC assessment and as listed below (not exhaustive).    Substance Use:     UDS in ED was negative.     Substance use history includes: alcohol    Per Dr Ferguson discharge summary 10/23/2013:   \"He has also abused prescription drugs by stealing them from his sister and mother.\"         Past Medical and Surgical History:     PAST MEDICAL HISTORY:   Past Medical History:   Diagnosis Date    Anxiety     Depressive disorder     anxiety    Major depressive disorder with single episode     hospitalized for SI    Other acne            PAST SURGICAL HISTORY:   Past Surgical History:   Procedure Laterality Date    HC TOOTH EXTRACTION W/FORCEP      Under general anesthesia               Social History:     SOCIAL HISTORY:   Social History     Tobacco Use    Smoking status: Every Day     Types: Vaping Device    Smokeless tobacco: Never   Substance Use Topics    Alcohol use: Not Currently     Comment: Variable       Social Hx:  Living situation: currently living in an extended care hotel  Highest level of education: some college  Employment status: unemployed - lost job due to not functioning  Financial stressors: yes  Family/Friends/Support ppl: Mom  Legal Issues: Hx of DANCO and DANCO violations            Family History:     As documented in HPI, Impression, collateral information from chart review & family/friend/guardian, DEC assessment and as listed below (not exhaustive).    FAMILY HISTORY:   Family History   Problem Relation Age of Onset    Cancer Mother         Breast cancer age 52    Depression Mother     Anxiety Disorder Mother     Substance Abuse Father     Substance Abuse Sister     Bipolar Disorder " "Sister     Anxiety Disorder Sister     Depression Sister     Thrombocytopenia Son         2 month hospital stay, now off meds       Family psychiatric/mental health history includes: see above            Allergies and Medications:     No Known Allergies    Medications:     Current Facility-Administered Medications   Medication Dose Route Frequency Provider Last Rate Last Admin    hydrOXYzine HCl (ATARAX) tablet 25 mg  25 mg Oral Q4H PRN Jenna Lange PA-C        lurasidone (LATUDA) tablet 20 mg  20 mg Oral Daily with supper Melody Kraus APRN CNP   20 mg at 07/29/25 1637    nicotine polacrilex (NICORETTE) gum 4 mg  4 mg Buccal Q1H PRN Jenna Lange PA-C        QUEtiapine (SEROquel) tablet 100 mg  100 mg Oral At Bedtime Melody Kraus APRN CNP         Current Outpatient Medications   Medication Sig Dispense Refill    amphetamine-dextroamphetamine (ADDERALL XR) 30 MG 24 hr capsule Take 1 capsule (30 mg) by mouth daily. 30 capsule 0    amphetamine-dextroamphetamine (ADDERALL) 20 MG tablet Take 0.5 tablets (10 mg) by mouth 2 times daily. 30 tablet 0    QUEtiapine (SEROQUEL) 100 MG tablet Take 1 tablet (100 mg) by mouth at bedtime. 30 tablet 1           Mental Status Exam:     Appearance:  awake, alert, unkempt, disheveled , and untidy, body odor  Attitude:  guarded  Eye Contact:  limited  Mood:  \"sad\"  Affect:  intensity is blunted  Speech:  clear, coherent  Psychomotor Behavior:  no evidence of tardive dyskinesia, dystonia, or tics, fidgeting, and swinging his feet back and forth.   Thought Process:  linear  Associations:  no loose associations  Thought Content:  no evidence of psychotic thought and active suicidal ideation present, has a plan to hang himself, feeling more safe in the hospital.   Insight:  fair  Judgment:  fair  Oriented to:  time, person, and place  Attention Span and Concentration:  intact  Recent and Remote Memory:  intact  Fund of Knowledge: low-normal  Muscle Strength and " "Tone: normal  Gait and Station: not observed.            Physical Exam:     /73   Pulse 108   Temp 98.6  F (37  C) (Oral)   Resp 17   Ht 1.829 m (6')   Wt 86.8 kg (191 lb 6.4 oz)   SpO2 99%   BMI 25.96 kg/m      Weight is 191 lbs 6.4 oz 6' 0\" Body mass index is 25.96 kg/m .      Laboratory/Imaging:    Recent Results (from the past 72 hours)   Urine Drug Screen Panel    Collection Time: 25  2:40 PM   Result Value Ref Range    Amphetamines Urine Screen Negative Screen Negative    Barbituates Urine Screen Negative Screen Negative    Benzodiazepine Urine Screen Negative Screen Negative    Cannabinoids Urine Screen Negative Screen Negative    Cocaine Urine Screen Negative Screen Negative    Fentanyl Qual Urine Screen Negative Screen Negative    Opiates Urine Screen Negative Screen Negative    PCP Urine Screen Negative Screen Negative       ROS: 10 point ROS neg other than the symptoms noted above in the HPI.     I have reviewed the physical done by the ED provider on 2025. Any notable changes include: are included in the HPI.            Impression:     This patient is a 40 year old year old  male with a past psychiatric history of  Depression, Anxiety Disorder, ADHD, Bipolar Disorder, Substance Use Disorder, YANA, panic, persistent depressive disorder and social phobia, who presented to the ED on 2025  for SI with a plan to hang himself.  Prior to presenting to the ED the patient reports he has been depressed for about 1.5 years.  He reports his son has been refusing to talk to him for about 1.5 years.  He also has lost 2 friends to suicide and another  from an accident.  .      At this time, he continues to endorse SI.  He is willing to take medication.  He reports he is taking Adderall for ADHD and seroquel for sleep.  He denies taking anything for his mood.  He endorses some periods of not needing sleep (up to 3 days at a time) and other times when he is unable to get out of " bed.  His ADLs are poor - smells of body odor.  He lost his job due to not functioning d/t poor MH and lost his apartment due to not able to pay the bills. He is agreeable to try Latuda for mood stabilization.  Also discussed lamotrigine, chose to try Latuda first because as it will be known sooner if it is helpful since lamotrigine has such a slow taper.     Significant symptoms are noted in the HPI. There is genetic loading for mood and anxiety.  Medical history does not appear to be significant.  Substance use does not appear to be playing a contributing role in the patient's presentation. However, Fuentes does have a history of AUD and other substance use.  Major stressors are loss and chronic mental health issues.  Patient appears to cope with stress/frustration/emotion by withdrawing and acting out to self.  Patient's support system includes family and outpatient team. Protective factors: family and engaged in treatment. Risk factors: SI, maladaptive coping, past behaviors, and loss. Patient Strengths: help seeking, engaging with ED treatment team, and willing to be admitted and take medications.     Based on interview with patient, record review, conversations ED staff, P staff and ED attending, the patient meets criteria for unspecified mood disorder, MDD vs bipolar depression.  Current medications are listed above.  Recommended medication adjustments are listed below.  Acute inpatient stabilization is needed as indicated by ongoing SI with a plan to hang himself .  Other recommendations are listed below.    Risk for harm is elevated.    Brief Therapeutic Intervention(s):   Provided rappport building, active listening, unconditional positive regard, and validation.    Legal Status: Voluntary           Diagnoses:     Principal Diagnosis: unspecified mood disorder, MDD vs bipolar depression    Secondary psychiatric diagnoses of concern this admission:  YANA  Social phobia by hx  Panic by hx  ADHD by  hx    Current medical diagnosis being treated:   none         Recommendations:     Communicated the case and writer's recommendations with Dr Homero Go MD, ED provider, via face to face conversation, writer asked if writer should enter orders in the EHR, the ED provider agreed.    Recommend acute inpatient stabilization  2.   Recommend starting Latuda 20 mg hs for depression, possibly bipolar depression  3.   Continue:    Seroquel 100 mg hs for sleep and anxiety  4.   Hold: (inpatient team to decide if needed while IP)   Adderall XR 30 mg daily   Adderall 5 mg bid   5.   Continue to consult psychiatry as needed.    Please call [facility]/DEC at [phone] if you have follow-up questions or wish to place another consult.         Attestation       Attestation:  Patient time: 20 minutes  Reviewed labs, EKG, and relevant imagin minutes  Family/guardian/other time: 0 minutes  Team time:30 minutes  Chart review: 60 minutes  Documentation: 45  minutes  Total time: 155 minutes spent on the date of the encounter.    I have provided critical care services at the bedside in the [facility] adult emergency department, evaluating the patient, reviewing notes and laboratory values and directing care. I have discussed recommendation regarding whether or not hospitalization is needed and recommendations for medications and laboratory testing with the attending emergency department provider. Melody Dailey, DNP, APRN, CNP 2025.    Disclaimer: This note consists of symbols derived from keyboarding, dictation, and/or voice recognition software. As a result, there may be errors in the script that have gone undetected.  Please consider this when interpreting information found in the chart.

## 2025-07-29 NOTE — TELEPHONE ENCOUNTER
R: 5:16pm- ANGEL Crow on 10 reports that they reviewed and can accept Pt once UTOX is completed.  UTOX is resulted.  Will page provider to accept and place in queue.     5:20pm- Paged Dr. Sams.    6:30pm- Dr. Sams accepts for 10/Balta/Lillie.     10 CRN notified that Pt is in queue.     ED RN notified with placement and that they can call for report.     Indicia is completed.

## 2025-07-29 NOTE — PLAN OF CARE
Fuentes Zamora  July 29, 2025  Plan of Care Hand-off Note     Patient Recommended Care Path: inpatient mental health    Clinical Substantiation:  Pt presents to the ED with suicidal ideation, plan, and intent to hang himself. Pt reports medication trials have not helped, nothing has helped and he can't go on living this way.  Plan for intpatient admission.    Goals for crisis stabilization:  symptom reduction, safety    Next steps for Care Team:  Extended Care to follow while awaiting admission.  psych consult    Treatment Objectives Addressed:  rapport building, orienting the patient to therapy, processing feelings, assessing safety, identifying treatment goals    Therapeutic Interventions:  Engaged in guided discovery, explored patient's perspectives and helped expand them through socratic dialogue.    Has a specific means been identified for suicidal.homicide actions: Yes  If yes, describe: Pt reports he has been thinking of different ways he could do it, hang himself, drugs, overdose.  He identifies he would hang himself.  Explain action steps toward mitigation: plan for inpt admission  Document completion of mitigation action: pt placed on waiting list for admission.  The follow up action still needed prior to discharge: safety planning and follow up resources    Patient coping skills attempted to reduce the crisis:  Working with primary care provider on medications.  Speaking with family       Imminent risk of harm: Suicidal Behavior  Severe psychiatric, behavioral or other comorbid conditions are appropriate for management at inpatient mental health as indicated by at least one of the following: Psychiatric Symptoms  Severe dysfunction in daily living is present as indicated by at least one of the following: Extreme deterioration in social interactions, Complete inability to maintain any appropriate aspect of personal responsibility in any adult roles  Situation and expectations are appropriate for inpatient  care: Voluntary treatment at lower level of care is not feasible  Inpatient mental health services are necessary to meet patient needs and at least one of the following: Specific condition related to admission diagnosis is present and judged likely to further improve at proposed level of care, Specific condition related to admission diagnosis is present and judged likely to deteriorate in absence of treatment at proposed level of care, Patient is receiving continuing care (eg, transition of care from less intensive level of care)      Collateral contact information:   attempted reach patient's parents      Legal Status: Voluntary/Patient has signed consent for treatment                                                                                                                                 Reviewed court records: yes     Psychiatry Consult: Patient has Psychiatry Consult Order    MIRI NashSW
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
## 2025-07-30 LAB
ALBUMIN SERPL BCG-MCNC: 4.3 G/DL (ref 3.5–5.2)
ALP SERPL-CCNC: 62 U/L (ref 40–150)
ALT SERPL W P-5'-P-CCNC: 53 U/L (ref 0–70)
ANION GAP SERPL CALCULATED.3IONS-SCNC: 17 MMOL/L (ref 7–15)
AST SERPL W P-5'-P-CCNC: 43 U/L (ref 0–45)
ATRIAL RATE - MUSE: 98 BPM
BILIRUB SERPL-MCNC: 0.5 MG/DL
BUN SERPL-MCNC: 14.2 MG/DL (ref 6–20)
CALCIUM SERPL-MCNC: 9.2 MG/DL (ref 8.8–10.4)
CHLORIDE SERPL-SCNC: 103 MMOL/L (ref 98–107)
CHOLEST SERPL-MCNC: 207 MG/DL
CREAT SERPL-MCNC: 0.86 MG/DL (ref 0.67–1.17)
DIASTOLIC BLOOD PRESSURE - MUSE: NORMAL MMHG
EGFRCR SERPLBLD CKD-EPI 2021: >90 ML/MIN/1.73M2
ERYTHROCYTE [DISTWIDTH] IN BLOOD BY AUTOMATED COUNT: 12.9 % (ref 10–15)
EST. AVERAGE GLUCOSE BLD GHB EST-MCNC: 108 MG/DL
GLUCOSE SERPL-MCNC: 112 MG/DL (ref 70–99)
HBA1C MFR BLD: 5.4 %
HCO3 SERPL-SCNC: 21 MMOL/L (ref 22–29)
HCT VFR BLD AUTO: 43.3 % (ref 40–53)
HDLC SERPL-MCNC: 49 MG/DL
HGB BLD-MCNC: 14.4 G/DL (ref 13.3–17.7)
INTERPRETATION ECG - MUSE: NORMAL
LDLC SERPL CALC-MCNC: 134 MG/DL
MCH RBC QN AUTO: 29.8 PG (ref 26.5–33)
MCHC RBC AUTO-ENTMCNC: 33.3 G/DL (ref 31.5–36.5)
MCV RBC AUTO: 90 FL (ref 78–100)
NONHDLC SERPL-MCNC: 158 MG/DL
P AXIS - MUSE: 47 DEGREES
PLATELET # BLD AUTO: 227 10E3/UL (ref 150–450)
POTASSIUM SERPL-SCNC: 4.3 MMOL/L (ref 3.4–5.3)
PR INTERVAL - MUSE: 152 MS
PROT SERPL-MCNC: 6.8 G/DL (ref 6.4–8.3)
QRS DURATION - MUSE: 94 MS
QT - MUSE: 338 MS
QTC - MUSE: 431 MS
R AXIS - MUSE: 64 DEGREES
RBC # BLD AUTO: 4.83 10E6/UL (ref 4.4–5.9)
SODIUM SERPL-SCNC: 141 MMOL/L (ref 135–145)
SYSTOLIC BLOOD PRESSURE - MUSE: NORMAL MMHG
T AXIS - MUSE: 36 DEGREES
TRIGL SERPL-MCNC: 122 MG/DL
VENTRICULAR RATE- MUSE: 98 BPM
VIT B12 SERPL-MCNC: 365 PG/ML (ref 232–1245)
VIT D+METAB SERPL-MCNC: 22 NG/ML (ref 20–50)
WBC # BLD AUTO: 7.2 10E3/UL (ref 4–11)

## 2025-07-30 PROCEDURE — 99222 1ST HOSP IP/OBS MODERATE 55: CPT | Mod: AI

## 2025-07-30 PROCEDURE — 80061 LIPID PANEL: CPT | Performed by: PSYCHIATRY & NEUROLOGY

## 2025-07-30 PROCEDURE — 85027 COMPLETE CBC AUTOMATED: CPT

## 2025-07-30 PROCEDURE — 83036 HEMOGLOBIN GLYCOSYLATED A1C: CPT | Performed by: PSYCHIATRY & NEUROLOGY

## 2025-07-30 PROCEDURE — 36415 COLL VENOUS BLD VENIPUNCTURE: CPT | Performed by: PSYCHIATRY & NEUROLOGY

## 2025-07-30 PROCEDURE — 82306 VITAMIN D 25 HYDROXY: CPT

## 2025-07-30 PROCEDURE — 80053 COMPREHEN METABOLIC PANEL: CPT

## 2025-07-30 PROCEDURE — 250N000013 HC RX MED GY IP 250 OP 250 PS 637: Performed by: NURSE PRACTITIONER

## 2025-07-30 PROCEDURE — 97150 GROUP THERAPEUTIC PROCEDURES: CPT | Mod: GO

## 2025-07-30 PROCEDURE — 90853 GROUP PSYCHOTHERAPY: CPT

## 2025-07-30 PROCEDURE — 93005 ELECTROCARDIOGRAM TRACING: CPT

## 2025-07-30 PROCEDURE — 124N000002 HC R&B MH UMMC

## 2025-07-30 PROCEDURE — 82465 ASSAY BLD/SERUM CHOLESTEROL: CPT | Performed by: PSYCHIATRY & NEUROLOGY

## 2025-07-30 PROCEDURE — 250N000013 HC RX MED GY IP 250 OP 250 PS 637

## 2025-07-30 PROCEDURE — 82607 VITAMIN B-12: CPT

## 2025-07-30 RX ORDER — DEXTROAMPHETAMINE SACCHARATE, AMPHETAMINE ASPARTATE, DEXTROAMPHETAMINE SULFATE AND AMPHETAMINE SULFATE 1.25; 1.25; 1.25; 1.25 MG/1; MG/1; MG/1; MG/1
5 TABLET ORAL 2 TIMES DAILY
Refills: 0 | Status: DISPENSED | OUTPATIENT
Start: 2025-07-30

## 2025-07-30 RX ORDER — NICOTINE 21 MG/24HR
1 PATCH, TRANSDERMAL 24 HOURS TRANSDERMAL DAILY
Status: DISPENSED | OUTPATIENT
Start: 2025-07-30

## 2025-07-30 RX ORDER — QUETIAPINE FUMARATE 25 MG/1
25-50 TABLET, FILM COATED ORAL 3 TIMES DAILY PRN
Status: DISPENSED | OUTPATIENT
Start: 2025-07-30

## 2025-07-30 RX ORDER — DEXTROAMPHETAMINE SACCHARATE, AMPHETAMINE ASPARTATE MONOHYDRATE, DEXTROAMPHETAMINE SULFATE AND AMPHETAMINE SULFATE 5; 5; 5; 5 MG/1; MG/1; MG/1; MG/1
20 CAPSULE, EXTENDED RELEASE ORAL DAILY
Refills: 0 | Status: DISPENSED | OUTPATIENT
Start: 2025-07-31

## 2025-07-30 RX ADMIN — DEXTROAMPHETAMINE SULFATE, DEXTROAMPHETAMINE SACCHARATE, AMPHETAMINE SULFATE AND AMPHETAMINE ASPARTATE 30 MG: 7.5; 7.5; 7.5; 7.5 CAPSULE, EXTENDED RELEASE ORAL at 07:57

## 2025-07-30 RX ADMIN — NICOTINE 1 PATCH: 21 PATCH, EXTENDED RELEASE TRANSDERMAL at 13:00

## 2025-07-30 RX ADMIN — QUETIAPINE FUMARATE 25 MG: 25 TABLET ORAL at 18:01

## 2025-07-30 RX ADMIN — DEXTROAMPHETAMINE SACCHARATE, AMPHETAMINE ASPARTATE, DEXTROAMPHETAMINE SULFATE AND AMPHETAMINE SULFATE 10 MG: 2.5; 2.5; 2.5; 2.5 TABLET ORAL at 07:57

## 2025-07-30 RX ADMIN — LURASIDONE HYDROCHLORIDE 20 MG: 20 TABLET, FILM COATED ORAL at 18:13

## 2025-07-30 RX ADMIN — HYDROXYZINE HYDROCHLORIDE 25 MG: 25 TABLET, FILM COATED ORAL at 17:04

## 2025-07-30 RX ADMIN — QUETIAPINE FUMARATE 100 MG: 100 TABLET ORAL at 21:05

## 2025-07-30 RX ADMIN — DEXTROAMPHETAMINE SACCHARATE, AMPHETAMINE ASPARTATE, DEXTROAMPHETAMINE SULFATE AND AMPHETAMINE SULFATE 5 MG: 1.25; 1.25; 1.25; 1.25 TABLET ORAL at 12:02

## 2025-07-30 ASSESSMENT — ACTIVITIES OF DAILY LIVING (ADL)
ADLS_ACUITY_SCORE: 15
HYGIENE/GROOMING: INDEPENDENT
ADLS_ACUITY_SCORE: 15
ORAL_HYGIENE: INDEPENDENT
ADLS_ACUITY_SCORE: 15
ORAL_HYGIENE: INDEPENDENT
HYGIENE/GROOMING: INDEPENDENT
ADLS_ACUITY_SCORE: 15
DRESS: INDEPENDENT
ADLS_ACUITY_SCORE: 15
DRESS: INDEPENDENT
ADLS_ACUITY_SCORE: 15
ADLS_ACUITY_SCORE: 15

## 2025-07-30 NOTE — PROGRESS NOTES
"CLINICAL NUTRITION SERVICES - ASSESSMENT NOTE    RECOMMENDATIONS FOR MDs/PROVIDERS TO ORDER:  Recommend Vitamin C supplementation r/t chronic vaping    Malnutrition Status:    Patient does not meet two of the established criteria necessary for diagnosing malnutrition    Registered Dietitian Interventions:  Provided education for healthy eating  Ensure Max daily    Future/Additional Recommendations:  RD to sign off and will remain available by consult if new nutrition concerns arise       REASON FOR ASSESSMENT  Provider order - Patient reports poor appetite and wants more healthy snack options and menu write ins    Pt really just wanted healthy eating tips to get rid of his \"belly\"  Discussed healthy eating using \"MyPlate.\" Pt satisfied with the education, all questions answered.    history of major depressive disorder, generalized anxiety disorder, panic disorder, ADHD, social phobias, prior deliberate medication overdose (2022) who presents to the emergency department with suicidal ideation.  Patient states that he has tried different psychiatric medications but they have not been helpful.  He states that his suicidal ideation has been becoming more prevalent and severe over the past several months.  He states that over the past couple days, he has been having thoughts of strangling himself via hanging or suffocating himself.  He states that if he would leave the ER today, he would likely attempt this.  He has been compliant with his medications but states that they do not seem to be helping his symptoms at all.  He is otherwise denying any hallucinations or HI.  His psychiatric medications have been managed by his primary care provider.  He does not have a psychologist, psychiatrist, or therapist.  He denies alcohol use, marijuana use, or illicit drug use otherwise.       INFORMATION OBTAINED  Assessed patient in room.    NUTRITION HISTORY  Pt reports two days of poor appetite/intakes.     CURRENT NUTRITION " "ORDERS  Diet: Regular  Snacks/Supplements: Ensure Max daily    Allergies: NKFA    CURRENT INTAKE/TOLERANCE  25% intakes per pt self report.     LABS  Nutrition-relevant labs:    07/30/25 07:31   Anion Gap 17 (H)     MEDICATIONS  Nutrition-relevant medications: Adderall, latuda, seroquel    ANTHROPOMETRICS  Height: 182.9 cm (6' 0\")  Admission Weight: 86.8 kg (191 lb 6.4 oz) (07/29/25 1144)   Most Recent Weight: 86.8 kg (191 lb 6.4 oz) (07/29/25 1144)  IBW: 80.9 kg   BMI: Body mass index is 25.96 kg/m .   Weight History:   Wt Readings from Last 5 Encounters:   07/31/25 84.5 kg (186 lb 4.8 oz)   10/21/24 84.6 kg (186 lb 8 oz)   12/27/22 80.3 kg (177 lb)   11/07/22 79.4 kg (175 lb)   01/07/17 70.3 kg (155 lb)       Dosing Weight: 86.8 kg, based on actual wt    ASSESSED NUTRITION NEEDS  Estimated Energy Needs: 2170 - 2605 kcals/day (25 - 30 kcals/kg)  Justification: Maintenance  Estimated Protein Needs: 69 - 87 grams protein/day (0.8 - 1 grams of pro/kg)  Justification: Maintenance  Estimated Fluid Needs: 2170 - 2605 mL/day (1 mL/kcal)  Justification: Maintenance    SYSTEM AND PHYSICAL FINDINGS    GI symptoms: None  Skin/wounds: None    MALNUTRITION  % Intake: Decreased intake does not meet criteria  % Weight Loss: None noted  Subcutaneous Fat Loss: None observed  Muscle Loss: None observed  Fluid Accumulation/Edema: None noted  Malnutrition Diagnosis: Patient does not meet two of the established criteria necessary for diagnosing malnutrition  Malnutrition Present on Admission: No    NUTRITION DIAGNOSIS  No nutrition diagnosis at this time     INTERVENTIONS  Discussed intervention/plan with patient and/or family.  Medical food supplement therapy  Vitamin and mineral supplement therapy    GOALS  Patient to consume % of nutritionally adequate meal trays TID, or the equivalent with supplements/snacks.     MONITORING/EVALUATION  Progress toward goals will be monitored and evaluated per policy.    Leslie Finn RDN " MARIA FERNANDA  Behavioral Health Adult & Pediatric Dietitian  [facility] Clinical Dietitian Deon Nance, or Desk: [phone]  Weekend/Holiday [place] Ann: Weekend Holiday Clinical Dietitian [Multi Site Groups]

## 2025-07-30 NOTE — PLAN OF CARE
" INITIAL PSYCHOSOCIAL ASSESSMENT AND NOTE    Information for assessment was obtained from:       [x]Patient     []Parent     []Community provider    [x]Hospital records   []Other     []Guardian       Presenting Problem:  Patient is a 40 year old male who uses he/him who presented to ED on 7/29/25 by self aft. Patient was admitted to [facility] voluntarily on 7/30/2025.    Presenting issues and presentation for admit:   ED DECCA:  Referral Data and Chief Complaint  Fuentes Zamora presents to the ED by  self. Patient is presenting to the ED for the following concerns: Worsening psychosocial stress, Depression, Suicidal ideation, Suicide attempt, Anxiety. Factors that make the mental health crisis life threatening or complex are: Pt reports depression, suicidal ideation with plan and intent to hang himself.  He reports his mother talking him into come to the ED.  The reports tell her this is his last shot.  History of the Crisis   Pt reports long history of suicidal ideation on/off through his life. He reports serious thoughts of ending it have been present about a month. Pt reports he came in at the urging of his mom. \"I never want to come.\" He states they talk frequently. Pt states nothing has worked to help his depression and anxiety. He has tried different medications. Pt speaks in a very hopeless manner, and not being able to see anything change for him. He reports the symptom took over and he does  not remember how to be his old self. Pt states he can not do anything; low energy/motivation. Personal cares have gone down. He showered before coming today and he has been going a week without showering. Pt states he is either in bed for days or stays awake for days. Pt states sometime he takes an extra seroquel to stay in bed and away from it all. Pt is teary at times and states it is the first he has cried in a long time. Pt reports poor appetite and " "isolating himself. I reports constand thoughts of suicide, different ways he could to it and the potential pain level. He reports current plan and intent to hang himself. He reports 2 days ago (Sunday) he took an extra seroquel, tied a plastic bag over his head to kill himself. He states he woke up with it off.  Pt has a hx of prior attempt by overdose. Pt reports SIB, cutting, last was a year ago. Denies HI, hallucinations. He reports hx of a problem with drinking. He denies current use of alcohol, last use was at [employer] [facility] [employer]. Pt reports occasional  use of cannabis and identifies use of an edible about a week ago. Pt reports vaping nicotine \"all day\". Pt states he is worried he will never feel like his old self again. \"If there was an easy way to end it, I'd take it.\" Pt states reports family hx of depression, anxiety and substance use. He reports his sister did ECT which he shares sounds scary.  He has lost a couple close friends to suicide, one was 2 years ago. Pt reports he stopped working due to his mental health/functioning.  He lost his apartment two weeks ago due to not working.  He is currently staying at an Extended Stay hotel. Pt reports one inpt hospitalization about 10 years ago at [facility]. He denies PHP/day treatment or CD tx history. Pt reports hx of therapy about 2 years ago.  Pt states his primary care provider is managing his medications currently. Review of the record indicates hx of bipolar and alcohol abuse, hx of intentional overdose. Pt is agreeable to inpatient admission. \"I don't feel right.\"      The following areas have been assessed:    History of Mental Health and Chemical Dependency:  Mental Health History:  Patient has a historical diagnosis of bipolar and alcohol abuse, hx of intentional overdose.. The patient has attempted suicide (overdosing and suffocating himself) and has engaged in non-suicidal self-injury (NSSI) (cutting, 1 year ago). He has engaged in mental health " services therapy, psychiatry, inpatient, DBT programming. He has not been under commitment before.    Previous psychiatric hospitalizations:   11/2022 in [place] after an intentional overdose  2013 [facility] due to SI  2011 hospitalization as well     Substance Use History  Reports recreational use of THC  History of being heavily intoxicate with alcohol and coming to hospital  Family historically reported history of abusing prescription pills, stealing from his sister      Patient's current relationship status is   Single, has two children. Not in his custody.    Son has not talked to him in 1.5 years  Records indicate he may have had, or has a daughter as well.     Family Description (Constellation, significant information and events, Family Psychiatric History):  Pt reports he was born and raised in [place].  Parents currently live in [place].  Sister lives in [place].  Pt has never been . He has one 15 yo child.  Pt reports his poor mental health led to him no longer working.  Due to not working anymore, pt lost his apartment two weeks ago.  He is currently staying in an Extended Stay hotel.    Cancer Mother       Breast cancer age 52   Depression Mother   Anxiety Disorder Mother   Substance Abuse Father   Substance Abuse Sister   Bipolar Disorder Sister   Anxiety Disorder Sister   Depression Sister   Thrombocytopenia Son       Significant Life Events or Trauma history:   Misses his son  Suicide attempts      Living Situation:  Patient's current living/housing situation is staying in an extended stay hotel. They live alone and they report that housing is not stable and they may able to return upon discharge.       Educational Background:    Patient's highest education level was some college. Patient reports they are  able to understand written materials. 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  Patient reports  income is obtained  through employment.  Patient does identify finances as a current stressor. They are insured under [facility] [employer] AND [employer]     Occupational History: Construction work, working on a farm, film work    Legal Concerns (current or past history):       Current Concerns:      Past History: Hx of DANCO and DANCO violations       Service History: No    Ethnic/Cultural/Spiritual considerations:   The patient describes their cultural background as White/, heterosexual, male.  Contextual influences on patient's health include transportation, housing instability, lack of social support, and medication adherence concerns.   Patient identified their preferred language to be English. Patient reported they do not need the assistance of an .     Social Functioning (organizations, interests, support system):   Patient identified parents and siblings as part of their support system.  Patient identified the quality of these relationships as fair.       Current Treatment Providers are:  Current Outpatient Psychiatrist: KATHY   Primary Care Provider: Washington Wynn  Family/friend: Father: Gadiel Zamora  [phone]        GOALS FOR HOSPITALIZATION:  What do patient want to accomplish during this hospitalization to make things better for the patient.?   Patient priorities:  The patient reported that what is most important to them is getting his life back on track and making better choices. They identified getting their mind right as a goal of this hospitalization.    Social Service Assessment/Plan:  Patient view:   They anticipate being in the hospital for 2-7 days. Upon discharge, they anticipate needing psychiatry and IRTS set up for them.      Strengths and Assets:  In times of need, the patient reports they rely on family.      Patient will have psychiatric assessment and medication management by the psychiatrist. Medications will be reviewed and adjusted per /MD/APRN CNP  as indicated. The treatment team will continue to assess and stabilize the patient's mental health symptoms with the use of medications and therapeutic programming. Hospital staff will provide a safe environment and a therapeutic milieu. Staff will continue to assess patient as needed. Patient will participate in unit groups and activities. Patient will receive individual and group support on the unit.      CTC will do individual inpatient treatment planning and after care planning.   CTC will discuss options for increasing community supports with the patient.   CTC will coordinate with outpatient providers and will place referrals to ensure appropriate follow up care is in place.

## 2025-07-30 NOTE — PROGRESS NOTES
"Patient is a 40 year old male who presented to [facility] ED by self. According to ED RN and chart report; patient presented to the ED complaining of worsening depression, suicidal ideation, anxiety, and suicide attempt,with a plan and intent to hang self. Patient was admitted to station [address] [place] [address] at 1917. Patient was transported via wheel chair with all his belongings. Patient was compliant with search and admission assessment. Patient reports his mood is \"sad and anxious\". Patient denies pain. Patient endorses anxiety and depression rated 9/10. Patient denies self-injurious behaviors, suicidal, and homicidal ideations. Patient denies auditory, and visual hallucinations. Contracts for safety. Patient was compliant with scheduled medications. Patient requested prn trazodone for insomnia. Vitals were within normal limit.  "

## 2025-07-30 NOTE — PLAN OF CARE
Group Attendance:  attended partial group    Time session began: 1415  Time session ended: 1503  Patient's total time in group: 40    Total # Attendees   5   Group Type psychotherapeutic     Group Topic Covered insight improvement, relationships, and positive psychology       Group Session Detail Participants took turns telling their life stories through the lenses of past, present and future. A group discussion was facilitated to explore commonalities amongst group members.       Patient's response to the group topic/interactions:  actively engaged     Patient Details: Fuentes joined late but shared for all three prompts. He presented with negative thinking when sharing. He identified challenges with his relationships and mental health that have been impacting his wellbeing. His hope for the future is that he can learn to be alone and manage his mental health.          [id number] - Group psychotherapy - 1 Session  Patient Active Problem List   Diagnosis    Other acne    Social phobia    Panic disorder without agoraphobia    Major depressive disorder, single episode, moderate (H)    Generalized anxiety disorder    Depression    Attention deficit hyperactivity disorder, predominantly inattentive type    Displacement of lumbar intervertebral disc without myelopathy    Anxiety    Suicidal ideation    Bipolar 1 disorder (H)

## 2025-07-30 NOTE — H&P
"PSYCHIATRY   HISTORY AND PHYSICAL     DATE OF SERVICE   7/30/2025         CHIEF COMPLAINT   \" I could not go on living this way.\"       HISTORY OF PRESENT ILLNESS   This is a 40 year old male with history of depression, anxiety Disorder, ADHD, Bipolar Disorder, Substance Use Disorder, YANA, panic, persistent depressive disorder and social phobia who presented to [facility] ED on 7/29/2025 reporting SI.  In the ED, patient reported that he put a bag over his head yesterday as a suicide attempt by suffocation.  Urine drug screening completed in the ED and negative for all substances. Patient was evaluated by provider in the ED and determined to be medically stable.  Patient subsequently admitted voluntarily to inpatient psychiatric unit 10N with attending Dr. Gifford for further psychiatric stabilization.  Upon admission, patient placed on status 15 monitoring.  Patient also placed on precautions: Suicide precautions.    Direct care provided by: JEEVAN Chu CNP    Upon examination, patient's affect appears blunted.  Patient's mood appeared depressed.  Patient reports he admitted to the hospital because, \"I could not go on living this way.\"  Patient reports experiencing chronic depression as well as chronic passive SI which started in the sixth grade.  Patient endorses depression symptoms including: Severely depressed mood, low energy, low motivation, hopelessness, helplessness, guilt, anhedonia, low appetite, hypersomnia or insomnia, and difficulty concentrating.  Patient reports chronic SI became active over the past few months.  Patient reports prior to admission the hospital he was considering suicide attempt by overdose or by shooting himself however does not have gun access.  Per chart review, patient engaged in a suicide attempt of suffocation by putting a bag over his head prior to current admission.  Per chart review, patient has a history of engaging in SIB of cutting with last time approximately a year " "ago.  Patient currently denies active SI, SIB, SA, plan or intent.  Patient denies HI.  Patient is able to contract for safety.  Patient reports experiencing anxiety starting in the sixth grade and describes anxiety as, \" It controlled my life for a long time.\"  Patient does not identify with historical diagnosis of bipolar disorder despite this diagnosis documented in chart.  Despite denying previous diagnosis of bipolar disorder, patient does endorse in the past experiencing symptoms consistent with george for approximately a week at a time which include elevated mood, increased and goal directed activities, increased energy levels, decreased need for sleep, and engaging in substance use.  Patient reports the last time he experienced an episode of george was approximately a year ago and that he finds the symptoms enjoyable.  Patient reports a history of alcohol use disorder and that alcohol was a problem for him from the ages of [age] years old.  Patient reports he now only drinks alcohol once a month or less and describes alcohol use as, \"no more than 3 drinks.\"  Patient also reports a history of opiate use disorder and that he engaged in opiate use described as, \"pain pills,\" from the ages of [age] years old.  Patient reports detoxing off opiates independently and that he no longer engages in opiate use unless indicated by a medical professional.  Patient reports he does engage in occasional cannabis use described as, \"1 gummy once a month.\"  Patient also reports nicotine use described as, \"vape all day.\"  Patient reports caffeine use described as, \"1 soda per day.\"  Patient reports substance use is not an issue for him.  Patient denies any absence withdrawal symptoms and none observed. Patient reports he was diagnosed with ADHD 3 years ago after being tested for this.  Patient endorses ADHD symptoms including: Difficulty concentrating.  Patient denies any restlessness or difficulty sitting still.  Denies any " history of trauma.  Patient denies any eating disorder symptoms.  Patient does not endorse any symptoms consistent with OCD.  Patient reports using psychosocial stressors prior to admission the hospital including: He lost his job approximately 6 months ago and gave up his apartment approximately 2 weeks ago.  Patient reports they are now living in an extended stay hotel however that housing is not stable.  Patient reports that they have tried many psychiatric medications in the past and most have caused side effects or have been ineffective.  Patient is open to psychiatric medication management and also reports that he would potentially be open to an outpatient mental health program.  Patient reports he did DBT in the past however cannot recall if he found this helpful.  Patient reports he has tried working with an individual therapist in the past however did not find this helpful.    Per ED provider documentation on 7/29/2025:  Chief Complaint   Patient presents with    Suicidal       Pt states that he feels like he is going to harm himself. Pt does not trust himself alone and wants help, has tried medications but nothing has worked.       The history is provided by the patient and medical records.      Fuentes Zamora is a 40 year old male with history of major depressive disorder, generalized anxiety disorder, panic disorder, ADHD, social phobias, prior deliberate medication overdose (2022) who presents to the emergency department with suicidal ideation.  Patient states that he has tried different psychiatric medications but they have not been helpful.  He states that his suicidal ideation has been becoming more prevalent and severe over the past several months.  He states that over the past couple days, he has been having thoughts of strangling himself via hanging or suffocating himself.  He states that if he would leave the ER today, he would likely attempt this.  He has been compliant with his medications but  states that they do not seem to be helping his symptoms at all.  He is otherwise denying any hallucinations or HI.  His psychiatric medications have been managed by his primary care provider.  He does not have a psychologist, psychiatrist, or therapist.  He denies alcohol use, marijuana use, or illicit drug use otherwise.     Epic records reviewed, patient did have hospitalization from 11/28-11/29/2022 after suicide attempt via medication overdose.  At that time he had taken about 7 to 12 tablets of Flexeril along with half a liter of vodka.  He was seen at [facility] emergency department in [place]. Unfortunately he became hypotensive, ultimately required Levophed, IV fluids and monitoring on critical care floor.  He was admitted medically under [place] act    Medical Decision Making  The patient's presentation was of high complexity (an acute health issue posing potential threat to life or bodily function).     The patient's evaluation involved:  review of external note(s) from 1 sources (previous outpatient encounters)  ordering and/or review of 2 test(s) in this encounter (DEC assessment, UDS)  discussion of management or test interpretation with another health professional (DEC )     The patient's management necessitated high risk (a decision regarding hospitalization).     Assessment & Plan    Fuentes is a 40-year-old male that presents to the ED with concerns of worsening suicidal ideation and depression refractory to his medications.  Stable vital signs on presentation.  Patient in no acute distress and nontoxic-appearing throughout the ED visit.  No medical complaints during time of assessment or throughout his ED time.  No adventitious lung sounds on auscultation.  No cold or flu symptoms.  No self-harm attempts or injuries.  DEC assessment completed and recommended inpatient mental health admission.  ED provider also recommending this so agreeable to the plan.  DEC  gave clinical to behavioral  intake.  ED mental health boarding order set was completed with psychiatry and extended care consults.  Patient's home medications were reordered as well as as needed medications for anxiety and agitation.  Patient is voluntary at this time but would hold if wanting to leave as he stated to the DEC  that he put a bag over his head yesterday and attempt to suffocate himself so he continues to pose a threat to himself unless he is able to safety plan otherwise with extended care/DEC on a reassessment.  Patient was agreeable to the inpatient admission plan, voiced understanding, and all questions were answered prior to end of shift and patient handoff to next ED provider.     I have reviewed the nursing notes. I have reviewed the findings, diagnosis, plan and need for follow up with the patient.         New Prescriptions     No medications on file         Final diagnoses:   Suicidal ideation      Jenna Lange PA-C     [facility] EMERGENCY DEPARTMENT  7/29/2025    Per [facility] assessment documentation on 7/29/2025:    Referral Data and Chief Complaint  Fuentes Zamora presents to the ED by  self. Patient is presenting to the ED for the following concerns: Worsening psychosocial stress, Depression, Suicidal ideation, Suicide attempt, Anxiety. Factors that make the mental health crisis life threatening or complex are: Pt reports depression, suicidal ideation with plan and intent to hang himself.  He reports his mother talking him into come to the ED.  The reports tell her this is his last shot..        Informed Consent and Assessment Methods  Explained the crisis assessment process, including applicable information disclosures and limits to confidentiality, assessed understanding of the process, and obtained consent to proceed with the assessment.  Assessment methods included conducting a formal interview with patient, review of medical records, collaboration with medical staff, and obtaining  "relevant collateral information from family and community providers when available.  : done        History of the Crisis   Pt reports long history of suicidal ideation on/off through his life. He reports serious thoughts of ending it have been present about a month. Pt reports he came in at the urging of his mom. \"I never want to come.\" He states they talk frequently. Pt states nothing has worked to help his depression and anxiety. He has tried different medications. Pt speaks in a very hopeless manner, and not being able to see anything change for him. He reports the symptom took over and he does  not remember how to be his old self. Pt states he can not do anything; low energy/motivation. Personal cares have gone down. He showered before coming today and he has been going a week without showering. Pt states he is either in bed for days or stays awake for days. Pt states sometime he takes an extra seroquel to stay in bed and away from it all. Pt is teary at times and states it is the first he has cried in a long time. Pt reports poor appetite and isolating himself. I reports constand thoughts of suicide, different ways he could to it and the potential pain level. He reports current plan and intent to hang himself. He reports 2 days ago (Sunday) he took an extra seroquel, tied a plastic bag over his head to kill himself. He states he woke up with it off.  Pt has a hx of prior attempt by overdose. Pt reports SIB, cutting, last was a year ago. Denies HI, hallucinations. He reports hx of a problem with drinking. He denies current use of alcohol, last use was at [employer]. Pt reports occasional  use of cannabis and identifies use of an edible about a week ago. Pt reports vaping nicotine \"all day\". Pt states he is worried he will never feel like his old self again. \"If there was an easy way to end it, I'd take it.\" Pt states reports family hx of depression, anxiety and substance use. He reports his sister " "did ECT which he shares sounds scary.  He has lost a couple close friends to suicide, one was 2 years ago. Pt reports he stopped working due to his mental health/functioning.  He lost his apartment two weeks ago due to not working.  He is currently staying at an Extended Stay hotel. Pt reports one inpt hospitalization about 10 years ago at [facility]. He denies PHP/day treatment or CD tx history. Pt reports hx of therapy about 2 years ago.  Pt states his primary care provider is managing his medications currently. Review of the record indicates hx of bipolar and alcohol abuse, hx of intentional overdose. Pt is agreeable to inpatient admission. \"I don't feel right.\"    Details of most recent treatment:  Pt reports his primary care provider, Dr Wynn, manages pt's medications.  Other relevant history:  Pt reports he was born and raised in [place].  Parents currently live in [place].  Sister lives in [place].  Pt has never been . He has one 13 yo child.  Pt reports his poor mental health led to him no longer working.  Due to not working anymore, pt lost his apartment two weeks ago.  He is currently staying in an Extended Stay hotel.    Clinical Substantiation:  Pt presents to the ED with suicidal ideation, plan, and intent to hang himself. Pt reports medication trials have not helped, nothing has helped and he can't go on living this way.  Plan for intpatient admission.     Per Psychiatric provider consulted in the ED on 7/29/2025:  Chief Complaint   Patient presents with    Suicidal       Pt states that he feels like he is going to harm himself. Pt does not trust himself alone and wants help, has tried medications but nothing has worked.                  History of Present Illness:      Patient presented to the ED on 7/29/2025 for SI, in the context of increased depression and anxiety, especially over the last month.       Interview with patient:   Fuentes reports he is here for SI thoughts.  He has been feeling sad.  " Rates depression 10/10, anxiety 8/10, anger 0/10 with 10 as the worst.  He continues to have SI with intent, but notes he feels safe here because he knows he cannot do anything.  He reports lately he has been sleeping more than normal but there are periods where he doesn't need sleep for up to 3 days.  He eats if he is taking quetiapine for sleep but otherwise he does not have an appetite. He reports feeling hopeless and helpless.  He reports his thinking is different when he is depressed but was unable to explain how it is different.  He reports feeling anxious and worrying about everything.  He worries a lot about getting old and not accomplishing anything.  He has a son he has not talked to in 1.5 years.  He reports his son has been refusing to talk to him.  His son's mom has full custody of his son.       He reports he is taking Adderall for ADHD and quetiapine for sleep.  He denies taking anything for depression at this time. Most recently he took Abilify and Prozac for depression but they were not helpful.  Due to the patient's possible bipolar symptoms, explained it is not standard of care to start a antidepressant.   Discussed neuroleptic mediations in general: the purpose is to treat mood stabilization and/or psychosis.  Discussed side effects: sedation, metabolic syndrome, including weight gain, elevated glucose, elevated cholesterol and associated complications, increased prolactin levels and gynecomastia, dystonia, akathisia, TD, and NMS. Discussed the SE that are typical for Latuda, sedation and akithisia.  Discussed need to take Latuda with a minimum of 350 calories, usually dosed at dinner time or bedtime with a snack due to sedation.  Discussed lamotrigine for bipolar depression and the need for a slow taper. 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 Discussed the need to re-titrate if she misses more than 3 days. Fuentes opted to try  Latuda first since the response would be known sooner than if started lamotrigine due to the slow taper of lamotrigine.           Impression:      This patient is a 40 year old year old  male with a past psychiatric history of  Depression, Anxiety Disorder, ADHD, Bipolar Disorder, Substance Use Disorder, YANA, panic, persistent depressive disorder and social phobia, who presented to the ED on 2025  for SI with a plan to hang himself.  Prior to presenting to the ED the patient reports he has been depressed for about 1.5 years.  He reports his son has been refusing to talk to him for about 1.5 years.  He also has lost 2 friends to suicide and another  from an accident.  .       At this time, he continues to endorse SI.  He is willing to take medication.  He reports he is taking Adderall for ADHD and seroquel for sleep.  He denies taking anything for his mood.  He endorses some periods of not needing sleep (up to 3 days at a time) and other times when he is unable to get out of bed.  His ADLs are poor - smells of body odor.  He lost his job due to not functioning d/t poor [employer] and lost his apartment due to not able to pay the bills. He is agreeable to try Latuda for mood stabilization.  Also discussed lamotrigine, chose to try Latuda first because as it will be known sooner if it is helpful since lamotrigine has such a slow taper.      Significant symptoms are noted in the HPI. There is genetic loading for mood and anxiety.  Medical history does not appear to be significant.  Substance use does not appear to be playing a contributing role in the patient's presentation. However, Fuentes does have a history of AUD and other substance use.  Major stressors are loss and chronic mental health issues.  Patient appears to cope with stress/frustration/emotion by withdrawing and acting out to self.  Patient's support system includes family and outpatient team. Protective factors: family and engaged in treatment.  Risk factors: SI, maladaptive coping, past behaviors, and loss. Patient Strengths: help seeking, engaging with ED treatment team, and willing to be admitted and take medications.      Based on interview with patient, record review, conversations ED staff, [facility] staff and ED attending, the patient meets criteria for unspecified mood disorder, MDD vs bipolar depression.  Current medications are listed above.  Recommended medication adjustments are listed below.  Acute inpatient stabilization is needed as indicated by ongoing SI with a plan to hang himself .  Other recommendations are listed below.     Risk for harm is elevated.     Brief Therapeutic Intervention(s):   Provided rappport building, active listening, unconditional positive regard, and validation.     Legal Status: Voluntary             Diagnoses:      Principal Diagnosis: unspecified mood disorder, MDD vs bipolar depression     Secondary psychiatric diagnoses of concern this admission:  YANA  Social phobia by hx  Panic by hx  ADHD by hx     Current medical diagnosis being treated:   none          Recommendations:      Communicated the case and writer's recommendations with Dr Homero Go MD, ED provider, via face to face conversation, writer asked if writer should enter orders in the EHR, the ED provider agreed.     Recommend acute inpatient stabilization  2.   Recommend starting Latuda 20 mg hs for depression, possibly bipolar depression  3.   Continue:               Seroquel 100 mg hs for sleep and anxiety  4.   Hold: (inpatient team to decide if needed while IP)              Adderall XR 30 mg daily              Adderall 5 mg bid   5.   Continue to consult psychiatry as needed.     Please call [facility]/DEC at [phone] if you have follow-up questions or wish to place another consult.          CHEMICAL DEPENDENCY HISTORY   History   Drug Use No   Patient reports a history of alcohol use disorder and that alcohol was a problem for him from the ages of  "[age] years old.  Patient reports he now only drinks alcohol once a month or less and describes alcohol use as, \"no more than 3 drinks.\"  Patient also reports a history of opiate use disorder and that he engaged in opiate use described as, \"pain pills,\" from the ages of [age] years old.  Patient reports detoxing off opiates independently and that he no longer engages in opiate use unless indicated by a medical professional.  Patient reports he does engage in occasional cannabis use described as, \"1 gummy once a month.\"  Patient also reports nicotine use described as, \"vape all day.\"  Patient reports caffeine use described as, \"1 soda per day.\"    Social History    Substance and Sexual Activity      Alcohol use: Not Currently        Comment: Variable      History   Smoking Status    Every Day    Types: Vaping Device   Smokeless Tobacco    Never       Treatment: Denies  Detox: Denies  Legal: None reported       PAST PSYCHIATRIC HISTORY   Psychiatrist: None currently.  Patient reports he would be open to establishing one.  Patient reports currently his outpatient primary doctor has been prescribing mental health medications: Washington Wynn MD  [address] [place] [id number]   Therapist: None  Case Management: None reported  Hospitalizations: Per unit CTC documentation from 7/30/2025:  11/2022 in [place] after an intentional overdose  2013 [facility] due to SI 2011 hospitalization as well   History of Commitment: None per chart review  Past Medications: Celexa, Zoloft, Lexapro, and bupropion in the past and either did not find them helpful or did not like them because of side effects.  Buspirone ; ineffective, clonidine; ineffective and felt, \"funny.\"  Abilify; initially helpful however not effective over time.  Adderall XR and IR; patient reports this medication has been helpful for management of ADHD symptoms however has needed higher dosing to maintain benefit from the medication.  ECT:  " No  Suicide Attempts/Gun Access: Patient has a history of 2 suicide attempts.first was in 2022 by medication overdose on Flexeril as well as drank a liter of vodka.  Second was prior to current hospitalization where patient put a bag on head as a suicide attempt.  Patient denies gun access.  Community Supports: Patient reports having supportive family.       PAST MEDICAL HISTORY   Past Medical History:   Diagnosis Date    Anxiety     Depressive disorder     anxiety    Major depressive disorder with single episode     hospitalized for SI    Other acne        Past Surgical History:   Procedure Laterality Date    HC TOOTH EXTRACTION W/FORCEP      Under general anesthesia       Primary Care Provider: Washington Wynn  Medications:   Current Facility-Administered Medications   Medication Dose Route Frequency Provider Last Rate Last Admin    amphetamine-dextroamphetamine (ADDERALL XR) ER cap 30 mg  30 mg Oral Daily Jenna Lange PA-C   30 mg at 07/30/25 0757    amphetamine-dextroamphetamine (ADDERALL) per tablet 5 mg  5 mg Oral BID Yanet Moreira APRN CNP        lurasidone (LATUDA) tablet 20 mg  20 mg Oral Daily with supper Melody Kraus APRN CNP   20 mg at 07/29/25 1637    QUEtiapine (SEROquel) tablet 100 mg  100 mg Oral At Bedtime Melody Kraus APRN CNP   100 mg at 07/29/25 2049     Medications as needed:   Current Facility-Administered Medications   Medication Dose Route Frequency Provider Last Rate Last Admin    acetaminophen (TYLENOL) tablet 650 mg  650 mg Oral Q4H PRN Washington Villeda MD        alum & mag hydroxide-simethicone (MAALOX) suspension 30 mL  30 mL Oral Q4H PRN Washington Villeda MD        hydrOXYzine HCl (ATARAX) tablet 25 mg  25 mg Oral Q4H PRN Jenna Lange PA-C        nicotine polacrilex (NICORETTE) gum 4 mg  4 mg Buccal Q1H PRN Jenna Lange PA-C        OLANZapine (zyPREXA) tablet 10 mg  10 mg Oral TID PRN Washington Villeda MD         Or    OLANZapine (zyPREXA) injection 10 mg  10 mg Intramuscular TID PRN Washington Villeda MD        polyethylene glycol (MIRALAX) Packet 17 g  17 g Oral Daily PRN Washington Villeda MD        traZODone (DESYREL) tablet 50 mg  50 mg Oral At Bedtime PRN Washington Villeda MD   50 mg at 07/29/25 2155       ALLERGIES: Patient has no known allergies.       MEDICATIONS   Current Facility-Administered Medications   Medication Dose Route Frequency Provider Last Rate Last Admin    acetaminophen (TYLENOL) tablet 650 mg  650 mg Oral Q4H PRN Washington Villeda MD        alum & mag hydroxide-simethicone (MAALOX) suspension 30 mL  30 mL Oral Q4H PRN Washington Villeda MD        amphetamine-dextroamphetamine (ADDERALL XR) ER cap 30 mg  30 mg Oral Daily Jenna Lange PA-C   30 mg at 07/30/25 0757    amphetamine-dextroamphetamine (ADDERALL) per tablet 5 mg  5 mg Oral BID Yanet Moreira APRN CNP        hydrOXYzine HCl (ATARAX) tablet 25 mg  25 mg Oral Q4H PRN Jenna Lange PA-C        lurasidone (LATUDA) tablet 20 mg  20 mg Oral Daily with supper Melody Kraus APRN CNP   20 mg at 07/29/25 1637    nicotine polacrilex (NICORETTE) gum 4 mg  4 mg Buccal Q1H PRN Jenna Lange PA-C        OLANZapine (zyPREXA) tablet 10 mg  10 mg Oral TID PRN Washington Villeda MD        Or    OLANZapine (zyPREXA) injection 10 mg  10 mg Intramuscular TID PRN Washington Villeda MD        polyethylene glycol (MIRALAX) Packet 17 g  17 g Oral Daily PRN Washington Villeda MD        QUEtiapine (SEROquel) tablet 100 mg  100 mg Oral At Bedtime Melody Kraus APRN CNP   100 mg at 07/29/25 2049    traZODone (DESYREL) tablet 50 mg  50 mg Oral At Bedtime PRN Washington Villeda MD   50 mg at 07/29/25 2155        Medication adherence issues: MS Med Adherence Y/N: Yes, Patient reports a history of noncompliance with psychiatric medications in the  [facility]  Medication side effects: MEDICATION SIDE EFFECTS: no side effects reported  Benefit: Yes / No: Yes       ROS   Pertinent items are noted in HPI.       FAMILY HISTORY   Family History   Problem Relation Age of Onset    Cancer Mother         Breast cancer age 52    Depression Mother     Anxiety Disorder Mother     Substance Abuse Father     Substance Abuse Sister     Bipolar Disorder Sister     Anxiety Disorder Sister     Depression Sister     Thrombocytopenia Son         2 month hospital stay, now off meds        Psychiatric: Patient reports his mother and sister have depression  Chemical: Patient reports his sister has experienced opiate use disorder and that patient also has a family history of alcohol use disorder.  Suicide: Denies any family history of completed suicide       SOCIAL HISTORY   Social History     Socioeconomic History    Marital status: Single     Spouse name: Not on file    Number of children: Not on file    Years of education: Not on file    Highest education level: Not on file   Occupational History    Occupation: [employer] work   Tobacco Use    Smoking status: Every Day     Types: Vaping Device    Smokeless tobacco: Never   Vaping Use    Vaping status: Every Day    Substances: Nicotine    Devices: Disposable, Refillable tank   Substance and Sexual Activity    Alcohol use: Not Currently     Comment: Variable    Drug use: No    Sexual activity: Not Currently     Partners: Female   Other Topics Concern     Service Not Asked    Blood Transfusions Not Asked    Caffeine Concern Not Asked    Occupational Exposure Not Asked    Hobby Hazards Not Asked    Sleep Concern Not Asked    Stress Concern Not Asked    Weight Concern Not Asked    Special Diet Not Asked    Back Care Not Asked    Exercise Yes    Bike Helmet No    Seat Belt No    Self-Exams Yes    Parent/sibling w/ CABG, MI or angioplasty before 65F 55M? Not Asked   Social History Narrative    Single. No current SO. Has 2 children  (1 son, 1 daughter). Sees often. Works on a ranch for a living. Tobacco use: 1ppdAlcohol use: started alcohol 1 month ago. Drinks whiskey. Has drank up to 1 liter every 2 days. Toward the end of 1 month of use, feels he was having an alcohol abuse problem. Drug use: Denies        Currently living by self.  Was living with parents in [place].          Live alone.  No pets.  One child, mostly lives with mother (10/2024)     Social Drivers of Health     Financial Resource Strain: Low Risk  (7/29/2025)    Financial Resource Strain     Within the past 12 months, have you or your family members you live with been unable to get utilities (heat, electricity) when it was really needed?: No   Food Insecurity: High Risk (7/29/2025)    Food Insecurity     Within the past 12 months, did you worry that your food would run out before you got money to buy more?: Yes     Within the past 12 months, did the food you bought just not last and you didn t have money to get more?: Yes   Transportation Needs: Low Risk  (7/29/2025)    Transportation Needs     Within the past 12 months, has lack of transportation kept you from medical appointments, getting your medicines, non-medical meetings or appointments, work, or from getting things that you need?: No   Physical Activity: Inactive (12/28/2022)    Exercise Vital Sign     Days of Exercise per Week: 0 days     Minutes of Exercise per Session: 0 min   Stress: Stress Concern Present (12/28/2022)    [employer] - Occupational Stress Questionnaire     Feeling of Stress : To some extent   Social Connections: Socially Isolated (12/28/2022)    Social Connection and Isolation Panel [NHANES]     Frequency of Communication with Friends and Family: More than three times a week     Frequency of Social Gatherings with Friends and Family: Twice a week     Attends Spiritism Services: Never     Active Member of Clubs or Organizations: No     Attends Club or Organization  Meetings: Not on file     Marital Status: Never    Interpersonal Safety: Low Risk  (7/29/2025)    Interpersonal Safety     Do you feel physically and emotionally safe where you currently live?: Yes     Within the past 12 months, have you been hit, slapped, kicked or otherwise physically hurt by someone?: No     Within the past 12 months, have you been humiliated or emotionally abused in other ways by your partner or ex-partner?: No   Housing Stability: High Risk (7/29/2025)    Housing Stability     Do you have housing? : No     Are you worried about losing your housing?: No       Born and Raised: Born and raised in [place].  Patient's parents currently live in [place] and sister lives in [place], [place].  Patient reports having a supportive family and denies any trauma history.  Occupation: Unemployed currently.  Marital Status: Single  Children: Patient has children however does not have custody  Legal: None reported, per chart review: Hx of DANCO and DANCO violations   Living Situation: Living arrangements - the patient lives alone in an extended stay hotel.  Patient reports he lost his Apt. 2 weeks ago.  Patient reports housing is not stable.  ASSETS/STRENGTHS: Help seeking       MENTAL STATUS EXAM   Appearance:  Casually groomed  Mood: Depressed  Affect: blunted  was congruent to speech  Suicidal Ideation: Passive SI with no plan or intent  Homicidal Ideation: PRESENT / ABSENT: absent   Thought process: Goal oriented and logical  Thought content: endorses suicidal ideation without plan; without intent [details in Interim History].   Fund of Knowledge: Average  Attention/Concentration: Fair  Language ability:  Intact  Memory: Appears intact, not formally assessed  Insight:  fair.  Judgement: fair  Orientation: Yes, x4  Psychomotor Behavior: normal or unremarkable    Muscle Strength and Tone: MuscleStrength: Normal  Gait and Station: Normal       PHYSICAL EXAM   Vitals: /81 (BP Location: Left arm)    Pulse 88   Temp 97.3  F (36.3  C) (Temporal)   Resp 16   Ht 1.829 m (6')   Wt 86.8 kg (191 lb 6.4 oz)   SpO2 96%   BMI 25.96 kg/m      Physical exam as per Jenna Lange PA-C  07/29/25:    Physical Exam   BP: 132/73  Pulse: 108  Temp: 98.6  F (37  C)  Resp: 17  Height: 182.9 cm (6')  Weight: 86.8 kg (191 lb 6.4 oz)  SpO2: 99 %  Physical Exam  Constitutional:       General: He is not in acute distress.     Appearance: Normal appearance. He is normal weight. He is not ill-appearing, toxic-appearing or diaphoretic.   Cardiovascular:      Rate and Rhythm: Normal rate and regular rhythm.   Pulmonary:      Effort: Pulmonary effort is normal. No respiratory distress.      Breath sounds: Normal breath sounds.   Abdominal:      General: Bowel sounds are normal.      Palpations: Abdomen is soft.   Musculoskeletal:         General: Normal range of motion.   Skin:     General: Skin is warm.      Capillary Refill: Capillary refill takes less than 2 seconds.   Neurological:      Mental Status: He is alert and oriented to person, place, and time. Mental status is at baseline.   Psychiatric:         Mood and Affect: Mood normal.         Behavior: Behavior normal.            LABS   personally reviewed.     EKG completed on 7/30/2025 and results: Sinus rhythm, normal EKG, QTc: 431    Writer reviewed admission labs.  Patient noted to have slightly elevated anion gap: 17, lipids elevated including cholesterol: 207 plan will be to continue to monitor lab values and treat as clinically indicated.     Latest Reference Range & Units 07/29/25 14:40 07/30/25 07:31 07/30/25 13:33   Sodium 135 - 145 mmol/L  141    Potassium 3.4 - 5.3 mmol/L  4.3    Chloride 98 - 107 mmol/L  103    Carbon Dioxide (CO2) 22 - 29 mmol/L  21 (L)    Urea Nitrogen 6.0 - 20.0 mg/dL  14.2    Creatinine 0.67 - 1.17 mg/dL  0.86    GFR Estimate >60 mL/min/1.73m2  >90    Calcium 8.8 - 10.4 mg/dL  9.2    Anion Gap 7 - 15 mmol/L  17 (H)    Albumin 3.5 - 5.2  "g/dL  4.3    Protein Total 6.4 - 8.3 g/dL  6.8    Alkaline Phosphatase 40 - 150 U/L  62    ALT 0 - 70 U/L  53    AST 0 - 45 U/L  43    Bilirubin Total <=1.2 mg/dL  0.5    Cholesterol <200 mg/dL  207 (H)    Glucose 70 - 99 mg/dL  112 (H)    HDL Cholesterol >=40 mg/dL  49    Estimated Average Glucose <117 mg/dL  108    Hemoglobin A1C <5.7 %  5.4    LDL Cholesterol Calculated <100 mg/dL  134 (H)    Non HDL Cholesterol <130 mg/dL  158 (H)    Triglycerides <150 mg/dL  122    Vitamin B12 232 - 1,245 pg/mL  365    Vitamin D, Total (25-Hydroxy) 20 - 50 ng/mL  22    WBC 4.0 - 11.0 10e3/uL  7.2    Hemoglobin 13.3 - 17.7 g/dL  14.4    Hematocrit 40.0 - 53.0 %  43.3    Platelet Count 150 - 450 10e3/uL  227    RBC Count 4.40 - 5.90 10e6/uL  4.83    MCV 78 - 100 fL  90    MCH 26.5 - 33.0 pg  29.8    MCHC 31.5 - 36.5 g/dL  33.3    RDW 10.0 - 15.0 %  12.9    Amphetamine Qual Urine Screen Negative  Screen Negative     Fentanyl Qual Urine Screen Negative  Screen Negative     Cocaine Urine Screen Negative  Screen Negative     Benzodiazepine Urine Screen Negative  Screen Negative     Opiates Qualitative Urine Screen Negative  Screen Negative     PCP Urine Screen Negative  Screen Negative     Cannabinoids Qual Urine Screen Negative  Screen Negative     Barbiturates Qual Urine Screen Negative  Screen Negative     EKG 12-lead, tracing only    Rpt (P)   Diastolic Blood Pressure mmHg   See Comment   Systolic Blood Pressure mmHg   See Comment   (L): Data is abnormally low  (H): Data is abnormally high  (P): Preliminary  Rpt: View report in Results Review for more information    No results found for: \"PHENYTOIN\", \"PHENOBARB\", \"VALPROATE\", \"CBMZ\"       ASSESSMENT   This is a 40 year old male with history of depression, anxiety Disorder, ADHD, Bipolar Disorder, Substance Use Disorder, YANA, panic, persistent depressive disorder and social phobia who presented to [facility] ED on 7/29/2025 reporting SI.  In the ED, patient reported that he put a bag " "over his head yesterday as a suicide attempt by suffocation.  Urine drug screening completed in the ED and negative for all substances. Patient was evaluated by provider in the ED and determined to be medically stable.  Patient subsequently admitted voluntarily to inpatient psychiatric unit 10N with attending Dr. Gifford for further psychiatric stabilization.  Upon admission, patient placed on status 15 monitoring.  Patient also placed on precautions: Suicide precautions.    At time of admission, patient reports noncompliance to psychiatric medications for the past 6 weeks.  Patient reports he did not take PTA medications due to, \"they were left in my apartment and now are in storage.\"  Patient reports that he stopped Abilify due to it being ineffective for mood disorder symptoms.  Patient also reports he trialed Prozac however did not find this medication helpful.  Patient reports that PTA Adderall has been effective for management of ADHD symptoms and that this medication helps significantly with concentration.  Patient reports he was taking Adderall XR 30 mg daily along with Adderall 10 mg instant release 2 times daily however that he has not taken this in approximately 6 weeks.  Subsequently, writer discussed option of decreasing Adderall XR to 20 mg p.o. daily and Adderall IR to 5 mg twice daily to target ADHD symptom management.  Patient in agreement with this.  Patient also agreeable to continue trial of Latuda which was started in the ED.  Patient also reports quetiapine to be helpful for sleep and anxiety symptoms.  Due to this, writer discussed option of initiating quetiapine 25-50 mg p.o. 3 times daily as needed for irritation, anxiety, and sleep which patient is in agreement with.  Patient reports engaging in nicotine use described as vaping all day.  Patient agreeable to utilizing nicotine patch and nicotine gum for nicotine withdrawal symptoms.  Patient also agreeable to an EKG to monitor heart rhythm. "  Patient reports notably poor p.o. intake prior to present to the hospital however that he is also concerned about weight gain; due to this writer placed dietitian consult to address poor p.o. intake and options for healthy snacks and writing menu options.       DIAGNOSIS   Principal Problem:    Bipolar 1 disorder (H), currently depressed  Suicide attempt  Anxiety disorder, unspecified  ADHD, predominantly inattentive type  History of alcohol use disorder  History of MDD  History of opiate use disorder, in remission  Cannabis use      Active Problem List:  Patient Active Problem List   Diagnosis    Other acne    Social phobia    Panic disorder without agoraphobia    Major depressive disorder, single episode, moderate (H)    Generalized anxiety disorder    Depression    Attention deficit hyperactivity disorder, predominantly inattentive type    Displacement of lumbar intervertebral disc without myelopathy    Anxiety    Suicidal ideation    Bipolar 1 disorder (H)       Clinically Significant Risk Factors Present on Admission                               # Overweight: Estimated body mass index is 25.96 kg/m  as calculated from the following:    Height as of this encounter: 1.829 m (6').    Weight as of this encounter: 86.8 kg (191 lb 6.4 oz).           # Financial/Environmental Concerns: other (see comments) (no longer working, recently lost apartment)                   PLAN   1. Education given regarding diagnostic and treatment options with risks, benefits and alternatives and adequate verbalization of understanding.  2. Admitted 7/29/2025.  Precautions placed: Suicide precautions.  3. Medications: 7/30/2025: PTA medications reviewed.  Decreased Adderall XR to 20 mg p.o. daily to target ADHD symptoms; dose decrease due to patient reporting noncompliance to higher dosing prior to admission the hospital.  Decrease Adderall IR to 5 mg p.o. 2 times daily as patient reports noncompliance to medication prior to admitting  to the hospital  Continue Latuda 20 mg p.o. daily with supper, must be taken with a minimum of 350 loly.  Indication: Mood disorder symptoms  Continue quetiapine at 100 mg p.o. at bedtime, indication: Anxiety and sleep promotion  4. Medications:  Hospital  Initiate nicotine 21 mg/24-hour patch, 1 patch transdermal daily for nicotine withdrawal symptoms  PRN nicotine gum 4 mg buccal every 1 hour as needed for nicotine withdrawal symptoms  PRN quetiapine 25-50 mg p.o. 3 times daily, first-line for anxiety symptoms, agitation, and sleep promotion  PRN olanzapine 10 mg p.o. 3 times daily as needed for agitation, order linked with backup olanzapine 10 mg IM injection  As needed trazodone 50 mg p.o. at bedtime as needed for sleep promotion, may repeat after 60 minutes  PRN hydroxyzine 25 mg p.o. over fours as needed for anxiety symptoms  5. Consultations:  Hospitalist to follow as needed.  6. Structure and Supervision  Unit 10N.  7.   is following in regards to collecting and reviewing collateral information, referrals and disposition planning.  Legal: Voluntary  Referrals: TBD  Care Coordination: Per unit CTC  Placement: TBD  Anticipated Discharge: 7-10 days     Further treatment programming to be determined throughout the hospital course.        Risk Assessment: [facility] RISK ASSESSMENT: Patient able to contract for safety and Patient on precautions    This note was created with help of Dragon dictation system. Grammatical / typing errors are not intentional.    Yanet Moreira, JEEVAN CNP       CERTIFICATION   Initial Certification I certify that the inpatient psychiatric facility admission was medically necessary for treatment which could   reasonably be expected to improve the patient s condition.                                       I estimate 7-10 days of hospitalization is necessary for proper treatment of the patient. My plans for post-hospital care for this patient are TBD.                                        Yanet Moreira, JEEVAN CNP     -     7/30/2025     -     12:00 PM

## 2025-07-30 NOTE — PLAN OF CARE
BEH IP Unit Acuity Rating Score (UARS)  Patient is given one point for every criteria they meet.    CRITERIA SCORING   On a 72 hour hold, court hold, committed, stay of commitment, or revocation. 0    Patient LOS on BEH unit exceeds 20 days. 0  LOS: 1   Patient under guardianship, 55+, otherwise medically complex, or under age 11. 0   Suicide ideation without relief of precipitating factors. 1   Current plan for suicide. 1   Current plan for homicide. 0   Imminent risk or actual attempt to seriously harm another without relief of factors precipitating the attempt. 0   Severe dysfunction in daily living (ex: complete neglect for self care, extreme disruption in vegetative function, extreme deterioration in social interactions). 0   Recent (last 7 days) or current physical aggression in the ED or on unit. 0   Restraints or seclusion episode in past 72 hours. 0   Recent (last 7 days) or current verbal aggression, agitation, yelling, etc., while in the ED or unit. 0   Active psychosis. 0   Need for constant or near constant redirection (from leaving, from others, etc).  0   Intrusive or disruptive behaviors. 0   Patient requires 3 or more hours of individualized nursing care per 8-hour shift (i.e. for ADLs, meds, therapeutic interventions). 0   TOTAL 2

## 2025-07-30 NOTE — PLAN OF CARE
Problem: Sleep Disturbance  Goal: Adequate Sleep/Rest  Outcome: Progressing    Patient slept approximately 7 hours during the night shift, appeared comfortable with unlabored breathing patterns. Continues safety checks every 15 minutes. Patient has schedule lab for this morning.

## 2025-07-30 NOTE — PLAN OF CARE
Occupational Therapy Initial Note        07/30/25 1300   General Information   Date Initially Attended OT 07/30/25   Clinical Impression   Affect Appropriate to situation   Orientation Oriented to person, place and time   Appearance and ADLs General cleanliness observed in most areas   Attention to Internal Stimuli No observed signs   Interaction Skills Interacts appropriately with staff;Initiates appropriately with staff;Interacts appropriately with peers;Initiates appropriately with peers   Ability to Communicate Needs Does so with prompts   Verbal Content Appropriate to topic   Ability to Maintain Boundaries Maintains appropriate physical boundaries   Participation Independently participates   Concentration Concentrates 30+ minutes   Ability to Concentrate Without difficulty   Follows and Comprehends Directions Independently follows 1 step verbal directions   Memory Delayed and immediate recall intact   Organization Independently organizes simple tasks   Decision Making Needs choices limited to 1 choice   Planning and Problem Solving Independently plans ahead   Ability to Apply and Learn Concepts Applies within group structure   Frustrations / Stress Tolerance Needs further assessment   Level of Insight Insightful into needs, issues, goals   Self Esteem Discredits self/work   Social Supports Needs further assessment         Adeola Terrell, OTR/L

## 2025-07-30 NOTE — PLAN OF CARE
-Attending Provider: JEEVAN Lin CNP  -Voicemail Code: [id number]  -Team Note Due: Tuesday  -Next Steps:    IRTS referrals   Set up psychiatry         Assessment/Intervention/Current Symtoms and Care Coordination:  Chart review and met with team, discussed pt progress, symptomology, and response to treatment.  Discussed the discharge plan and any potential impediments to discharge.  CTC met with Fuentes to introduce self, explain role of CTC, and assess for resources. Fuentes presented with a flat affect, depressed mode, and feeling guilty and hopeless. See initial assessment for further details.      Discharge Plan or Goal:  Pending further stabilization, continued compliance with medications, continued activities of daily living, and development of a safe discharge plan.     Considerations: set up psychiatry, IRTS, board and lodge     Barriers to Discharge:  Impact of mental health symptoms on well being   Impact of mental health symptoms on activities of daily living  Need for medication monitoring and medication management     Referral Status:  None at this time     Legal Status:  Voluntary    County:   File Number:   Start and expiration date of commitment:     Contacts:

## 2025-07-30 NOTE — PLAN OF CARE
"He is active on the unit and is talkative towards others.  States that he slept well last night.  He denies pain/discomfort and cold, flu like symptoms when asked.  States over the last 24 hours that his anxiety and depression have been high, and said that is why he decided to come into the hospital.  States that his anxiety cases racing thoughts and concentration difficulty.  He admits to passive suicidal ideation, but has no plan while hospitalized and agrees to contract for safety.  He denies homicidal ideations and also denies hallucinations as well.  States that he is behind his rent due to not working and might need to find a new place to stay.  Also, states that he does not have a current psychiatrist, yet is open to getting one.  Informed writer that his hospital goal is to, \" get on the right meds.\"  He agrees to come to staff for concerns.  Dental adhesive and denture cup was given to him per request in the shift.    "

## 2025-07-30 NOTE — PLAN OF CARE
"  Rehab Group    Start time: 1015  End time: 1115  Patient time total: 60 minutes    attended full group    #4 attended   Group Type: occupational therapy and OT Clinic   Group Topic Covered: balanced lifestyle, coping skills, healthy leisure time, problem solving, relationship skills/support systems, self-esteem, and social skills     Group Session Detail:  OT: Pt actively participated in occupational therapy clinic to facilitate coping skill exploration, creative expression within personally meaningful activities, and clinical observation of social, cognitive, and kinesthetic performance skills.     Patient Response/Contribution:  positive affect, cooperative with task, organized, socially appropriate, safe use of materials/supplies, attentive, and actively engaged     Patient Detail:    Pt arrived to group on time. Pt presented as pleasant, and with calm affect. Pt required assistance with decision-making, stated to \"get me whatever you think I would like.\" Provided pt with canvas and acrylic markers. Pt actively engaged in canvas project. Pt initiated and engaged in conversations with peers and staff. Pt expressed interest in this writers educational history, and requested additional education on role of OT while in this setting. Pt verbalized understanding.     Pt independently terminated task, and then cleaned workspace and tools used during group. Pt requested to \"throw away picture.\" Provided encouragement to pt, but pt ultimately requested again to discard canvas. Pt thanked writer before exiting group.         [id number] OT Group (2 or more in attendance)      Patient Active Problem List   Diagnosis    Other acne    Social phobia    Panic disorder without agoraphobia    Major depressive disorder, single episode, moderate (H)    Generalized anxiety disorder    Depression    Attention deficit hyperactivity disorder, predominantly inattentive type    Displacement of lumbar intervertebral disc without myelopathy    " Anxiety    Suicidal ideation    Bipolar 1 disorder (H)

## 2025-07-30 NOTE — PLAN OF CARE
"  Rehab Group    Start time: 1115  End time: 1200  Patient time total: 40 minutes    attended full group    #4 attended   Group Type: occupational therapy and general health and coping   Group Topic Covered: balanced lifestyle, coping skills, problem solving, relationship skills/support systems, self-esteem, and sensory intervention     Group Session Detail:  Pt actively engaged in a structured occupational therapy group with the focus on effective communication in order to increase decision-making, problem-solving, interpersonal relationships, and self-awareness. Pt participated in discussion about habits of poor and effective communication. Pt also received education on four main communication styles. Pt then participated in a worksheet about effective communication and relationship to support system.      Patient Response/Contribution:  positive affect, organized, safe use of materials/supplies, listened actively, and expressed understanding of topic     Patient Detail:    Pt arrived to group on time. Pt presented as calm and pleasant throughout group. Pt observed to actively engage, participated in discussion occasionally through offering ideas on topics. Pt completed a reflection on \"poor and effective communication habits\" via a worksheet. Pt left group briefly to utilize the restroom, pt returned briefly after. Although when pt returned, pt was only participant.     Pt did not finish worksheet, and encouragement was provided to finish on later date or time. Pt was receptive to this, exited group and thanked writer before leaving.          [id number] OT Group (2 or more in attendance)      Patient Active Problem List   Diagnosis    Other acne    Social phobia    Panic disorder without agoraphobia    Major depressive disorder, single episode, moderate (H)    Generalized anxiety disorder    Depression    Attention deficit hyperactivity disorder, predominantly inattentive type    Displacement of lumbar intervertebral " disc without myelopathy    Anxiety    Suicidal ideation    Bipolar 1 disorder (H)

## 2025-07-30 NOTE — PLAN OF CARE
Goal Outcome Evaluation:    Underwear x 2  Shorts  T-shirts x 2  1 cell phone  1 x sneakers  1 wallet  1 x hat  3 x vapes  A               Admission:  I am responsible for any personal items that are not sent to the safe or pharmacy.  [facility] is not responsible for loss, theft or damage of any property in my possession.    Signature:  _________________________________ Date: _______  Time: _____                                              Staff Signature:  ____________________________ Date: ________  Time: _____      2nd Staff person, if patient is unable/unwilling to sign:    Signature: ________________________________ Date: ________  Time: _____     Discharge:  [facility] has returned all of my personal belongings:    Signature: _________________________________ Date: ________  Time: _____                                          Staff Signature:  ____________________________ Date: ________  Time: _____

## 2025-07-31 VITALS
WEIGHT: 186.3 LBS | HEIGHT: 72 IN | DIASTOLIC BLOOD PRESSURE: 81 MMHG | BODY MASS INDEX: 25.23 KG/M2 | HEART RATE: 100 BPM | TEMPERATURE: 98.2 F | SYSTOLIC BLOOD PRESSURE: 125 MMHG | OXYGEN SATURATION: 100 % | RESPIRATION RATE: 16 BRPM

## 2025-07-31 PROCEDURE — 250N000013 HC RX MED GY IP 250 OP 250 PS 637

## 2025-07-31 PROCEDURE — 97150 GROUP THERAPEUTIC PROCEDURES: CPT | Mod: GO

## 2025-07-31 PROCEDURE — 124N000002 HC R&B MH UMMC

## 2025-07-31 PROCEDURE — 250N000013 HC RX MED GY IP 250 OP 250 PS 637: Performed by: PSYCHIATRY & NEUROLOGY

## 2025-07-31 PROCEDURE — 99232 SBSQ HOSP IP/OBS MODERATE 35: CPT | Mod: AI

## 2025-07-31 PROCEDURE — H2032 ACTIVITY THERAPY, PER 15 MIN: HCPCS

## 2025-07-31 RX ORDER — ASCORBIC ACID 250 MG
250 TABLET,CHEWABLE ORAL DAILY
Status: DISPENSED | OUTPATIENT
Start: 2025-07-31

## 2025-07-31 RX ORDER — LURASIDONE HYDROCHLORIDE 40 MG/1
40 TABLET, FILM COATED ORAL
Status: ACTIVE | OUTPATIENT
Start: 2025-08-01

## 2025-07-31 RX ADMIN — DEXTROAMPHETAMINE SACCHARATE, AMPHETAMINE ASPARTATE MONOHYDRATE, DEXTROAMPHETAMINE SULFATE, AND AMPHETAMINE SULFATE 20 MG: 5; 5; 5; 5 CAPSULE ORAL at 07:25

## 2025-07-31 RX ADMIN — LURASIDONE HYDROCHLORIDE 20 MG: 20 TABLET, FILM COATED ORAL at 18:05

## 2025-07-31 RX ADMIN — ACETAMINOPHEN 650 MG: 325 TABLET ORAL at 15:47

## 2025-07-31 RX ADMIN — NICOTINE 1 PATCH: 21 PATCH, EXTENDED RELEASE TRANSDERMAL at 07:25

## 2025-07-31 RX ADMIN — DEXTROAMPHETAMINE SACCHARATE, AMPHETAMINE ASPARTATE, DEXTROAMPHETAMINE SULFATE AND AMPHETAMINE SULFATE 5 MG: 1.25; 1.25; 1.25; 1.25 TABLET ORAL at 12:26

## 2025-07-31 RX ADMIN — QUETIAPINE FUMARATE 25 MG: 25 TABLET ORAL at 09:57

## 2025-07-31 RX ADMIN — QUETIAPINE FUMARATE 100 MG: 100 TABLET ORAL at 21:11

## 2025-07-31 RX ADMIN — QUETIAPINE FUMARATE 25 MG: 25 TABLET ORAL at 17:08

## 2025-07-31 RX ADMIN — Medication 250 MG: at 12:26

## 2025-07-31 RX ADMIN — DEXTROAMPHETAMINE SACCHARATE, AMPHETAMINE ASPARTATE, DEXTROAMPHETAMINE SULFATE AND AMPHETAMINE SULFATE 5 MG: 1.25; 1.25; 1.25; 1.25 TABLET ORAL at 07:25

## 2025-07-31 ASSESSMENT — ACTIVITIES OF DAILY LIVING (ADL)
ADLS_ACUITY_SCORE: 15
HYGIENE/GROOMING: INDEPENDENT
ORAL_HYGIENE: INDEPENDENT
ADLS_ACUITY_SCORE: 15
ADLS_ACUITY_SCORE: 15
HYGIENE/GROOMING: INDEPENDENT;SHOWER
ADLS_ACUITY_SCORE: 15
DRESS: INDEPENDENT
ADLS_ACUITY_SCORE: 15
DRESS: INDEPENDENT
ADLS_ACUITY_SCORE: 15
ORAL_HYGIENE: INDEPENDENT

## 2025-07-31 NOTE — PLAN OF CARE
"  Rehab Group    Start time: 1315  End time: 1400  Patient time total: 35 minutes    attended full group    #5 attended   Group Type: occupational therapy and general health and coping   Group Topic Covered: balanced lifestyle, coping skills, healthy leisure time, mindfulness, problem solving, relationship skills/support systems, self-esteem, and social skills      Group Session Detail:  Pt actively participated in a structured occupational therapy group (Embracing Change) with a focus on development of coping skills, increasing self-awareness, embracing challenge, challenging perfectionism, building social relationships, and leisure involvement through a creative expressive task. Education was provided regarding the benefits of embracing change and developing a growth mindset.       Patient Response/Contribution:  cooperative with task, organized, supportive of peers, socially appropriate, safe use of materials/supplies, listened actively, expressed understanding of topic, attentive, and actively engaged      Patient Detail:     Pt arrived to group on time. Pt presented as pleasant and calm on this date. Pt received education on goal setting and embracing change, pt verbalized understanding. Pt often criticized his own work, stating \"mine is the worst one here.\"     Pt demonstrated safe use of materials throughout group. Before discussion, pt was pulled from group by additional provider. Pt did not return.         [id number] OT Group (2 or more in attendance)      Patient Active Problem List   Diagnosis    Other acne    Social phobia    Panic disorder without agoraphobia    Major depressive disorder, single episode, moderate (H)    Generalized anxiety disorder    Depression    Attention deficit hyperactivity disorder, predominantly inattentive type    Displacement of lumbar intervertebral disc without myelopathy    Anxiety    Suicidal ideation    Bipolar 1 disorder (H)         "

## 2025-07-31 NOTE — PLAN OF CARE
Group Dance Movement Psychotherapy  Start time:      1320  End time:      1415  Patient time total:     45 minutes    Number in Attendance:     4    Group Type: Dance Movement Psychotherapy  Group Topic Covered/Detail:       Group explored movement quality balance through various dance styles, as well as their metaphorical meanings for mental health symptoms.           Patient Response/Contribution:           Engaged         Patient Detail:  Patient was pleasant and good-natured despite repeatedly stating how awkward it felt to dance.  He joked it would be even more awkward to be the only one in the room not dancing.  He did make connections between music and lyrics he liked and the movement as it related to his mental health.  He expressed insight and self-awareness.  He was positively connected to peers in the group.                      Billing code:  Group Therapy [id number]

## 2025-07-31 NOTE — PLAN OF CARE
-Attending Provider: JEEVAN Lin CNP  -Voicemail Code: [id number]  -Team Note Due: Tuesday  -Next Steps:    Follow up on IRTS referrals   Set up psychiatry         Assessment/Intervention/Current Symtoms and Care Coordination:  Chart review and met with team, discussed pt progress, symptomology, and response to treatment.  Discussed the discharge plan and any potential impediments to discharge.  CTC made IRTS referrals to [facility] and [facility]  CTC provided update to [facility]  Discharge Plan or Goal:  Pending further stabilization, continued compliance with medications, continued activities of daily living, and development of a safe discharge plan.     Considerations: set up psychiatry, IRTS, board and lodge     Barriers to Discharge:  Impact of mental health symptoms on well being   Impact of mental health symptoms on activities of daily living  Need for medication monitoring and medication management     Referral Status:  [facility] faxed 7/31 for 3 locations  [facility] faxed 7/31 for 1 location     Legal Status:  Voluntary    County:   File Number:   Start and expiration date of commitment:     Contacts:

## 2025-07-31 NOTE — PROGRESS NOTES
"PSYCHIATRY  PROGRESS NOTE     DATE OF SERVICE   07/31/25          CHIEF COMPLAINT   \" Emotional.\"       SUBJECTIVE   Per nursing documentation:    Pt was visible in the lounge off and on. Was sociable with staff and peers. Presented with anxiety rated 8 over 10. Requested and received prn Hydroxyzine and later Seroquel. Pt endorsed suicidal ideation intermittently but contracted for safety. Pt denied auditory and visual hallucinations. Did not appear to be responding. Denied having any problem with eating or sleeping. Has been taking his medications as prescribed. Pt was pleasant and cooperative.     Patient is documented to sleep 7 hours overnight.    Per unit CTC documentation:    Unit CTC to facilitate aftercare plan.  Referrals for IRTS programs have been made and plan will be to set up outpatient psychiatry appointments.         OBJECTIVE   Met with patient in hospital room of unit 10N. Upon patient interview, patient reports their mood as, \" emotional.\"  Patient's affect appears blunted.  Patient does intermittently become tearful during psychiatric interview.  Patient reports he is having thoughts of regret that he did not seek help for his mental health symptoms sooner.  Patient states, \"I feel like it is too late.\"  Writer provided emotional support for patient and he appears receptive to this.  Patient also was agreeable to meet with unit therapist during his hospitalization and subsequently writer updated unit therapist of this.  Patient reports mild anxiety symptoms and that he plans to utilize PRN quetiapine for this.  Patient reports depression symptoms rated 9/10 (0=none, 10=severe). Denies any thoughts of SI, SIB, SA, intent or plan. Denies HI. Patient able to contract for safety.  Writer discussed that plan will be to increase Latuda starting 8/1/2025 to 40 mg p.o. daily with supper.  Patient in agreement with this plan.  Patient also acknowledges that in the past he has given up on medication being " effective for him prior to adequately trialing the psychiatric medication.  Patient denies any AH or VH. Patient denies any paranoid thoughts or delusions. Patient endorses difficulty sleeping overnight.  Writer discussed option of increasing Seroquel to 150 mg p.o. at bedtime to further target sleep emotion and patient in agreement with this.  Patient reports appetite is stable however at baseline they experience low appetite.  Denies any issue with bowel or bladder. Patient vital signs reviewed and noted to be stable.  Patient denies any acute medical concerns today. Patient has no other questions or concerns.        MEDICATIONS   Medications:  Scheduled Meds:  Current Facility-Administered Medications   Medication Dose Route Frequency Provider Last Rate Last Admin    amphetamine-dextroamphetamine (ADDERALL XR) ER cap 20 mg  20 mg Oral Daily FellingYanet APRN CNP   20 mg at 07/31/25 0725    amphetamine-dextroamphetamine (ADDERALL) per tablet 5 mg  5 mg Oral BID FellingYanet APRN CNP   5 mg at 07/31/25 0725    ascorbic acid (vitamin C) chewable tablet 250 mg  250 mg Oral Daily Rosy Gifford MD        lurasidone (LATUDA) tablet 20 mg  20 mg Oral Daily with supper FellingYanet APRN CNP   20 mg at 07/30/25 1813    nicotine (NICODERM CQ) 21 MG/24HR 24 hr patch 1 patch  1 patch Transdermal Daily FellingYanet APRN CNP   1 patch at 07/31/25 0725    QUEtiapine (SEROquel) tablet 100 mg  100 mg Oral At Bedtime KatinaingYanet APRN CNP   100 mg at 07/30/25 2105     Continuous Infusions:  Current Facility-Administered Medications   Medication Dose Route Frequency Provider Last Rate Last Admin     PRN Meds:.  Current Facility-Administered Medications   Medication Dose Route Frequency Provider Last Rate Last Admin    acetaminophen (TYLENOL) tablet 650 mg  650 mg Oral Q4H PRN Washington Villeda MD        alum & mag hydroxide-simethicone (MAALOX) suspension 30 mL  30 mL Oral Q4H PRN  "Washington Villeda MD        hydrOXYzine HCl (ATARAX) tablet 25 mg  25 mg Oral Q4H PRN Yanet Moreira APRN CNP   25 mg at 07/30/25 1704    nicotine polacrilex (NICORETTE) gum 4 mg  4 mg Buccal Q1H PRN Yanet Moreira APRN CNP        OLANZapine (zyPREXA) tablet 10 mg  10 mg Oral TID PRN Washington Villeda MD        Or    OLANZapine (zyPREXA) injection 10 mg  10 mg Intramuscular TID PRN Washington Villeda MD        polyethylene glycol (MIRALAX) Packet 17 g  17 g Oral Daily PRN Washington Villeda MD        QUEtiapine (SEROquel) tablet 25-50 mg  25-50 mg Oral TID PRN Yanet Moreira APRN CNP   25 mg at 07/31/25 0957    traZODone (DESYREL) tablet 50 mg  50 mg Oral At Bedtime PRN Washington Villeda MD   50 mg at 07/29/25 2155       Medication adherence issues: MS Med Adherence Y/N: Yes, Patient reports a history of noncompliance with psychiatric medications in the community  Medication side effects: MEDICATION SIDE EFFECTS: no side effects reported  Benefit: Yes / No: Yes       ROS   Pertinent items are noted in HPI.       MENTAL STATUS EXAM   Vitals: BP (!) 141/93 (BP Location: Right arm)   Pulse 106   Temp 97.5  F (36.4  C)   Resp 16   Ht 1.829 m (6')   Wt 84.5 kg (186 lb 4.8 oz)   SpO2 99%   BMI 25.27 kg/m      Appearance:  Casually groomed  Mood: Reports, \"emotional.\"    Affect: blunted intermittently tearful was congruent to speech  Suicidal Ideation: Passive SI with no plan or intent  Homicidal Ideation: PRESENT / ABSENT: absent   Thought process: Goal oriented and logical  Thought content: Does not endorse SI or HI  Fund of Knowledge: Average  Attention/Concentration: Fair  Language ability:  Intact  Memory: Appears intact, not formally assessed  Insight:  fair.  Judgement: fair  Orientation: Yes, x4  Psychomotor Behavior: normal or unremarkable    Muscle Strength and Tone: MuscleStrength: Normal  Gait and Station: Normal       LABS   personally " reviewed.     EKG completed on 7/30/2025 and results: Sinus rhythm, normal EKG, QTc: 431     Writer reviewed admission labs.  Patient noted to have slightly elevated anion gap: 17, lipids elevated including cholesterol: 207 plan will be to continue to monitor lab values and treat as clinically indicated.     Latest Reference Range & Units 07/29/25 14:40 07/30/25 07:31 07/30/25 13:33   Sodium 135 - 145 mmol/L  141    Potassium 3.4 - 5.3 mmol/L  4.3    Chloride 98 - 107 mmol/L  103    Carbon Dioxide (CO2) 22 - 29 mmol/L  21 (L)    Urea Nitrogen 6.0 - 20.0 mg/dL  14.2    Creatinine 0.67 - 1.17 mg/dL  0.86    GFR Estimate >60 mL/min/1.73m2  >90    Calcium 8.8 - 10.4 mg/dL  9.2    Anion Gap 7 - 15 mmol/L  17 (H)    Albumin 3.5 - 5.2 g/dL  4.3    Protein Total 6.4 - 8.3 g/dL  6.8    Alkaline Phosphatase 40 - 150 U/L  62    ALT 0 - 70 U/L  53    AST 0 - 45 U/L  43    Bilirubin Total <=1.2 mg/dL  0.5    Cholesterol <200 mg/dL  207 (H)    Glucose 70 - 99 mg/dL  112 (H)    HDL Cholesterol >=40 mg/dL  49    Estimated Average Glucose <117 mg/dL  108    Hemoglobin A1C <5.7 %  5.4    LDL Cholesterol Calculated <100 mg/dL  134 (H)    Non HDL Cholesterol <130 mg/dL  158 (H)    Triglycerides <150 mg/dL  122    Vitamin B12 232 - 1,245 pg/mL  365    Vitamin D, Total (25-Hydroxy) 20 - 50 ng/mL  22    WBC 4.0 - 11.0 10e3/uL  7.2    Hemoglobin 13.3 - 17.7 g/dL  14.4    Hematocrit 40.0 - 53.0 %  43.3    Platelet Count 150 - 450 10e3/uL  227    RBC Count 4.40 - 5.90 10e6/uL  4.83    MCV 78 - 100 fL  90    MCH 26.5 - 33.0 pg  29.8    MCHC 31.5 - 36.5 g/dL  33.3    RDW 10.0 - 15.0 %  12.9    Amphetamine Qual Urine Screen Negative  Screen Negative     Fentanyl Qual Urine Screen Negative  Screen Negative     Cocaine Urine Screen Negative  Screen Negative     Benzodiazepine Urine Screen Negative  Screen Negative     Opiates Qualitative Urine Screen Negative  Screen Negative     PCP Urine Screen Negative  Screen Negative     Cannabinoids Qual  "Urine Screen Negative  Screen Negative     Barbiturates Qual Urine Screen Negative  Screen Negative     EKG 12-lead, tracing only    Rpt (P)   Diastolic Blood Pressure mmHg   See Comment   Systolic Blood Pressure mmHg   See Comment   (L): Data is abnormally low  (H): Data is abnormally high  (P): Preliminary  Rpt: View report in Results Review for more information    No results found for: \"PHENYTOIN\", \"PHENOBARB\", \"VALPROATE\", \"CBMZ\"       DIAGNOSIS   Principal Problem:    Bipolar 1 disorder (H), currently depressed  Suicide attempt  Anxiety disorder, unspecified  ADHD, predominantly inattentive type  History of alcohol use disorder  History of MDD  History of opiate use disorder, in remission  Cannabis use    Clinically Significant Risk Factors                                # Overweight: Estimated body mass index is 25.27 kg/m  as calculated from the following:    Height as of this encounter: 1.829 m (6').    Weight as of this encounter: 84.5 kg (186 lb 4.8 oz).  , PRESENT ON ADMISSION       # Financial/Environmental Concerns: other (see comments) (no longer working, recently lost apartment)                Active Problem List:  Patient Active Problem List   Diagnosis    Other acne    Social phobia    Panic disorder without agoraphobia    Major depressive disorder, single episode, moderate (H)    Generalized anxiety disorder    Depression    Attention deficit hyperactivity disorder, predominantly inattentive type    Displacement of lumbar intervertebral disc without myelopathy    Anxiety    Suicidal ideation    Bipolar 1 disorder (H)          HOSPITAL COURSE AND ASSESSMENT   This is a 40 year old male with history of depression, anxiety Disorder, ADHD, Bipolar Disorder, Substance Use Disorder, YANA, panic, persistent depressive disorder and social phobia who presented to [facility] ED on 7/29/2025 reporting SI.  In the ED, patient reported that he put a bag over his head yesterday as a suicide attempt by suffocation.  " "Urine drug screening completed in the ED and negative for all substances. Patient was evaluated by provider in the ED and determined to be medically stable.  Patient subsequently admitted voluntarily to inpatient psychiatric unit 10N with attending Dr. Gifford for further psychiatric stabilization.  Upon admission, patient placed on status 15 monitoring.  Patient also placed on precautions: Suicide precautions.     At time of admission, patient reports noncompliance to psychiatric medications for the past 6 weeks.  Patient reports he did not take PTA medications due to, \"they were left in my apartment and now are in storage.\"  Patient reports that he stopped Abilify due to it being ineffective for mood disorder symptoms.  Patient also reports he trialed Prozac however did not find this medication helpful.  Patient reports that PTA Adderall has been effective for management of ADHD symptoms and that this medication helps significantly with concentration.  Patient reports he was taking Adderall XR 30 mg daily along with Adderall 10 mg instant release 2 times daily however that he has not taken this in approximately 6 weeks.  Subsequently, writer discussed option of decreasing Adderall XR to 20 mg p.o. daily and Adderall IR to 5 mg twice daily to target ADHD symptom management.  Patient in agreement with this.  Patient also agreeable to continue trial of Latuda which was started in the ED.  Patient also reports quetiapine to be helpful for sleep and anxiety symptoms.  Due to this, writer discussed option of initiating quetiapine 25-50 mg p.o. 3 times daily as needed for irritation, anxiety, and sleep which patient is in agreement with.  Patient reports engaging in nicotine use described as vaping all day.  Patient agreeable to utilizing nicotine patch and nicotine gum for nicotine withdrawal symptoms.  Patient also agreeable to an EKG to monitor heart rhythm.  Patient reports notably poor p.o. intake prior to present " to the hospital however that he is also concerned about weight gain; due to this writer placed dietitian consult to address poor p.o. intake and options for healthy snacks and writing menu options.       PLAN   1. Ongoing education given regarding diagnostic and treatment options with risks, benefits and alternatives and adequate verbalization of understanding.  2.  Medications:  Continue Adderall XR 20 mg p.o. daily to target ADHD symptoms; dose decrease due to patient reporting noncompliance to higher dosing prior to admission the hospital.  Continue Adderall IR 5 mg p.o. 2 times daily as patient reports noncompliance to medication prior to admitting to the hospital  Increase Latuda to 40 mg p.o. daily with supper starting 8/1/2025, must be taken with a minimum of 350 loly.  Indication: Mood disorder symptoms  Increase quetiapine to 150 mg p.o. at bedtime, indication: Anxiety and sleep promotion  Initiate nicotine 21 mg/24-hour patch, 1 patch transdermal daily for nicotine withdrawal symptoms  PRN nicotine gum 4 mg buccal every 1 hour as needed for nicotine withdrawal symptoms  PRN quetiapine 25-50 mg p.o. 3 times daily, first-line for anxiety symptoms, agitation, and sleep promotion  PRN olanzapine 10 mg p.o. 3 times daily as needed for agitation, order linked with backup olanzapine 10 mg IM injection  As needed trazodone 50 mg p.o. at bedtime as needed for sleep promotion, may repeat after 60 minutes  PRN hydroxyzine 25 mg p.o. over fours as needed for anxiety symptoms    3.  Labs/Imaging:  - None today    4. Consults:  -Hospitalist to be consulted as needed.    5. Precautions:  - Suicide precautions    6. Legal:  - Voluntary    7. Discharge planning:  -Per unit CTC        [facility] Patient able to contract for safety and Patient on precautions    Coordination of Care:   Treatment Plan reviewed and physician signed, Care discussed with Care/Treatment Team Members, Chart reviewed  and Patient seen      Re-Certification I certify that the inpatient psychiatric facility services furnished since the previous certification were, and continue to be, medically necessary for, either, treatment which could reasonably be expected to improve the patient s condition or diagnostic study and that the hospital records indicate that the services furnished were, either, intensive treatment services, admission and related services necessary for diagnostic study, or equivalent services.     I certify that the patient continues to need, on a daily basis, active treatment furnished directly by or requiring the supervision of inpatient psychiatric facility personnel.     I estimate 7-10 days of hospitalization is necessary for proper treatment of the patient. My plans for post-hospital care for this patient are  TBD     JEEVAN Chu CNP    -     07/31/25       -     1:50 PM      Total time  35 minutes with > 50%spent on coordination of cares and psycho-education.    This note was created with help of Dragon dictation system. Grammatical / typing errors are not intentional.    JEEVAN Chu CNP

## 2025-07-31 NOTE — DISCHARGE INSTRUCTIONS
Behavioral Discharge Planning and Instructions    Summary: You were admitted to [facility] on 7/29/2025 due to {Mental Health 1:370820}. You were treated by {[facility] Providers:662463} and provisionally discharged on ***/***/2025 to {[facility] D/C Locations:733385}.    You were dually committed to the [facility] and the Commissioner of Human Services on ***/***/***.  You were also court ordered to take the medications the doctor ordered for you. You are being discharged on a Provisional Discharge Agreement which shall remain in effect for the duration of the Commitment.  Your Commitment expires on ***/***/***.      Continue to follow with your Mental Health  - *** (phone: ***).     Main Diagnosis:   ***    Health Care Follow-up:   Appointment Date/Time: ***   Psychiatrist/Medication Management: ***     Address: ***   Phone Number: ***    Fax: ***    Appointment Date/Time: ***   Therapist: ***     Address: ***   Phone Number: ***     Fax: ***    Appointment Date/Time: ***   Primary Care Provider: ***     Address: ***   Phone Number: ***     Fax: ***    Appointment Date/Time: ***   Other: ***     Address: ***   Phone Number: ***  Fax: ***     If no appointments scheduled, explain ***    Attend all scheduled appointments with your outpatient providers. Call at least 24 hours in advance if you need to reschedule an appointment to ensure continued access to your outpatient providers.     Major Treatments, Procedures and Findings:  You were provided with: {Major Treatments:066232}    Symptoms to Report: Feeling more aggressive, increased confusion, losing more sleep, mood getting worse, or thoughts of suicide.    Early warning signs can include: Increased depression or anxiety sleep disturbances increased thoughts or behaviors of suicide or self-harm  increased unusual thinking, such as paranoia or hearing voices.    Safety and Wellness: Take all medicines as directed. Make no changes unless  "your doctor suggests them. Follow treatment recommendations. Refrain from alcohol and non-prescribed drugs. Ask your support system to help you reduce your access to items that could harm yourself or others. If there is a concern for safety, call 551.    [place] Patient Navigation Hub: [facility] Navigators work to be your point-of-contact for trustworthy and compassionate care from Inpatient services to [facility] Programmatic Care. We will provide resources and communication to help guide you into programmatic care. Ultimately, our goal is to be the one-stop-shop of communication, coordination, and support for your journey to programmatic care. Phone: [phone]    Community Resources:   General Mental Health Resources:   EmPATH - or Emergency Psychiatric Assessment, Treatment, and Healing: new emergency mental health ER that provides a calming environment with expert care so that people in crisis can get help quickly.  [facility] location: [address] [place] [address] [place], [place], [id number] [place] [address]-Walk-In Mental Health Clinic: provides immediate evaluation, therapy, and access to psychiatry. Initial assessment is required before medication can be prescribed  [address] [facility] [address] (On the 2nd Floor)     Monday-Thursday 8am-5pm and Friday 8am-4pm  Phone: [phone] Fax: [phone]  [facility] Walk-In Behavioral Health Clinic: provides immediate evaluation, therapy, and access to psychiatry    [address] [place] [address] [phone]  Daily 9am-9pm   National [place] on Mental Illness (STACY) [place]: Connect for help, to navigate the mental health system, and for support and for resources. Call: 8-076-STSV-Helps / 9-904-592-5198  Crisis Text Line: The 24/7 emergency service is available if you or someone you know is experiencing a psychiatric or mental health crisis. Text: \"[place]\" to 873952  [place] Warmline: Are you an adult " "needing support? Talk to a specialist who has firsthand experience living with a mental health condition. Call: 853.680.8282  Text: \"Support\" to [address]6788  [facility].org/support/[place]-warmline/  National Suicide Prevention Lifeline: The 24/7 lifeline provides support when in distress, has prevention and crisis resources for you or your loved ones, and resources for professionals. Call 8-062-244-FIZN (1780)  Peer Support Connection Warmlines: Dwob-dh-wcjd telephone support that s safe and supportive. Open 5 p.m. to 9 a.m. Call or text: 1-140.718.4738   COVID Cares Stress Phone Support Service. Any [place] experiencing stress can call 158-SFZU9PP (929-583-2379) for free telephone support from 9am to 9pm every day. The service is a collaboration with volunteers from the [place] Psychiatric Society, the [place] Psychological Association, the [facility] Psychologists, and [facility]. The free service is also accessible at [employer] where searchers can also find psychiatric and mental health services availability and real-time Substance Use Disorder Treatment program openings.  Adult Rehabilitative Mental Health Services (ARMHS): https://[place].gov/dhs/partners-and-providers/policies-procedures/adult-mental-health/adult-rehabilitative-mental-health-services/armhs-certified-providers/  { RESOURCES Mb:253772}    Outpatient Psychiatry/Therapy Resources:   [facility] - (phone: [phone]) https://www.[employer]/care/specialty/mental-behavioral-health/  https://www.[employer]/care/find/doctors/psychologists/  [facility] Counsel[facility] - (phone: 5-164-ZGQOQAYS) https://www.[employer].org/treatments/Counseling-Adult  [facility] - (phone: [phone]) https://hoozin.[employer] Experience/  [facility] - (phone: [phone])  https://[facility] FansmnGreenDot Trans/  [facility] - (phone: " 9-069-489-7334) https://www.[employer]/  [facility] - (phone: 486.210.6302) https://www.LumaS[employer] Technologiesetherapy.[employer]/  [facility] - (phone: [phone]) https://Oversee.[employer]/  Walk[facility]in Counseling Center - (phone: [phone]) https://walkin.org/    General Medication Instructions:   See your medication sheet(s) for instructions.   Take all medicines as directed.  Make no changes unless your doctor suggests them.   Go to all your doctor visits.  Be sure to have all your required lab tests. This way, your medicines can be refilled on time.  Do not use any drugs not prescribed by your doctor.  Avoid alcohol.    Advance Directives:   Scanned document on file with [facility]? No scanned doc  Is document scanned? No. Copy Requested.  Honoring Choices Your Rights Handout: Informed and given  Was more information offered? Pt declined    The Treatment team has appreciated the opportunity to work with you. If you have any questions or concerns about your recent admission, you can contact the unit which can receive your call 24 hours a day, 7 days a week. They will be able to get in touch with a Provider if needed. The unit number is [phone].

## 2025-07-31 NOTE — PLAN OF CARE
BEH IP Unit Acuity Rating Score (UARS)  Patient is given one point for every criteria they meet.    CRITERIA SCORING   On a 72 hour hold, court hold, committed, stay of commitment, or revocation. 0    Patient LOS on BEH unit exceeds 20 days. 0  LOS: 2   Patient under guardianship, 55+, otherwise medically complex, or under age 11. 0   Suicide ideation without relief of precipitating factors. 1   Current plan for suicide. 1   Current plan for homicide. 0   Imminent risk or actual attempt to seriously harm another without relief of factors precipitating the attempt. 0   Severe dysfunction in daily living (ex: complete neglect for self care, extreme disruption in vegetative function, extreme deterioration in social interactions). 0   Recent (last 7 days) or current physical aggression in the ED or on unit. 0   Restraints or seclusion episode in past 72 hours. 0   Recent (last 7 days) or current verbal aggression, agitation, yelling, etc., while in the ED or unit. 0   Active psychosis. 0   Need for constant or near constant redirection (from leaving, from others, etc).  0   Intrusive or disruptive behaviors. 0   Patient requires 3 or more hours of individualized nursing care per 8-hour shift (i.e. for ADLs, meds, therapeutic interventions). 0   TOTAL 2

## 2025-07-31 NOTE — PROVIDER NOTIFICATION
07/31/25 0600   Sleep/Rest   Night Time # Hours 7 hours     Pt had a quiet night with no behavioral or safety concerns. Pt was observed sleeping almost the entire night, no acute events noted or reported.

## 2025-07-31 NOTE — PLAN OF CARE
"  Rehab Group    Start time: 1015  End time: 1115  Patient time total: 60 minutes    attended full group    #5 attended   Group Type: occupational therapy and OT Clinic   Group Topic Covered: balanced lifestyle, coping skills, healthy leisure time, problem solving, relationship skills/support systems, self-esteem, and social skills     Group Session Detail:  OT: Pt actively participated in occupational therapy clinic to facilitate coping skill exploration, creative expression within personally meaningful activities, and clinical observation of social, cognitive, and kinesthetic performance skills.     Patient Response/Contribution:  cooperative with task, organized, supportive of peers, socially appropriate, safe use of materials/supplies, attentive, and actively engaged     Patient Detail:    Pt arrived to group on time. Pt presented as pleasant and calm on this date. Pt engaged in a canvas project, which pt independently decided to begin. Pt often observed to criticize his own work, requested to discard at the end of group.     Pt did initiate social interactions throughout group. Pt often observed to be laughing and joking with peers. However, pt stated multiple times that his \"mood was down and depressed.\" Pt finished his canvas with around 20 minutes left in group, observed to just look at painting for the rest of group despite encouragement to continue. Pt able to clean materials and workspace independently at the end of group.         [id number] OT Group (2 or more in attendance)      Patient Active Problem List   Diagnosis    Other acne    Social phobia    Panic disorder without agoraphobia    Major depressive disorder, single episode, moderate (H)    Generalized anxiety disorder    Depression    Attention deficit hyperactivity disorder, predominantly inattentive type    Displacement of lumbar intervertebral disc without myelopathy    Anxiety    Suicidal ideation    Bipolar 1 disorder (H)       "

## 2025-07-31 NOTE — PLAN OF CARE
Problem: Suicide Risk  Goal: Absence of Self-Harm  Outcome: Progressing   Goal Outcome Evaluation:    Pt was visible in the lounge off and on. Was sociable with staff and peers. Presented with anxiety rated 8 over 10. Requested and received prn Hydroxyzine and later Seroquel. Pt endorsed suicidal ideation intermittently but contracted for safety. Pt denied auditory and visual hallucinations. Did not appear to be responding. Denied having any problem with eating or sleeping. Has been taking his medications as prescribed. Pt was pleasant and cooperative.   Plan: Status 15s; Build trust with pt. Continue to build on strengths. Encourage compliance and healthy coping.

## 2025-07-31 NOTE — CARE PLAN
Rehab Group    Start time: 1610  End time: 1655  Patient time total: 40 minutes    attended full group    #4 attended   Group Type: occupational therapy   Group Topic Covered: balanced lifestyle, cognitive activities, coping skills, co-occurring illness, educational support, emotional regulation, healthy leisure time, substance use/recovery , self-care, self-esteem, and social skills       Group Session Detail:  Education and group discussion provided on the benefits of journaling for recovery from mental health and addiction, reviewing a variety of journaling types and with hands on Journaling practice of stream of consciousness journaling, gratitude journaling, and photo journaling for concentration, encouraging sharing of thoughts/feelings, fostering hope for a sustainable recovery, processing, improving insight and self-awareness, coping, mood stabilization, reality-based activity, follow through, healthy leisure exploration, self-care, building self-esteem, and socialization.       Patient Response/Contribution:  socially appropriate and actively engaged       Patient Detail:  Pt was pleasant and invested in the group activity.  He contributed to the group discussion actively and was insightful with his disclosures.  Pt also completed all of the journaling exercises with the rest of the group.  He shared at the end of the group session that the stream of consciousness journaling activity was his favorite of the ones tried today.  Pt thanked therapist for the group at the end of the session stating he found it to be helpful.  Pt verbalized some self doubt while sharing, and fear that he was being too negative.  Pt was reassured this was not the case and he seemed to be surprised at the positive feedback.  Pt was praised for sharing honestly.          [id number] OT Group (2 or more in attendance)      Patient Active Problem List   Diagnosis    Other acne    Social phobia    Panic disorder without agoraphobia     Major depressive disorder, single episode, moderate (H)    Generalized anxiety disorder    Depression    Attention deficit hyperactivity disorder, predominantly inattentive type    Displacement of lumbar intervertebral disc without myelopathy    Anxiety    Suicidal ideation    Bipolar 1 disorder (H)

## 2025-07-31 NOTE — PLAN OF CARE
He is active in the lounge area and is talkative towards others.  States that he did not sleep well last night and recommended to take off the nicotine patch as a suggestion.  He denies pain/discomfort and cold, flu like symptoms when asked.  States that his anxiety and depression are higher, especially last evening.  He admits to suicidal ideation, but does not have a specific plan and agrees to contract for safety while hospitalized.  He admits to racing thoughts, but said is not having concentration difficulty.  He denies hallucinations and homicidal ideations when asked.  States he is looking forward to talking with his  about housing assistance.  PRN seroquel for anxiety after breakfast.  He agrees to come to staff for concerns.  Stated that his insurance will cover adderall 20 mg and not 10 mg, education given to discuss with the NP as they will prescribe his discharge medications.  He showered in the shift.

## 2025-07-31 NOTE — PLAN OF CARE
"  Rehab Group    Start time: 1115  End time: 1205  Patient time total: 45 minutes    attended full group    #5 attended   Group Type: occupational therapy and general health and coping   Group Topic Covered: balanced lifestyle, cognitive activities, coping skills, healthy leisure time, mindfulness, problem solving, relationship skills/support systems, and self-esteem     Group Session Detail:  Pt actively participated in a structured occupational therapy topic group involving identification of coping skills beginning with every letter of the alphabet facilitated through group discussion. Pt consistently contributed ideas of positive coping skills, and was receptive and respectful of ideas contributed by peers.     Patient Response/Contribution:  cooperative with task, organized, supportive of peers, socially appropriate, safe use of materials/supplies, listened actively, expressed understanding of topic, attentive, and actively engaged     Patient Detail:    Pt arrived to group on time. Pt presented as pleasant and calm. Pt actively participated and identified a coping skill associated with each letter of the alphabet. Pt was then an active participate in the discussion, and often shared coping skills that were useful and on topic. Pt coping skills were often activities, such as hiking or golfing. Pt did grab a \"coping skills toolbox\" worksheet at end of group, and encouragement was provided to complete independently. Pt then exited group on this date.         [id number] OT Group (2 or more in attendance)      Patient Active Problem List   Diagnosis    Other acne    Social phobia    Panic disorder without agoraphobia    Major depressive disorder, single episode, moderate (H)    Generalized anxiety disorder    Depression    Attention deficit hyperactivity disorder, predominantly inattentive type    Displacement of lumbar intervertebral disc without myelopathy    Anxiety    Suicidal ideation    Bipolar 1 disorder (H) "

## 2025-08-01 LAB
ATRIAL RATE - MUSE: 98 BPM
DIASTOLIC BLOOD PRESSURE - MUSE: NORMAL MMHG
INTERPRETATION ECG - MUSE: NORMAL
P AXIS - MUSE: 47 DEGREES
PR INTERVAL - MUSE: 152 MS
QRS DURATION - MUSE: 94 MS
QT - MUSE: 338 MS
QTC - MUSE: 431 MS
R AXIS - MUSE: 64 DEGREES
SYSTOLIC BLOOD PRESSURE - MUSE: NORMAL MMHG
T AXIS - MUSE: 36 DEGREES
VENTRICULAR RATE- MUSE: 98 BPM

## 2025-08-01 PROCEDURE — 250N000013 HC RX MED GY IP 250 OP 250 PS 637: Performed by: PSYCHIATRY & NEUROLOGY

## 2025-08-01 PROCEDURE — 97150 GROUP THERAPEUTIC PROCEDURES: CPT | Mod: GO

## 2025-08-01 PROCEDURE — 250N000013 HC RX MED GY IP 250 OP 250 PS 637

## 2025-08-01 PROCEDURE — 124N000002 HC R&B MH UMMC

## 2025-08-01 PROCEDURE — 99232 SBSQ HOSP IP/OBS MODERATE 35: CPT

## 2025-08-01 RX ORDER — QUETIAPINE FUMARATE 50 MG/1
50 TABLET, FILM COATED ORAL 3 TIMES DAILY PRN
Status: DISCONTINUED | OUTPATIENT
Start: 2025-08-01 | End: 2025-08-08 | Stop reason: HOSPADM

## 2025-08-01 RX ORDER — QUETIAPINE FUMARATE 25 MG/1
25 TABLET, FILM COATED ORAL 3 TIMES DAILY PRN
Status: DISCONTINUED | OUTPATIENT
Start: 2025-08-01 | End: 2025-08-08 | Stop reason: HOSPADM

## 2025-08-01 RX ORDER — ACETAMINOPHEN, ASPIRIN AND CAFFEINE 250; 250; 65 MG/1; MG/1; MG/1
1 TABLET ORAL 2 TIMES DAILY PRN
Status: DISCONTINUED | OUTPATIENT
Start: 2025-08-01 | End: 2025-08-08 | Stop reason: HOSPADM

## 2025-08-01 RX ORDER — QUETIAPINE FUMARATE 200 MG/1
200 TABLET, FILM COATED ORAL AT BEDTIME
Status: DISCONTINUED | OUTPATIENT
Start: 2025-08-01 | End: 2025-08-08 | Stop reason: HOSPADM

## 2025-08-01 RX ADMIN — ACETAMINOPHEN, ASPIRIN, CAFFEINE 1 TABLET: 250; 65; 250 TABLET, FILM COATED ORAL at 14:29

## 2025-08-01 RX ADMIN — HYDROXYZINE HYDROCHLORIDE 25 MG: 25 TABLET, FILM COATED ORAL at 14:32

## 2025-08-01 RX ADMIN — DEXTROAMPHETAMINE SACCHARATE, AMPHETAMINE ASPARTATE, DEXTROAMPHETAMINE SULFATE AND AMPHETAMINE SULFATE 5 MG: 1.25; 1.25; 1.25; 1.25 TABLET ORAL at 12:18

## 2025-08-01 RX ADMIN — NICOTINE 1 PATCH: 21 PATCH, EXTENDED RELEASE TRANSDERMAL at 08:05

## 2025-08-01 RX ADMIN — Medication 250 MG: at 08:05

## 2025-08-01 RX ADMIN — DEXTROAMPHETAMINE SACCHARATE, AMPHETAMINE ASPARTATE, DEXTROAMPHETAMINE SULFATE AND AMPHETAMINE SULFATE 5 MG: 1.25; 1.25; 1.25; 1.25 TABLET ORAL at 08:05

## 2025-08-01 RX ADMIN — DEXTROAMPHETAMINE SULFATE, DEXTROAMPHETAMINE SACCHARATE, AMPHETAMINE SULFATE AND AMPHETAMINE ASPARTATE 20 MG: 5; 5; 5; 5 CAPSULE, EXTENDED RELEASE ORAL at 08:05

## 2025-08-01 RX ADMIN — QUETIAPINE FUMARATE 25 MG: 25 TABLET ORAL at 17:58

## 2025-08-01 RX ADMIN — ACETAMINOPHEN 650 MG: 325 TABLET ORAL at 12:18

## 2025-08-01 RX ADMIN — QUETIAPINE FUMARATE 150 MG: 100 TABLET ORAL at 21:23

## 2025-08-01 RX ADMIN — QUETIAPINE FUMARATE 25 MG: 25 TABLET ORAL at 12:52

## 2025-08-01 RX ADMIN — LURASIDONE HYDROCHLORIDE 40 MG: 40 TABLET, FILM COATED ORAL at 17:34

## 2025-08-01 RX ADMIN — QUETIAPINE FUMARATE 25 MG: 25 TABLET ORAL at 17:39

## 2025-08-01 ASSESSMENT — ACTIVITIES OF DAILY LIVING (ADL)
ADLS_ACUITY_SCORE: 15
ORAL_HYGIENE: INDEPENDENT
ADLS_ACUITY_SCORE: 15
DRESS: SCRUBS (BEHAVIORAL HEALTH);INDEPENDENT
ADLS_ACUITY_SCORE: 15
HYGIENE/GROOMING: INDEPENDENT
ADLS_ACUITY_SCORE: 15

## 2025-08-01 NOTE — PLAN OF CARE
Problem: Adult Behavioral Health Plan of Care  Goal: Optimized Coping Skills in Response to Life Stressors  Outcome: Progressing    Pt presented as alert and oriented to place and self throughout shift.  They were primarily visible in the milieu during the day.  Pt did attend OT groups when available.   They were dressed appropriately and appeared adequately hygenic.  Pt is eating and drinking adequately.  They were compliant with their scheduled medications.  Pt requested PRN Tylenol and Excedrin for headache throughout the shift as well as Seroquel and hydroxyzine for elevated anxiety..  VSS and no issues with bowel or bladder.  Pt endorsed moderate anxiety and depression.  They did not endorse any symptoms of psychosis.  Pt stated having continued migraine headache pain, however denied any interventions.  No side effects to medications noted this shift.    Goal Outcome Evaluation:    Plan of Care Reviewed With: patient

## 2025-08-01 NOTE — CONSULTS
Consulted to run a test claim for Latuda, Adderal ER, & Adderal IR.    Patient has pharmacy benefits through [employer]. Per insurance, the following are covered and preferred under the patient's plans:     Amphetamine Salts ER caps - $0  Amphetampine-Dextroamphetamine IR tabs - $0  Lurasidone tabs - $0  1/2 tab limit on all strengths except 80mg & 120mg       Please feel free to contact me with any other test claims, prior authorizations, or insurance questions regarding outpatient medications.     Thanks!      April Almaraz, [facility]  Discharge Pharmacy Liaison  [facility] Discharge Pharmacy  Pronouns: She/Her/Hers    Securely message with [employer], Epic Secure Chat, or [employer]  Phone: [phone]  Fax: [phone]  [email or web address]    Disclaimer: Pharmacy test claims are estimates at date/time of service and may not reflect final costs. Suggested alternatives aim to be cost-effective but may not be therapeutically equivalent as this consult is informational and does not constitute medical advice. Clinical decisions should be made by qualified healthcare providers.

## 2025-08-01 NOTE — PLAN OF CARE
Problem: Anxiety Signs/Symptoms  Goal: Optimized Energy Level (Anxiety Signs/Symptoms)  Outcome: Progressing   Goal Outcome Evaluation:    Pt was visible in the lounge for most of the shift. Endorsed anxiety and depression both rated 5 over 10. Reported having migraine and was given prn Tylenol which pt described as ineffective. Pt was pleasant and sociable with staff and peers. Ate very little of his dinner. Denied auditory and visual hallucinations as well as SI/SIB/HI. Took his medications as prescribed. Pt wants his Seroquel increased to 150 mg.   Plan: Status 15s; Build trust with pt. Continue to build on strengths. Encourage compliance and healthy coping.

## 2025-08-01 NOTE — PLAN OF CARE
"  Rehab Group    Start time: 1115  End time: 1200  Patient time total: 40 minutes    attended full group    #5 attended   Group Type: occupational therapy, general health and coping, and self-care   Group Topic Covered: balanced lifestyle, coping skills, healthy leisure time, hygiene, mindfulness, relationship skills/support systems, self-care, and self-esteem     Group Session Detail:  Pt actively participated in a structured occupational therapy topic group involving education of self-care categories for general health and wellbeing. Pt learned a goal technique acronym (SMART), and received education on the importance of self-care. Pt participated in active discussion and worksheets to identify self-care goals and create a self-care plan in order to support positive habits and routines, increase self-awareness, and manage symptoms.      Patient Response/Contribution:  positive affect, cooperative with task, organized, supportive of peers, socially appropriate, safe use of materials/supplies, listened actively, expressed understanding of topic, attentive, and actively engaged     Patient Detail:    Pt arrived to group a few minutes late. Pt presented as upbeat and pleasant. Pt actively participated in identification of self-care goal, which was \"to engage in a gratitude or affirmations once daily through recalling one positive thing that happened.\" Pt able to identify specific ways to meet goals. Pt actively listened to peers when they were discussing. Pt took self-care plan with him at the end of group.         [id number] OT Group (2 or more in attendance)      Patient Active Problem List   Diagnosis    Other acne    Social phobia    Panic disorder without agoraphobia    Major depressive disorder, single episode, moderate (H)    Generalized anxiety disorder    Depression    Attention deficit hyperactivity disorder, predominantly inattentive type    Displacement of lumbar intervertebral disc without myelopathy    " Anxiety    Suicidal ideation    Bipolar 1 disorder (H)

## 2025-08-01 NOTE — PROGRESS NOTES
"PSYCHIATRY  PROGRESS NOTE     DATE OF SERVICE   8/1/2025       CHIEF COMPLAINT   \" Emotional.\"       SUBJECTIVE   Per nursing documentation:    Pt was visible in the lounge for most of the shift. Endorsed anxiety and depression both rated 5 over 10. Reported having migraine and was given prn Tylenol which pt described as ineffective. Pt was pleasant and sociable with staff and peers. Ate very little of his dinner. Denied auditory and visual hallucinations as well as SI/SIB/HI. Took his medications as prescribed. Pt wants his Seroquel increased to 150 mg.   Plan: Status 15s; Build trust with pt. Continue to build on strengths. Encourage compliance and healthy coping.     Patient is documented to sleep 6.75 hours overnight.    Per unit CTC documentation:    Unit CTC to facilitate aftercare plan.  Referrals for IRTS programs have been made and plan will be to set up outpatient psychiatry appointments.         OBJECTIVE   Met with patient in hospital room of unit 10N. Upon patient interview, patient reports their mood as, \" emotional.\"  Patient's affect appears blunted. Patient reports that he feels the emotions are therapeutic and that his mood is overall slightly improving today.   Patient reports anxiety symptoms rated severity of 7/10  (0=none, 10=severe) and that he plans to utilize PRN quetiapine for this.  Patient reports depression symptoms rated 8/10 (0=none, 10=severe).  Patient does endorse some thoughts of passive SI which occur immediately when he wakes up and intermittently occurs throughout the day.  Patient denies any active thoughts of SI, SIB, SA, intent or plan. Denies HI. Patient able to contract for safety.  Writer discussed that plan will be to increase Latuda starting 8/1/2025 to 40 mg p.o. daily with supper to further target mood disorder symptoms.  Patient in agreement with this plan.  Patient denies any AH or VH. Patient denies any paranoid thoughts or delusions. Patient endorses ongoing " difficulty sleeping overnight.  Writer discussed option of increasing Seroquel to 150-200 mg p.o. at bedtime to further target sleep emotion and patient in agreement with this.  Patient reports appetite is stable however at baseline they experience low appetite.  Denies any issue with bowel or bladder. Patient vital signs reviewed and noted to be stable.  Patient does report experiencing a migraine which starts behind his left eye and goes to the left side of his face and neck.  Patient reports he rarely gets migraines however has experienced this in the past.  Patient rates severity of migraine pain at 7/10 (0=none, 10=severe).  Patient reports he utilized PRN Tylenol however was not effective for management of the migraine pain.  Subsequently, writer discussed option of initiating Excedrin Migraine 1 tablet p.o. 2 times daily to target headaches and patient in agreement to trial this.  Patient denies any acute medical concerns today. Patient has no other questions or concerns.        MEDICATIONS   Medications:  Scheduled Meds:  Current Facility-Administered Medications   Medication Dose Route Frequency Provider Last Rate Last Admin    amphetamine-dextroamphetamine (ADDERALL XR) ER cap 20 mg  20 mg Oral Daily Yanet Moreira APRN CNP   20 mg at 08/01/25 0805    amphetamine-dextroamphetamine (ADDERALL) per tablet 5 mg  5 mg Oral BID Yanet Moreira APRN CNP   5 mg at 08/01/25 1218    ascorbic acid (vitamin C) chewable tablet 250 mg  250 mg Oral Daily Rosy Gifford MD   250 mg at 08/01/25 0805    lurasidone (LATUDA) tablet 40 mg  40 mg Oral Daily with supper Yanet Moreira APRN CNP        nicotine (NICODERM CQ) 21 MG/24HR 24 hr patch 1 patch  1 patch Transdermal Daily Yanet Moreira APRN CNP   1 patch at 08/01/25 0805    QUEtiapine (SEROquel) tablet 150 mg  150 mg Oral At Bedtime Yanet Moreira APRN CNP        Or    QUEtiapine (SEROquel) tablet 200 mg  200 mg Oral At Bedtime Lillie  JEEVAN Frederick CNP         Continuous Infusions:  Current Facility-Administered Medications   Medication Dose Route Frequency Provider Last Rate Last Admin     PRN Meds:.  Current Facility-Administered Medications   Medication Dose Route Frequency Provider Last Rate Last Admin    acetaminophen (TYLENOL) tablet 650 mg  650 mg Oral Q4H PRN Washington Villeda MD   650 mg at 08/01/25 1218    alum & mag hydroxide-simethicone (MAALOX) suspension 30 mL  30 mL Oral Q4H PRN Washington Villeda MD        aspirin-acetaminophen-caffeine (EXCEDRIN MIGRAINE) per tablet 1 tablet  1 tablet Oral BID PRN Yanet Moreira APRN CNP   1 tablet at 08/01/25 1429    hydrOXYzine HCl (ATARAX) tablet 25 mg  25 mg Oral Q4H PRN Yanet Moreira APRN CNP   25 mg at 08/01/25 1432    nicotine polacrilex (NICORETTE) gum 4 mg  4 mg Buccal Q1H PRN Yanet Moreira APRN CNP        OLANZapine (zyPREXA) tablet 10 mg  10 mg Oral TID PRN Washington Villeda MD        Or    OLANZapine (zyPREXA) injection 10 mg  10 mg Intramuscular TID PRWashington Ortiz MD        polyethylene glycol (MIRALAX) Packet 17 g  17 g Oral Daily PRN Washington Villeda MD        QUEtiapine (SEROquel) tablet 25-50 mg  25-50 mg Oral TID PRN Yanet Moreira APRN CNP   25 mg at 08/01/25 1252    traZODone (DESYREL) tablet 50 mg  50 mg Oral At Bedtime PRN Washington Villeda MD   50 mg at 07/29/25 2157       Medication adherence issues: MS Med Adherence Y/N: Yes, Patient reports a history of noncompliance with psychiatric medications in the community  Medication side effects: MEDICATION SIDE EFFECTS: no side effects reported  Benefit: Yes / No: Yes       ROS   Pertinent items are noted in HPI.       MENTAL STATUS EXAM   Vitals: /84   Pulse 97   Temp 98.3  F (36.8  C) (Oral)   Resp 16   Ht 1.829 m (6')   Wt 84.5 kg (186 lb 4.8 oz)   SpO2 100%   BMI 25.27 kg/m      Appearance:  Casually groomed  Mood:  "Reports, \"emotional.\"    Affect: blunted was congruent to speech  Suicidal Ideation: Passive SI with no plan or intent  Homicidal Ideation: PRESENT / ABSENT: absent   Thought process: Goal oriented and logical  Thought content: Passive SI with no plan or intent, able to contract for safety.  Denies HI  Fund of Knowledge: Average  Attention/Concentration: Fair  Language ability:  Intact  Memory: Appears intact, not formally assessed  Insight:  fair.  Judgement: fair  Orientation: Yes, x4  Psychomotor Behavior: normal or unremarkable    Muscle Strength and Tone: MuscleStrength: Normal  Gait and Station: Normal       LABS   personally reviewed.     EKG completed on 7/30/2025 and results: Sinus rhythm, normal EKG, QTc: 431     Writer reviewed admission labs.  Patient noted to have slightly elevated anion gap: 17, lipids elevated including cholesterol: 207 plan will be to continue to monitor lab values and treat as clinically indicated.     Latest Reference Range & Units 07/29/25 14:40 07/30/25 07:31 07/30/25 13:33   Sodium 135 - 145 mmol/L  141    Potassium 3.4 - 5.3 mmol/L  4.3    Chloride 98 - 107 mmol/L  103    Carbon Dioxide (CO2) 22 - 29 mmol/L  21 (L)    Urea Nitrogen 6.0 - 20.0 mg/dL  14.2    Creatinine 0.67 - 1.17 mg/dL  0.86    GFR Estimate >60 mL/min/1.73m2  >90    Calcium 8.8 - 10.4 mg/dL  9.2    Anion Gap 7 - 15 mmol/L  17 (H)    Albumin 3.5 - 5.2 g/dL  4.3    Protein Total 6.4 - 8.3 g/dL  6.8    Alkaline Phosphatase 40 - 150 U/L  62    ALT 0 - 70 U/L  53    AST 0 - 45 U/L  43    Bilirubin Total <=1.2 mg/dL  0.5    Cholesterol <200 mg/dL  207 (H)    Glucose 70 - 99 mg/dL  112 (H)    HDL Cholesterol >=40 mg/dL  49    Estimated Average Glucose <117 mg/dL  108    Hemoglobin A1C <5.7 %  5.4    LDL Cholesterol Calculated <100 mg/dL  134 (H)    Non HDL Cholesterol <130 mg/dL  158 (H)    Triglycerides <150 mg/dL  122    Vitamin B12 232 - 1,245 pg/mL  365    Vitamin D, Total (25-Hydroxy) 20 - 50 ng/mL  22    WBC 4.0 " "- 11.0 10e3/uL  7.2    Hemoglobin 13.3 - 17.7 g/dL  14.4    Hematocrit 40.0 - 53.0 %  43.3    Platelet Count 150 - 450 10e3/uL  227    RBC Count 4.40 - 5.90 10e6/uL  4.83    MCV 78 - 100 fL  90    MCH 26.5 - 33.0 pg  29.8    MCHC 31.5 - 36.5 g/dL  33.3    RDW 10.0 - 15.0 %  12.9    Amphetamine Qual Urine Screen Negative  Screen Negative     Fentanyl Qual Urine Screen Negative  Screen Negative     Cocaine Urine Screen Negative  Screen Negative     Benzodiazepine Urine Screen Negative  Screen Negative     Opiates Qualitative Urine Screen Negative  Screen Negative     PCP Urine Screen Negative  Screen Negative     Cannabinoids Qual Urine Screen Negative  Screen Negative     Barbiturates Qual Urine Screen Negative  Screen Negative     EKG 12-lead, tracing only    Rpt (P)   Diastolic Blood Pressure mmHg   See Comment   Systolic Blood Pressure mmHg   See Comment   (L): Data is abnormally low  (H): Data is abnormally high  (P): Preliminary  Rpt: View report in Results Review for more information    No results found for: \"PHENYTOIN\", \"PHENOBARB\", \"VALPROATE\", \"CBMZ\"       DIAGNOSIS   Principal Problem:    Bipolar 1 disorder (H), currently depressed  Suicide attempt  Anxiety disorder, unspecified  ADHD, predominantly inattentive type  History of alcohol use disorder  History of MDD  History of opiate use disorder, in remission  Cannabis use    Clinically Significant Risk Factors                                # Overweight: Estimated body mass index is 25.27 kg/m  as calculated from the following:    Height as of this encounter: 1.829 m (6').    Weight as of this encounter: 84.5 kg (186 lb 4.8 oz).  , PRESENT ON ADMISSION       # Financial/Environmental Concerns: other (see comments) (no longer working, recently lost apartment)                Active Problem List:  Patient Active Problem List   Diagnosis    Other acne    Social phobia    Panic disorder without agoraphobia    Major depressive disorder, single episode, moderate " "(H)    Generalized anxiety disorder    Depression    Attention deficit hyperactivity disorder, predominantly inattentive type    Displacement of lumbar intervertebral disc without myelopathy    Anxiety    Suicidal ideation    Bipolar 1 disorder (H)          HOSPITAL COURSE AND ASSESSMENT   This is a 40 year old male with history of depression, anxiety Disorder, ADHD, Bipolar Disorder, Substance Use Disorder, YANA, panic, persistent depressive disorder and social phobia who presented to [facility] ED on 7/29/2025 reporting SI.  In the ED, patient reported that he put a bag over his head yesterday as a suicide attempt by suffocation.  Urine drug screening completed in the ED and negative for all substances. Patient was evaluated by provider in the ED and determined to be medically stable.  Patient subsequently admitted voluntarily to inpatient psychiatric unit 10N with attending Dr. Gifford for further psychiatric stabilization.  Upon admission, patient placed on status 15 monitoring.  Patient also placed on precautions: Suicide precautions.     At time of admission, patient reports noncompliance to psychiatric medications for the past 6 weeks.  Patient reports he did not take PTA medications due to, \"they were left in my apartment and now are in storage.\"  Patient reports that he stopped Abilify due to it being ineffective for mood disorder symptoms.  Patient also reports he trialed Prozac however did not find this medication helpful.  Patient reports that PTA Adderall has been effective for management of ADHD symptoms and that this medication helps significantly with concentration.  Patient reports he was taking Adderall XR 30 mg daily along with Adderall 10 mg instant release 2 times daily however that he has not taken this in approximately 6 weeks.  Subsequently, writer discussed option of decreasing Adderall XR to 20 mg p.o. daily and Adderall IR to 5 mg twice daily to target ADHD symptom management.  Patient in " agreement with this.  Patient also agreeable to continue trial of Latuda which was started in the ED.  Patient also reports quetiapine to be helpful for sleep and anxiety symptoms.  Due to this, writer discussed option of initiating quetiapine 25-50 mg p.o. 3 times daily as needed for irritation, anxiety, and sleep which patient is in agreement with.  Patient reports engaging in nicotine use described as vaping all day.  Patient agreeable to utilizing nicotine patch and nicotine gum for nicotine withdrawal symptoms.  Patient also agreeable to an EKG to monitor heart rhythm.  Patient reports notably poor p.o. intake prior to present to the hospital however that he is also concerned about weight gain; due to this writer placed dietitian consult to address poor p.o. intake and options for healthy snacks and writing menu options.       PLAN   1. Ongoing education given regarding diagnostic and treatment options with risks, benefits and alternatives and adequate verbalization of understanding.  2.  Medications:  Continue Adderall XR 20 mg p.o. daily to target ADHD symptoms; dose decrease due to patient reporting noncompliance to higher dosing prior to admission the hospital.  Continue Adderall IR 5 mg p.o. 2 times daily as patient reports noncompliance to medication prior to admitting to the hospital  Increase Latuda to 40 mg p.o. daily with supper starting 8/1/2025, must be taken with a minimum of 350 loly.  Indication: Mood disorder symptoms  Increase quetiapine to 150-200 mg p.o. at bedtime, indication: Anxiety and sleep promotion  Continue nicotine 21 mg/24-hour patch, 1 patch transdermal daily for nicotine withdrawal symptoms  PRN nicotine gum 4 mg buccal every 1 hour as needed for nicotine withdrawal symptoms  PRN quetiapine 25-50 mg p.o. 3 times daily, first-line for anxiety symptoms, agitation, and sleep promotion  PRN olanzapine 10 mg p.o. 3 times daily as needed for agitation, order linked with backup olanzapine  10 mg IM injection  As needed trazodone 50 mg p.o. at bedtime as needed for sleep promotion, may repeat after 60 minutes  PRN hydroxyzine 25 mg p.o. over fours as needed for anxiety symptoms    3.  Labs/Imaging:  - None today    4. Consults:  -Hospitalist to be consulted as needed.    5. Precautions:  - Suicide precautions    6. Legal:  - Voluntary    7. Discharge planning:  -Per unit CTC        Risk Assessment: IP [facility] RISK ASSESSMENT: Patient able to contract for safety and Patient on precautions    Coordination of Care:   Treatment Plan reviewed and physician signed, Care discussed with Care/Treatment Team Members, Chart reviewed and Patient seen      Re-Certification I certify that the inpatient psychiatric facility services furnished since the previous certification were, and continue to be, medically necessary for, either, treatment which could reasonably be expected to improve the patient s condition or diagnostic study and that the hospital records indicate that the services furnished were, either, intensive treatment services, admission and related services necessary for diagnostic study, or equivalent services.     I certify that the patient continues to need, on a daily basis, active treatment furnished directly by or requiring the supervision of inpatient psychiatric facility personnel.     I estimate 7-10 days of hospitalization is necessary for proper treatment of the patient. My plans for post-hospital care for this patient are  TBD     JEEVAN Chu CNP    -    8/1/2025     -    12:30 PM      Total time  35 minutes with > 50%spent on coordination of cares and psycho-education.    This note was created with help of Dragon dictation system. Grammatical / typing errors are not intentional.    JEEVAN Chu CNP

## 2025-08-01 NOTE — PROGRESS NOTES
"Rehab Group    Start time: 1610  End time: 1655  Patient time total: 45 minutes    attended full group     #3 attended   Group Type: music   Group Topic Covered: coping skills, mindfulness, and relaxation      Group Session Detail: Mindfulness     Patient Response/Contribution:  attentive and actively engaged     Patient Detail: Cooperatively engaged in Evening Music Relaxation group to decrease anxiety and promote mood uplift  Positive affect, appropriately engaged in session, responding well to the music.    Pt was very enthusiastic about session and getting to her his preferred music.  He shared about what the songs meant to him and listened attentively to others.  \"You made my day\", he said to MT, referring to the chance to hear his preferred music.       Activity Therapy Per 15 min ()    Patient Active Problem List   Diagnosis    Other acne    Social phobia    Panic disorder without agoraphobia    Major depressive disorder, single episode, moderate (H)    Generalized anxiety disorder    Depression    Attention deficit hyperactivity disorder, predominantly inattentive type    Displacement of lumbar intervertebral disc without myelopathy    Anxiety    Suicidal ideation    Bipolar 1 disorder (H)       "

## 2025-08-01 NOTE — PLAN OF CARE
Rehab Group    Start time: 1315  End time: 1405  Patient time total: 50 minutes    attended full group    #5 attended   Group Type: occupational therapy, general health and coping, and self-care   Group Topic Covered: balanced lifestyle, coping skills, healthy leisure time, mindfulness, problem solving, relationship skills/support systems, self-care, and social skills     Group Session Detail:  Patient engaged in group leisure activity (Family Feud) with the focus being: building interpersonal skills, encouraging team collaboration, and promoting overall prosocial engagement and interaction.     Patient Response/Contribution:  positive affect, cooperative with task, organized, supportive of peers, socially appropriate, safe use of materials/supplies, listened actively, attentive, and actively engaged     Patient Detail:    Pt arrived to group on time. Pt remained fully engaged, attentive, and cooperative throughout group. Pt worked collaboratively with peers to reach a common goal. Pt offered appropriate and clever responses to the game prompts.      Pt appeared attentive to task with good tracking and engagement observed. Verbal contributions remained appropriate and coherent. Pt appeared comfortable working in a group dynamic and remained a respectful listener to peers. Pt observed to often smile and laugh with peers. Pt thanked writer before exiting.         [id number] OT Group (2 or more in attendance)      Patient Active Problem List   Diagnosis    Other acne    Social phobia    Panic disorder without agoraphobia    Major depressive disorder, single episode, moderate (H)    Generalized anxiety disorder    Depression    Attention deficit hyperactivity disorder, predominantly inattentive type    Displacement of lumbar intervertebral disc without myelopathy    Anxiety    Suicidal ideation    Bipolar 1 disorder (H)

## 2025-08-01 NOTE — PLAN OF CARE
Problem: Adult Behavioral Health Plan of Care  Goal: Adheres to Safety Considerations for Self and Others  Outcome: Progressing  Intervention: Develop and Maintain Individualized Safety Plan  Recent Flowsheet Documentation  Taken 8/1/2025 0211 by Karri Pantoja RN  Safety Measures: safety rounds completed     Problem: Suicide Risk  Goal: Absence of Self-Harm  Outcome: Progressing     Problem: Anxiety Signs/Symptoms  Goal: Improved Sleep (Anxiety Signs/Symptoms)  Intervention: Promote Healthy Sleep Hygiene  Recent Flowsheet Documentation  Taken 8/1/2025 0211 by Karri Pantoja RN  Sleep Hygiene Promotion: regular sleep pattern promoted   Goal Outcome Evaluation:       Patient was calm and was observed sleeping , no pain reported and no PRN administered,Breathing appeared normal during safety checks and patient did not seem to be in any distress.      Sleep Hours:6.75

## 2025-08-01 NOTE — PLAN OF CARE
-Attending Provider: JEEVAN Lin CNP  -Voicemail Code: [id number]  -Team Note Due: Tuesday  -Next Steps:    Follow up on IRTS referrals   Set up psychiatry         Assessment/Intervention/Current Symtoms and Care Coordination:  Chart review and met with team, discussed pt progress, symptomology, and response to treatment.  Discussed the discharge plan and any potential impediments to discharge.  CTC received confirmation email from [facility] confirming they received IRTS referral. [facility] requested that [facility] review the tobacco free policy with [facility] and confirm he is still interested in [facility]. [facility] followed up with [facility], confirmed he is still interested in [facility], and informed [facility] that [facility] is still interested in [facility].   Discharge Plan or Goal:  Pending further stabilization, continued compliance with medications, continued activities of daily living, and development of a safe discharge plan.     Considerations: set up psychiatry, IRTS, board and lodge     Barriers to Discharge:  Impact of mental health symptoms on well being   Impact of mental health symptoms on activities of daily living  Need for medication monitoring and medication management     Referral Status:  [facility] faxed 7/31 for 3 locations, confirmed received on 8/1  [facility] faxed 7/31 for 1 location     Legal Status:  Voluntary    County:   File Number:   Start and expiration date of commitment:     Contacts:

## 2025-08-01 NOTE — PLAN OF CARE
[facility] IP Unit Acuity Rating Score (UARS)  Patient is given one point for every criteria they meet.    CRITERIA SCORING   On a 72 hour hold, court hold, committed, stay of commitment, or revocation. 0    Patient LOS on [facility] unit exceeds 20 days. 0  LOS: 3   Patient under guardianship, 55+, otherwise medically complex, or under age 11. 0   Suicide ideation without relief of precipitating factors. 1   Current plan for suicide. 1   Current plan for homicide. 0   Imminent risk or actual attempt to seriously harm another without relief of factors precipitating the attempt. 0   Severe dysfunction in daily living (ex: complete neglect for self care, extreme disruption in vegetative function, extreme deterioration in social interactions). 0   Recent (last 7 days) or current physical aggression in the ED or on unit. 0   Restraints or seclusion episode in past 72 hours. 0   Recent (last 7 days) or current verbal aggression, agitation, yelling, etc., while in the ED or unit. 0   Active psychosis. 0   Need for constant or near constant redirection (from leaving, from others, etc).  0   Intrusive or disruptive behaviors. 0   Patient requires 3 or more hours of individualized nursing care per 8-hour shift (i.e. for ADLs, meds, therapeutic interventions). 0   TOTAL 2

## 2025-08-01 NOTE — PLAN OF CARE
"  Rehab Group    Start time: 1015  End time: 1115  Patient time total: 55 minutes    attended full group    #7 attended   Group Type: occupational therapy and OT Clinic   Group Topic Covered: balanced lifestyle, coping skills, healthy leisure time, problem solving, relationship skills/support systems, self-esteem, and social skills     Group Session Detail:  OT: Pt actively participated in occupational therapy clinic to facilitate coping skill exploration, creative expression within personally meaningful activities, and clinical observation of social, cognitive, and kinesthetic performance skills.     Patient Response/Contribution:  cooperative with task, supportive of peers, socially appropriate, safe use of materials/supplies, attentive, and actively engaged     Patient Detail:    Pt arrived to group on time. Pt presented as pleasant and with calm affect. Pt unable to decide what to complete during group despite encouragement. This writer provided pt with stain glass art painting. Pt engaged in this while initiating and engaging in social interactions with peers and staff.     Similar to past groups, pt often criticized his own work and stated \"I always make mistakes.\" Pt also required frequent prompts and cues to ask for assistance with paint. Pt requested songs to listen to throughout group. Pt able to independently terminate task at the end of group, and cleaned workspace before exiting.         [id number] OT Group (2 or more in attendance)      Patient Active Problem List   Diagnosis    Other acne    Social phobia    Panic disorder without agoraphobia    Major depressive disorder, single episode, moderate (H)    Generalized anxiety disorder    Depression    Attention deficit hyperactivity disorder, predominantly inattentive type    Displacement of lumbar intervertebral disc without myelopathy    Anxiety    Suicidal ideation    Bipolar 1 disorder (H)       "

## 2025-08-02 PROCEDURE — 124N000002 HC R&B MH UMMC

## 2025-08-02 PROCEDURE — 250N000013 HC RX MED GY IP 250 OP 250 PS 637: Performed by: PSYCHIATRY & NEUROLOGY

## 2025-08-02 PROCEDURE — 250N000013 HC RX MED GY IP 250 OP 250 PS 637

## 2025-08-02 PROCEDURE — H2032 ACTIVITY THERAPY, PER 15 MIN: HCPCS

## 2025-08-02 RX ADMIN — DEXTROAMPHETAMINE SULFATE, DEXTROAMPHETAMINE SACCHARATE, AMPHETAMINE SULFATE AND AMPHETAMINE ASPARTATE 20 MG: 5; 5; 5; 5 CAPSULE, EXTENDED RELEASE ORAL at 08:26

## 2025-08-02 RX ADMIN — OLANZAPINE 10 MG: 10 TABLET, FILM COATED ORAL at 18:31

## 2025-08-02 RX ADMIN — LURASIDONE HYDROCHLORIDE 40 MG: 40 TABLET, FILM COATED ORAL at 17:54

## 2025-08-02 RX ADMIN — NICOTINE 1 PATCH: 21 PATCH, EXTENDED RELEASE TRANSDERMAL at 08:27

## 2025-08-02 RX ADMIN — QUETIAPINE FUMARATE 25 MG: 25 TABLET ORAL at 11:06

## 2025-08-02 RX ADMIN — QUETIAPINE FUMARATE 50 MG: 50 TABLET ORAL at 14:16

## 2025-08-02 RX ADMIN — Medication 250 MG: at 08:27

## 2025-08-02 RX ADMIN — QUETIAPINE FUMARATE 150 MG: 100 TABLET ORAL at 20:40

## 2025-08-02 RX ADMIN — DEXTROAMPHETAMINE SACCHARATE, AMPHETAMINE ASPARTATE, DEXTROAMPHETAMINE SULFATE AND AMPHETAMINE SULFATE 5 MG: 1.25; 1.25; 1.25; 1.25 TABLET ORAL at 12:15

## 2025-08-02 RX ADMIN — DEXTROAMPHETAMINE SACCHARATE, AMPHETAMINE ASPARTATE, DEXTROAMPHETAMINE SULFATE AND AMPHETAMINE SULFATE 5 MG: 1.25; 1.25; 1.25; 1.25 TABLET ORAL at 08:26

## 2025-08-02 ASSESSMENT — ACTIVITIES OF DAILY LIVING (ADL)
ADLS_ACUITY_SCORE: 15
HYGIENE/GROOMING: INDEPENDENT
ADLS_ACUITY_SCORE: 15
DRESS: SCRUBS (BEHAVIORAL HEALTH)
ADLS_ACUITY_SCORE: 15
ORAL_HYGIENE: INDEPENDENT
ADLS_ACUITY_SCORE: 15

## 2025-08-02 NOTE — PROGRESS NOTES
Rehab Group    Start time: 1600  End time: 1700  Patient time total: 45 minutes    attended full group    #5 attended   Group Type: art   Group Topic Covered: activity therapy       Group Session Detail:  Art Therapy directive was to choose a personal intention for the day/weekend and create art in response. Pts were encouraged to use mindfulness skills while engaged in creative process.  Goals of directive: mindfulness, emotional expression, emotional regulation, media exploration.     Patient Response/Contribution:  cooperative with task       Patient Detail:    Pt was an engaged participant, focused on task for the full duration of group. Pt finished artwork and briefly shared with author and group. Pt chose the affirmations: change, growth, transformation. Pts artwork was expressive and hopeful.  Pts mood was calm, pleasant participant.       Activity Therapy Per 15 min ()      Patient Active Problem List   Diagnosis    Other acne    Social phobia    Panic disorder without agoraphobia    Major depressive disorder, single episode, moderate (H)    Generalized anxiety disorder    Depression    Attention deficit hyperactivity disorder, predominantly inattentive type    Displacement of lumbar intervertebral disc without myelopathy    Anxiety    Suicidal ideation    Bipolar 1 disorder (H)

## 2025-08-02 NOTE — PLAN OF CARE
Problem: Anxiety Signs/Symptoms  Goal: Optimized Energy Level (Anxiety Signs/Symptoms)  Outcome: Ongoing     Problem: Suicide Risk  Goal: Absence of Self-Harm  Outcome: Progressing   Goal Outcome Evaluation:    Plan of Care Reviewed With: patient      Fuentes has been visible in the lounge watching TV and also attending the group activities held today. His mood was calm and affect flat but bightens when conversing with him. He deined SI, HI, and AVHs but endorsed anxiety and depression. He took his routine meds and prn Seroquel total 75 mg for anxiety. He did not interact with his peers despite being among them.

## 2025-08-02 NOTE — PLAN OF CARE
Problem: Adult Behavioral Health Plan of Care  Goal: Adheres to Safety Considerations for Self and Others  Outcome: Progressing  Intervention: Develop and Maintain Individualized Safety Plan  Recent Flowsheet Documentation  Taken 8/2/2025 0321 by Karri Pantoja RN  Safety Measures: safety rounds completed     Problem: Suicide Risk  Goal: Absence of Self-Harm  Outcome: Progressing     Problem: Anxiety Signs/Symptoms  Goal: Improved Sleep (Anxiety Signs/Symptoms)  Intervention: Promote Healthy Sleep Hygiene  Recent Flowsheet Documentation  Taken 8/2/2025 0321 by Karri Pantoja RN  Sleep Hygiene Promotion: regular sleep pattern promoted   Goal Outcome Evaluation:       Patient is calm and sleeping.No pain reported. Breathing appeared normal during safety checks. No other concerns reported.      Sleep Hours:7

## 2025-08-02 NOTE — PLAN OF CARE
Patient had a good shift. He spent majority of the pm shift out in the lounge, watched television with peers. Patient presented with a bright affect. Patient denied SI/SIB/hallucination. Patient endorsed depression of a 6/10 and anxiety of 7/10/ Patient was given prn Seroquel 50 mg for anxiety and was effective. Patient denied pain,  reported the medication he got on day shift for migraine headache really helped. Appetite is improving. No issues with bowel and bladder. VSS.

## 2025-08-03 PROCEDURE — 250N000013 HC RX MED GY IP 250 OP 250 PS 637

## 2025-08-03 PROCEDURE — 124N000002 HC R&B MH UMMC

## 2025-08-03 PROCEDURE — 250N000013 HC RX MED GY IP 250 OP 250 PS 637: Performed by: PSYCHIATRY & NEUROLOGY

## 2025-08-03 PROCEDURE — 90853 GROUP PSYCHOTHERAPY: CPT | Performed by: COUNSELOR

## 2025-08-03 RX ADMIN — LURASIDONE HYDROCHLORIDE 40 MG: 40 TABLET, FILM COATED ORAL at 17:47

## 2025-08-03 RX ADMIN — QUETIAPINE FUMARATE 25 MG: 25 TABLET ORAL at 17:13

## 2025-08-03 RX ADMIN — DEXTROAMPHETAMINE SACCHARATE, AMPHETAMINE ASPARTATE, DEXTROAMPHETAMINE SULFATE AND AMPHETAMINE SULFATE 5 MG: 1.25; 1.25; 1.25; 1.25 TABLET ORAL at 12:26

## 2025-08-03 RX ADMIN — DEXTROAMPHETAMINE SULFATE, DEXTROAMPHETAMINE SACCHARATE, AMPHETAMINE SULFATE AND AMPHETAMINE ASPARTATE 20 MG: 5; 5; 5; 5 CAPSULE, EXTENDED RELEASE ORAL at 09:27

## 2025-08-03 RX ADMIN — QUETIAPINE FUMARATE 150 MG: 100 TABLET ORAL at 20:08

## 2025-08-03 RX ADMIN — NICOTINE 1 PATCH: 21 PATCH, EXTENDED RELEASE TRANSDERMAL at 09:28

## 2025-08-03 RX ADMIN — Medication 250 MG: at 09:27

## 2025-08-03 RX ADMIN — DEXTROAMPHETAMINE SACCHARATE, AMPHETAMINE ASPARTATE, DEXTROAMPHETAMINE SULFATE AND AMPHETAMINE SULFATE 5 MG: 1.25; 1.25; 1.25; 1.25 TABLET ORAL at 09:27

## 2025-08-03 ASSESSMENT — ACTIVITIES OF DAILY LIVING (ADL)
ADLS_ACUITY_SCORE: 15
DRESS: SCRUBS (BEHAVIORAL HEALTH)
ADLS_ACUITY_SCORE: 15
ORAL_HYGIENE: INDEPENDENT
ORAL_HYGIENE: INDEPENDENT
ADLS_ACUITY_SCORE: 15
ADLS_ACUITY_SCORE: 15
HYGIENE/GROOMING: INDEPENDENT
HYGIENE/GROOMING: INDEPENDENT
DRESS: SCRUBS (BEHAVIORAL HEALTH);INDEPENDENT
ADLS_ACUITY_SCORE: 15

## 2025-08-03 NOTE — PLAN OF CARE
Problem: Adult Behavioral Health Plan of Care  Goal: Adheres to Safety Considerations for Self and Others  Outcome: Progressing  Intervention: Develop and Maintain Individualized Safety Plan  Recent Flowsheet Documentation  Taken 8/3/2025 0412 by Karri Pantoja RN  Safety Measures: safety rounds completed     Problem: Suicide Risk  Goal: Absence of Self-Harm  Outcome: Progressing     Problem: Anxiety Signs/Symptoms  Goal: Improved Sleep (Anxiety Signs/Symptoms)  Intervention: Promote Healthy Sleep Hygiene  Recent Flowsheet Documentation  Taken 8/3/2025 0412 by Karri Pantoja RN  Sleep Hygiene Promotion: regular sleep pattern promoted   Goal Outcome Evaluation:       Patient was calm and was observed sleeping. No pain reported and no medication administered. Breathing appeared normal, no distress reported.    Sleep Hours:7

## 2025-08-03 NOTE — PLAN OF CARE
Problem: Adult Behavioral Health Plan of Care  Goal: Optimized Coping Skills in Response to Life Stressors  Outcome: Progressing    Pt presented as alert and oriented to place and self throughout shift.  They were intermittently visible in the milieu during the day.  They were dressed appropriately and appeared adequately hygenic.  Pt is eating and drinking adequately.  They were compliant with their scheduled medications.  Pt did not request or require any PRNs on this shift.  VSS and no issues with bowel or bladder.  Pt endorsed moderate anxiety and depression.  They did not endorse any symptoms of psychosis.  Pt denied pain or any acute physical health concerns.  No side effects to medications noted this shift.    Goal Outcome Evaluation:    Plan of Care Reviewed With: patient

## 2025-08-03 NOTE — PROGRESS NOTES
"Patient reported to the line of sight staff that she is hearing evil voices telling her to rash out and hurt people. Patient has no intention of doing that. Patient was also teary and stated that she is worried about her housing. Patient also stated, \"I don't know how much time I have lost to delusions.Patient said that she will come out of her room, eat snack, and go back to sleep. Patient ate at [employer] dinner, and has been eating crackers and drinking ice water this evening.  "

## 2025-08-03 NOTE — PLAN OF CARE
"Goal Outcome Evaluation:    Plan of Care Reviewed With: patient    Patient was observed sitting in the lounge watching TV with select peers. Patient presented with sad affect but brightened upon approach. Patient reported \"feeling gloggy, and more irritable since he started Latuda. Patient was given drug information per request. Prn Zyprexa was given for irritability with some effectiveness. Patient reports moderate anxiety and depression. Patient denies intent to hurt self or peers. Patient denies self-injurious behaviors, auditory, and visual hallucinations. Contracts for safety. Patient denies bowel and bladder issues. Patient is eating and drinking adequately. Patient was med compliant. Vitals were within normal limit.                   "

## 2025-08-04 PROCEDURE — 97150 GROUP THERAPEUTIC PROCEDURES: CPT | Mod: GO

## 2025-08-04 PROCEDURE — 250N000013 HC RX MED GY IP 250 OP 250 PS 637: Performed by: PSYCHIATRY & NEUROLOGY

## 2025-08-04 PROCEDURE — 250N000013 HC RX MED GY IP 250 OP 250 PS 637

## 2025-08-04 PROCEDURE — H2032 ACTIVITY THERAPY, PER 15 MIN: HCPCS

## 2025-08-04 PROCEDURE — 90832 PSYTX W PT 30 MINUTES: CPT

## 2025-08-04 PROCEDURE — 124N000002 HC R&B MH UMMC

## 2025-08-04 PROCEDURE — 99232 SBSQ HOSP IP/OBS MODERATE 35: CPT

## 2025-08-04 RX ADMIN — QUETIAPINE FUMARATE 150 MG: 100 TABLET ORAL at 20:04

## 2025-08-04 RX ADMIN — NICOTINE 1 PATCH: 21 PATCH, EXTENDED RELEASE TRANSDERMAL at 08:09

## 2025-08-04 RX ADMIN — DEXTROAMPHETAMINE SACCHARATE, AMPHETAMINE ASPARTATE, DEXTROAMPHETAMINE SULFATE AND AMPHETAMINE SULFATE 5 MG: 1.25; 1.25; 1.25; 1.25 TABLET ORAL at 12:47

## 2025-08-04 RX ADMIN — DEXTROAMPHETAMINE SULFATE, DEXTROAMPHETAMINE SACCHARATE, AMPHETAMINE SULFATE AND AMPHETAMINE ASPARTATE 20 MG: 5; 5; 5; 5 CAPSULE, EXTENDED RELEASE ORAL at 08:07

## 2025-08-04 RX ADMIN — LURASIDONE HYDROCHLORIDE 60 MG: 40 TABLET, FILM COATED ORAL at 17:47

## 2025-08-04 RX ADMIN — Medication 250 MG: at 08:07

## 2025-08-04 RX ADMIN — DEXTROAMPHETAMINE SACCHARATE, AMPHETAMINE ASPARTATE, DEXTROAMPHETAMINE SULFATE AND AMPHETAMINE SULFATE 5 MG: 1.25; 1.25; 1.25; 1.25 TABLET ORAL at 08:07

## 2025-08-04 RX ADMIN — QUETIAPINE FUMARATE 25 MG: 25 TABLET ORAL at 17:08

## 2025-08-04 ASSESSMENT — ACTIVITIES OF DAILY LIVING (ADL)
ADLS_ACUITY_SCORE: 15
HYGIENE/GROOMING: INDEPENDENT
ADLS_ACUITY_SCORE: 15
ADLS_ACUITY_SCORE: 15
ADLS_ACUITY_SCORE: 20
ADLS_ACUITY_SCORE: 15
DRESS: INDEPENDENT
ORAL_HYGIENE: INDEPENDENT
ADLS_ACUITY_SCORE: 15
ORAL_HYGIENE: INDEPENDENT
ADLS_ACUITY_SCORE: 15
DRESS: SCRUBS (BEHAVIORAL HEALTH);INDEPENDENT
ADLS_ACUITY_SCORE: 15
ADLS_ACUITY_SCORE: 20
HYGIENE/GROOMING: INDEPENDENT
ADLS_ACUITY_SCORE: 15
ADLS_ACUITY_SCORE: 15

## 2025-08-04 NOTE — PLAN OF CARE
-Attending Provider: JEEVAN Lin CNP  -Voicemail Code: [id number]  -Team Note Due: Tuesday  -Next Steps:    Follow up on [facility] referrals   Set up psychiatry         Assessment/Intervention/Current Symtoms and Care Coordination:  Chart review and met with team, discussed pt progress, symptomology, and response to treatment.  Discussed the discharge plan and any potential impediments to discharge.  [facility] did not perform any specific interventions on this day.   Discharge Plan or Goal:  Pending further stabilization, continued compliance with medications, continued activities of daily living, and development of a safe discharge plan.     Considerations: set up psychiatry, [facility], board and lodge     Barriers to Discharge:  Impact of mental health symptoms on well being   Impact of mental health symptoms on activities of daily living  Need for medication monitoring and medication management     Referral Status:  [facility] faxed 7/31 for 3 locations, confirmed received on 8/1  [facility] faxed 7/31 for 1 location     Legal Status:  Voluntary    County:   File Number:   Start and expiration date of commitment:     Contacts:

## 2025-08-04 NOTE — PROGRESS NOTES
"PSYCHIATRY  PROGRESS NOTE     DATE OF SERVICE   8/4/2025       CHIEF COMPLAINT   \"Okay.\"       SUBJECTIVE   Per nursing documentation:    Patient was visible in the milieu. Patient attended therapy group. Patient watched TV with select peers. Patient reported feeling \"less groggy\" from Latuda this evening. Patient denies pain. Patient endorses anxiety, and depression rated 4/10.Prn Seroquel was given per patient request with some effectiveness. Patient denies self-injurious behaviors, suicidal, and homicidal ideations. Contracts for safety. Patient was med compliant. Patient refused to remove nicotine patch. \"I want to leave it on\", patient stated.Vitals were within normal limit.     Patient is documented to sleep 7 hours overnight.    Per unit CTC documentation:    Unit CTC to facilitate aftercare plan.  Referrals for IRTS programs have been made and plan will be to set up outpatient psychiatry appointments.         OBJECTIVE   Met with patient in hospital room of unit [facility]. Upon patient interview, patient reports their mood as, \" okay.\"  Patient's affect appears blunted.  Patient does not endorse anxiety symptoms today and reports he has not needed to utilize any PRN quetiapine.  Patient reports depression symptoms rated 6.5-7/10 (0=none, 10=severe).  Patient does acknowledge that depression symptoms have been slightly improving with initiation of Latuda.  Patient does report experiencing some grogginess and mild irritability with dose increases of Latuda however reported this has resolved.  Patient ongoing intermittent thoughts of passive SI which occur immediately when he wakes up and intermittently occurs throughout the day.  Patient denies any active thoughts of SI, SIB, SA, intent or plan. Denies HI. Patient able to contract for safety.  Writer discussed option of increasing Latuda to 60 mg p.o. daily with supper mg p.o. daily with supper to further target mood disorder symptoms.  Patient in agreement with " this plan.  Patient does report that over the weekend he almost refused Latuda due to side effects with dose increases however with further education from nursing staff regarding process of completing an adequate psychiatric medication trial he subsequently took the medication.  Patient acknowledges that he does have a history of stopping medications prior to completing an adequate med trial due to frustration when not immediately effective for adequately treating psychiatric symptoms. Patient denies any AH or VH. Patient denies any paranoid thoughts or delusions. Patient endorses sleep has been improving with increase of scheduled quetiapine at bedtime; patient does endorse that he continues to experience multiple nighttime awakenings which is baseline for him.  Writer discussed that plan will be to continue Seroquel 150-200 mg p.o. at bedtime to further target sleep promotion, and patient in agreement with this.  Patient reports appetite is stable however at baseline they experience low appetite.  Denies any issue with bowel or bladder. Patient vital signs reviewed and noted to be stable. Patient denies any acute medical concerns today. Patient has no other questions or concerns.        MEDICATIONS   Medications:  Scheduled Meds:  Current Facility-Administered Medications   Medication Dose Route Frequency Provider Last Rate Last Admin    amphetamine-dextroamphetamine (ADDERALL XR) ER cap 20 mg  20 mg Oral Daily Yanet Moreira APRN CNP   20 mg at 08/04/25 0807    amphetamine-dextroamphetamine (ADDERALL) per tablet 5 mg  5 mg Oral BID Yanet Moreira APRN CNP   5 mg at 08/04/25 0807    ascorbic acid (vitamin C) chewable tablet 250 mg  250 mg Oral Daily Rosy Gifford MD   250 mg at 08/04/25 0807    lurasidone (LATUDA) tablet 40 mg  40 mg Oral Daily with supper Yanet Moreira APRN CNP   40 mg at 08/03/25 1747    nicotine (NICODERM CQ) 21 MG/24HR 24 hr patch 1 patch  1 patch Transdermal Daily  Yanet Moreira APRN CNP   1 patch at 08/04/25 0809    nicotine patch REMOVAL   Transdermal At Bedtime Yanet Moreira APRN CNP        QUEtiapine (SEROquel) tablet 150 mg  150 mg Oral At Bedtime Yanet Moreira APRN CNP   150 mg at 08/03/25 2008    Or    QUEtiapine (SEROquel) tablet 200 mg  200 mg Oral At Bedtime Yanet Moreira APRN CNP         Continuous Infusions:  Current Facility-Administered Medications   Medication Dose Route Frequency Provider Last Rate Last Admin     PRN Meds:.  Current Facility-Administered Medications   Medication Dose Route Frequency Provider Last Rate Last Admin    acetaminophen (TYLENOL) tablet 650 mg  650 mg Oral Q4H PRN Washington Villeda MD   650 mg at 08/01/25 1218    alum & mag hydroxide-simethicone (MAALOX) suspension 30 mL  30 mL Oral Q4H PRN Washington Villeda MD        aspirin-acetaminophen-caffeine (EXCEDRIN MIGRAINE) per tablet 1 tablet  1 tablet Oral BID PRN Yanet Moreira APRN CNP   1 tablet at 08/01/25 1429    hydrOXYzine HCl (ATARAX) tablet 25 mg  25 mg Oral Q4H PRN Yanet Moreira APRN CNP   25 mg at 08/01/25 1432    nicotine polacrilex (NICORETTE) gum 4 mg  4 mg Buccal Q1H PRN Yanet Moreira APRN CNP        OLANZapine (zyPREXA) tablet 10 mg  10 mg Oral TID PRN Washington Villeda MD   10 mg at 08/02/25 1831    Or    OLANZapine (zyPREXA) injection 10 mg  10 mg Intramuscular TID PRN Washington Villeda MD        polyethylene glycol (MIRALAX) Packet 17 g  17 g Oral Daily PRN Washington Villeda MD        QUEtiapine (SEROquel) tablet 25 mg  25 mg Oral TID PRN Rosy Gifford MD   25 mg at 08/03/25 1713    Or    QUEtiapine (SEROquel) tablet 50 mg  50 mg Oral TID PRN Rosy Gifford MD   50 mg at 08/02/25 1416    traZODone (DESYREL) tablet 50 mg  50 mg Oral At Bedtime PRN Washington Villeda MD   50 mg at 07/29/25 4293       Medication adherence issues: MS Med Adherence Y/N: Yes, Patient  "previously reports a history of noncompliance with psychiatric medications in the community  Medication side effects: MEDICATION SIDE EFFECTS: no side effects reported  Benefit: Yes / No: Yes       ROS   Pertinent items are noted in HPI.       MENTAL STATUS EXAM   Vitals: /88   Pulse 83   Temp 98  F (36.7  C) (Oral)   Resp 16   Ht 1.829 m (6')   Wt 84.5 kg (186 lb 4.8 oz)   SpO2 99%   BMI 25.27 kg/m      Appearance:  Casually groomed  Mood: Reports, \"okay.\"    Affect: blunted was congruent to speech  Suicidal Ideation: Passive SI with no plan or intent  Homicidal Ideation: PRESENT / ABSENT: absent   Thought process: Goal oriented and logical  Thought content: Passive SI with no plan or intent, able to contract for safety.  Denies HI  Fund of Knowledge: Average  Attention/Concentration: Fair  Language ability:  Intact  Memory: Appears intact, not formally assessed  Insight:  fair.  Judgement: fair  Orientation: Yes, x4  Psychomotor Behavior: normal or unremarkable    Muscle Strength and Tone: MuscleStrength: Normal  Gait and Station: Normal       LABS   personally reviewed.     EKG completed on 7/30/2025 and results: Sinus rhythm, normal EKG, QTc: 431     Writer reviewed admission labs.  Patient noted to have slightly elevated anion gap: 17, lipids elevated including cholesterol: 207 plan will be to continue to monitor lab values and treat as clinically indicated.     Latest Reference Range & Units 07/29/25 14:40 07/30/25 07:31 07/30/25 13:33   Sodium 135 - 145 mmol/L  141    Potassium 3.4 - 5.3 mmol/L  4.3    Chloride 98 - 107 mmol/L  103    Carbon Dioxide (CO2) 22 - 29 mmol/L  21 (L)    Urea Nitrogen 6.0 - 20.0 mg/dL  14.2    Creatinine 0.67 - 1.17 mg/dL  0.86    GFR Estimate >60 mL/min/1.73m2  >90    Calcium 8.8 - 10.4 mg/dL  9.2    Anion Gap 7 - 15 mmol/L  17 (H)    Albumin 3.5 - 5.2 g/dL  4.3    Protein Total 6.4 - 8.3 g/dL  6.8    Alkaline Phosphatase 40 - 150 U/L  62    ALT 0 - 70 U/L  53    AST 0 " "- 45 U/L  43    Bilirubin Total <=1.2 mg/dL  0.5    Cholesterol <200 mg/dL  207 (H)    Glucose 70 - 99 mg/dL  112 (H)    HDL Cholesterol >=40 mg/dL  49    Estimated Average Glucose <117 mg/dL  108    Hemoglobin A1C <5.7 %  5.4    LDL Cholesterol Calculated <100 mg/dL  134 (H)    Non HDL Cholesterol <130 mg/dL  158 (H)    Triglycerides <150 mg/dL  122    Vitamin B12 232 - 1,245 pg/mL  365    Vitamin D, Total (25-Hydroxy) 20 - 50 ng/mL  22    WBC 4.0 - 11.0 10e3/uL  7.2    Hemoglobin 13.3 - 17.7 g/dL  14.4    Hematocrit 40.0 - 53.0 %  43.3    Platelet Count 150 - 450 10e3/uL  227    RBC Count 4.40 - 5.90 10e6/uL  4.83    MCV 78 - 100 fL  90    MCH 26.5 - 33.0 pg  29.8    MCHC 31.5 - 36.5 g/dL  33.3    RDW 10.0 - 15.0 %  12.9    Amphetamine Qual Urine Screen Negative  Screen Negative     Fentanyl Qual Urine Screen Negative  Screen Negative     Cocaine Urine Screen Negative  Screen Negative     Benzodiazepine Urine Screen Negative  Screen Negative     Opiates Qualitative Urine Screen Negative  Screen Negative     PCP Urine Screen Negative  Screen Negative     Cannabinoids Qual Urine Screen Negative  Screen Negative     Barbiturates Qual Urine Screen Negative  Screen Negative     EKG 12-lead, tracing only    Rpt (P)   Diastolic Blood Pressure mmHg   See Comment   Systolic Blood Pressure mmHg   See Comment   (L): Data is abnormally low  (H): Data is abnormally high  (P): Preliminary  Rpt: View report in Results Review for more information    No results found for: \"PHENYTOIN\", \"PHENOBARB\", \"VALPROATE\", \"CBMZ\"       DIAGNOSIS   Principal Problem:    Bipolar 1 disorder (H), currently depressed  Suicide attempt  Anxiety disorder, unspecified  ADHD, predominantly inattentive type  History of alcohol use disorder  History of MDD  History of opiate use disorder, in remission  Cannabis use    Clinically Significant Risk Factors                                # Overweight: Estimated body mass index is 25.27 kg/m  as calculated " "from the following:    Height as of this encounter: 1.829 m (6').    Weight as of this encounter: 84.5 kg (186 lb 4.8 oz).          # Financial/Environmental Concerns: other (see comments) (no longer working, recently lost apartment)                Active Problem List:  Patient Active Problem List   Diagnosis    Other acne    Social phobia    Panic disorder without agoraphobia    Major depressive disorder, single episode, moderate (H)    Generalized anxiety disorder    Depression    Attention deficit hyperactivity disorder, predominantly inattentive type    Displacement of lumbar intervertebral disc without myelopathy    Anxiety    Suicidal ideation    Bipolar 1 disorder (H)          HOSPITAL COURSE AND ASSESSMENT   This is a 40 year old male with history of depression, anxiety Disorder, ADHD, Bipolar Disorder, Substance Use Disorder, YANA, panic, persistent depressive disorder and social phobia who presented to [facility] ED on 7/29/2025 reporting SI.  In the ED, patient reported that he put a bag over his head yesterday as a suicide attempt by suffocation.  Urine drug screening completed in the ED and negative for all substances. Patient was evaluated by provider in the ED and determined to be medically stable.  Patient subsequently admitted voluntarily to inpatient psychiatric unit 10N with attending Dr. Gifford for further psychiatric stabilization.  Upon admission, patient placed on status 15 monitoring.  Patient also placed on precautions: Suicide precautions.     At time of admission, patient reports noncompliance to psychiatric medications for the past 6 weeks.  Patient reports he did not take PTA medications due to, \"they were left in my apartment and now are in storage.\"  Patient reports that he stopped Abilify due to it being ineffective for mood disorder symptoms.  Patient also reports he trialed Prozac however did not find this medication helpful.  Patient reports that PTA Adderall has been effective for " management of ADHD symptoms and that this medication helps significantly with concentration.  Patient reports he was taking Adderall XR 30 mg daily along with Adderall 10 mg instant release 2 times daily however that he has not taken this in approximately 6 weeks.  Subsequently, writer discussed option of decreasing Adderall XR to 20 mg p.o. daily and Adderall IR to 5 mg twice daily to target ADHD symptom management.  Patient in agreement with this.  Patient also agreeable to continue trial of Latuda which was started in the ED.  Patient also reports quetiapine to be helpful for sleep and anxiety symptoms.  Due to this, writer discussed option of initiating quetiapine 25-50 mg p.o. 3 times daily as needed for irritation, anxiety, and sleep which patient is in agreement with.  Patient reports engaging in nicotine use described as vaping all day.  Patient agreeable to utilizing nicotine patch and nicotine gum for nicotine withdrawal symptoms.  Patient also agreeable to an EKG to monitor heart rhythm.  Patient reports notably poor p.o. intake prior to present to the hospital however that he is also concerned about weight gain; due to this writer placed dietitian consult to address poor p.o. intake and options for healthy snacks and writing menu options.       PLAN   1. Ongoing education given regarding diagnostic and treatment options with risks, benefits and alternatives and adequate verbalization of understanding.  2.  Medications:  Continue Adderall XR 20 mg p.o. daily to target ADHD symptoms; dose decrease due to patient reporting noncompliance to higher dosing prior to admission the hospital.  Continue Adderall IR 5 mg p.o. 2 times daily as patient reports noncompliance to medication prior to admitting to the hospital  Increase Latuda to 60 mg p.o. daily with supper, must be taken with a minimum of 350 loly.  Indication: Mood disorder symptoms  Continue quetiapine 150-200 mg p.o. at bedtime, indication: Anxiety and  sleep promotion  Continue nicotine 21 mg/24-hour patch, 1 patch transdermal daily for nicotine withdrawal symptoms  PRN nicotine gum 4 mg buccal every 1 hour as needed for nicotine withdrawal symptoms  PRN quetiapine 25-50 mg p.o. 3 times daily, first-line for anxiety symptoms, agitation, and sleep promotion  PRN olanzapine 10 mg p.o. 3 times daily as needed for agitation, order linked with backup olanzapine 10 mg IM injection  PRN trazodone 50 mg p.o. at bedtime as needed for sleep promotion, may repeat after 60 minutes  PRN hydroxyzine 25 mg p.o. over fours as needed for anxiety symptoms    3.  Labs/Imaging:  - None today    4. Consults:  -Hospitalist to be consulted as needed.    5. Precautions:  - Suicide precautions    6. Legal:  - Voluntary    7. Discharge planning:  -Per unit CTC        Risk Assessment:  [facility] RISK ASSESSMENT: Patient able to contract for safety and Patient on precautions    Coordination of Care:   Treatment Plan reviewed and physician signed, Care discussed with Care/Treatment Team Members, Chart reviewed and Patient seen      Re-Certification I certify that the inpatient psychiatric facility services furnished since the previous certification were, and continue to be, medically necessary for, either, treatment which could reasonably be expected to improve the patient s condition or diagnostic study and that the hospital records indicate that the services furnished were, either, intensive treatment services, admission and related services necessary for diagnostic study, or equivalent services.     I certify that the patient continues to need, on a daily basis, active treatment furnished directly by or requiring the supervision of inpatient psychiatric facility personnel.     I estimate 7-10 days of hospitalization is necessary for proper treatment of the patient. My plans for post-hospital care for this patient are  AURA Moreira, JEEVAN CNP    -    8/4/2025     -    2:29  PM    Total time  35 minutes with > 50%spent on coordination of cares and psycho-education.    This note was created with help of Dragon dictation system. Grammatical / typing errors are not intentional.    JEEVAN Chu CNP

## 2025-08-04 NOTE — PLAN OF CARE
Problem: Adult Behavioral Health Plan of Care  Goal: Optimized Coping Skills in Response to Life Stressors     Problem: Anxiety Signs/Symptoms  Goal: Optimized Energy Level (Anxiety Signs/Symptoms)     Goal Outcome Evaluation:       Patient presents as alert and oriented throughout the shift. Patient was visible in the milieu, attended group session, was sociable with staff and select peer. V.S are stable. Patient reported anxiety and depression 7/10. Prn Seroquel 25mg was administered. Patient denied pain, SI/SIB/HI, A/V hallucination. Patient reports good appetite and adequate oral intake, no bowel/bladder concern was reported. Patient was medication compliant. No reported concern this shift.

## 2025-08-04 NOTE — PLAN OF CARE
Problem: Adult Behavioral Health Plan of Care  Goal: Optimized Coping Skills in Response to Life Stressors  Outcome: Progressing    Pt presented as alert and oriented to place and self throughout shift.  They were primarily visible in the milieu during the day.  Pt did attend OT groups when available.   They were dressed appropriately and appeared adequately hygenic.  Pt is eating and drinking adequately.  They were compliant with their scheduled medications.  Pt did not request or require any PRNs on this shift.  VSS and no issues with bowel or bladder.  Pt endorsed moderate anxiety and mild depression.  They did not endorse any symptoms of psychosis.  Pt denied pain or any acute physical health concerns.  No side effects to medications noted this shift.    Goal Outcome Evaluation:    Plan of Care Reviewed With: patient

## 2025-08-04 NOTE — PLAN OF CARE
"Goal Outcome Evaluation:    Plan of Care Reviewed With: patient    Patient was visible in the milieu. Patient attended therapy group. Patient watched TV with select peers. Patient reported feeling \"less groggy\" from Latuda this evening. Patient denies pain. Patient endorses anxiety, and depression rated 4/10.Prn Seroquel was given per patient request with some effectiveness. Patient denies self-injurious behaviors, suicidal, and homicidal ideations. Contracts for safety. Patient was med compliant. Patient refused to remove nicotine patch. \"I want to leave it on\", patient stated.Vitals were within normal limit.                   "

## 2025-08-04 NOTE — PLAN OF CARE
Problem: Adult Behavioral Health Plan of Care  Goal: Adheres to Safety Considerations for Self and Others  Outcome: Progressing     Problem: Suicide Risk  Goal: Absence of Self-Harm  Outcome: Progressing     Problem: Anxiety Signs/Symptoms  Goal: Improved Sleep (Anxiety Signs/Symptoms)  Intervention: Promote Healthy Sleep Hygiene  Recent Flowsheet Documentation  Taken 8/4/2025 0308 by Karri Pantoja RN  Sleep Hygiene Promotion: regular sleep pattern promoted   Goal Outcome Evaluation:       Patient was calm, and was seen sleeping during safety checks,no pain reported. Breathing appeared normal and no concerns were observed.      Sleep Hours:7

## 2025-08-04 NOTE — PLAN OF CARE
"  Rehab Group    Start time: 1115  End time: 1200  Patient time total: 40 minutes    attended full group    #5 attended   Group Type: occupational therapy and general health and coping   Group Topic Covered: balanced lifestyle, coping skills, mindfulness, problem solving, relationship skills/support systems, self-esteem, and social skills     Group Session Detail:  Pt actively participated in a structured occupational therapy group (Combating Self-Criticism) with a focus on development of coping skills, increasing self-awareness, enhancing self-esteem, identification of strengths, and improving overall health and wellbeing. Education was provided and discussion was completed regarding the benefits of developing a growth mindset and gaining awareness of strengths through a \"personal strengths inventory\" and various strength-based exercises.      Patient Response/Contribution:  cooperative with task, organized, expressed readiness to alter behaviors, supportive of peers, socially appropriate, safe use of materials/supplies, listened actively, expressed understanding of topic, attentive, and actively engaged     Patient Detail:    Pt arrived to group a few minutes late after taking a break between groups. Pt was an active participate in discussion throughout group, stated that he \"has difficulty with self-compassion.\"     Pt identified a strength he holds as \"kindness\" and expressed that it is \"the simplest strength to complete.\" Pt actively completed strength-based exercises and worksheet, and demonstrated ability to focus and concentrate throughout this. Pt stated that he has \"difficulty identifying his strengths\" and endorsed often focus on negatively. Pt did laugh and make jokes throughout this process,but did remain appropriate. Pt thanked writer before exiting on this date.         [id number] OT Group (2 or more in attendance)      Patient Active Problem List   Diagnosis    Other acne    Social phobia    Panic " disorder without agoraphobia    Major depressive disorder, single episode, moderate (H)    Generalized anxiety disorder    Depression    Attention deficit hyperactivity disorder, predominantly inattentive type    Displacement of lumbar intervertebral disc without myelopathy    Anxiety    Suicidal ideation    Bipolar 1 disorder (H)

## 2025-08-04 NOTE — PLAN OF CARE

## 2025-08-04 NOTE — PLAN OF CARE
Goal Outcome Evaluation:      Group Attendance:  attended full group    Time session began: 4:20 pm  Time session ended: 5:10 pm  Patient's total time in group: 50    Total # Attendees   6   Group Type psychotherapeutic and DBT     Group Topic Covered DBT skills: DBT house ACT leaves on a stream, somatic, mind body clearing     Group Session Detail Process/ writing     Patient's response to the group topic/interactions:  cooperative with task, supportive of peers, socially appropriate, listened actively, attentive, and actively engaged     Patient Details: Mood alright, stuck in my head. Taught a few grounding skills and ACT meditation for releasing worries based on their needs.           [id number] - Group psychotherapy - 1 Session  Patient Active Problem List   Diagnosis    Other acne    Social phobia    Panic disorder without agoraphobia    Major depressive disorder, single episode, moderate (H)    Generalized anxiety disorder    Depression    Attention deficit hyperactivity disorder, predominantly inattentive type    Displacement of lumbar intervertebral disc without myelopathy    Anxiety    Suicidal ideation    Bipolar 1 disorder (H)

## 2025-08-04 NOTE — PLAN OF CARE
"  Rehab Group    Start time: 1015  End time: 1115  Patient time total: 55 minutes    attended full group    #6 attended   Group Type: occupational therapy and OT Clinic   Group Topic Covered: balanced lifestyle, coping skills, healthy leisure time, problem solving, relationship skills/support systems, self-esteem, and social skills     Group Session Detail:  OT: Pt actively participated in occupational therapy clinic to facilitate coping skill exploration, creative expression within personally meaningful activities, and clinical observation of social, cognitive, and kinesthetic performance skills.     Patient Response/Contribution:  cooperative with task, organized, supportive of peers, socially appropriate, safe use of materials/supplies, attentive, and actively engaged     Patient Detail:    Pt arrived to group on time. Pt presented as pleasant with calm affect. Pt often made jokes to others, observed to smile or laugh frequently. Pt requested songs throughout group to listen to. 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 Pt able to advocate for needs appropriately throughout group, and asked for assistance as needed. Pt observed to self-criticize his work, stating \"I always mess up\" and often noted to make jokes that were targeted at himself negatively.     Pt initiated and engaged in social interactions with peers and staff, remained socially appropriate throughout. Pt able to independently clean materials, tools, and workspace at the end of group.         [id number] OT Group (2 or more in attendance)      Patient Active Problem List   Diagnosis    Other acne    Social phobia    Panic disorder without agoraphobia    Major depressive disorder, single episode, moderate (H)    Generalized anxiety disorder    Depression    Attention deficit hyperactivity disorder, predominantly inattentive type    Displacement of lumbar intervertebral disc without myelopathy    " Anxiety    Suicidal ideation    Bipolar 1 disorder (H)

## 2025-08-04 NOTE — PLAN OF CARE
"Individual Therapy Note      Date of Service: August 4, 2025    Patient: Fuentes goes by \"Fuentes,\" uses he/him pronouns    Individuals Present: Fuentes & Charlotte Holcomb [facility]    Session start: 1307  Session end: 1337  Session duration in minutes: 30      Modality Used: CBT, Person Centered, and Brief Therapy    Goals: [place] to therapy    Patient Description of current symptoms: \"I want to stop waking up everyday thinking that I want to die.\"      Mental Status Exam:   Attitude: somewhat cooperative  Eye Contact: good  Mood: anxious and depressed  Affect: mood congruent  Speech: clear, coherent  Psychomotor Behavior: no evidence of tardive dyskinesia, dystonia, or tics  Thought Process:  linear  Associations: no loose associations  Thought Content: passive suicidal ideation present  Insight: fair  Judgment: fair  Attention Span and Concentration: intact    Pt progress: First session     Treatment Objective(s) Addressed:   The focus of this session was on rapport building and orienting the patient to therapy.     Progress Towards Goals and Assessment of Patient:   Fuentes endorsed experiencing a \"midlife crisis\" and shared that he has felt suicidal for the last year and a half, and was thinking about dying by suicide daily. We discussed his history of psychotherapy, he stated he has not had meaningful time with a psychotherapist because he often ends the working relationship when he feels he's not making progress. He could not identify any treatment plan or modality that he has had in the past, he stated it's mostly \"chit chatting.\"     He presented with a negative view of self, cognitive distortions, and hopelessness that appear to create significant barriers to mental health and wellbeing. He endorsed a lot of loss in the death of friends and a strained relationship with his son. He stated his son doesn't respond to him anymore. Appears to use avoidance as a coping strategy, which he agreed with.     We discussed anticipated " benefits of taking medications regularly and engaging in psychotherapy. It seems like a barrier to continuing in psychotherapy is a lack of structure and progress seeking, he would likely benefit from a cognitive behavioral therapy if he can find a psychotherapist that he works well with.    Therapeutic Intervention(s):   Provided active listening, unconditional positive regard, and validation. Engaged in cognitive restructuring/ reframing, looked at common cognitive distortions and challenged negative thoughts. Engaged in guided discovery, explored patient's perspectives and helped expand them through socratic dialogue. Using CBT tools and instruction to improve insight, awareness, identifying unhealthy patterns and self-talk and replacing with healthier patterns and self-talk.    Safety Plan: not yet completed    Plan for future action (include changes needed if current intervention is ineffective): Self-compassion exercises will be discussed next session.     [address]70 - Psychotherapy (with patient) - 30 (16-37*) min    Patient Active Problem List   Diagnosis    Other acne    Social phobia    Panic disorder without agoraphobia    Major depressive disorder, single episode, moderate (H)    Generalized anxiety disorder    Depression    Attention deficit hyperactivity disorder, predominantly inattentive type    Displacement of lumbar intervertebral disc without myelopathy    Anxiety    Suicidal ideation    Bipolar 1 disorder (H)

## 2025-08-05 PROCEDURE — H2032 ACTIVITY THERAPY, PER 15 MIN: HCPCS

## 2025-08-05 PROCEDURE — 124N000002 HC R&B MH UMMC

## 2025-08-05 PROCEDURE — 99232 SBSQ HOSP IP/OBS MODERATE 35: CPT

## 2025-08-05 PROCEDURE — 250N000013 HC RX MED GY IP 250 OP 250 PS 637: Performed by: PSYCHIATRY & NEUROLOGY

## 2025-08-05 PROCEDURE — 250N000013 HC RX MED GY IP 250 OP 250 PS 637

## 2025-08-05 PROCEDURE — 97150 GROUP THERAPEUTIC PROCEDURES: CPT | Mod: GO

## 2025-08-05 RX ADMIN — LURASIDONE HYDROCHLORIDE 60 MG: 40 TABLET, FILM COATED ORAL at 17:46

## 2025-08-05 RX ADMIN — QUETIAPINE FUMARATE 25 MG: 25 TABLET ORAL at 15:56

## 2025-08-05 RX ADMIN — NICOTINE POLACRILEX 4 MG: 4 GUM, CHEWING BUCCAL at 19:18

## 2025-08-05 RX ADMIN — QUETIAPINE FUMARATE 150 MG: 100 TABLET ORAL at 21:13

## 2025-08-05 RX ADMIN — DEXTROAMPHETAMINE SACCHARATE, AMPHETAMINE ASPARTATE, DEXTROAMPHETAMINE SULFATE AND AMPHETAMINE SULFATE 5 MG: 1.25; 1.25; 1.25; 1.25 TABLET ORAL at 11:40

## 2025-08-05 RX ADMIN — DEXTROAMPHETAMINE SULFATE, DEXTROAMPHETAMINE SACCHARATE, AMPHETAMINE SULFATE AND AMPHETAMINE ASPARTATE 20 MG: 5; 5; 5; 5 CAPSULE, EXTENDED RELEASE ORAL at 09:24

## 2025-08-05 RX ADMIN — Medication 250 MG: at 09:24

## 2025-08-05 RX ADMIN — DEXTROAMPHETAMINE SACCHARATE, AMPHETAMINE ASPARTATE, DEXTROAMPHETAMINE SULFATE AND AMPHETAMINE SULFATE 5 MG: 1.25; 1.25; 1.25; 1.25 TABLET ORAL at 09:24

## 2025-08-05 RX ADMIN — NICOTINE 1 PATCH: 21 PATCH, EXTENDED RELEASE TRANSDERMAL at 09:24

## 2025-08-05 ASSESSMENT — ACTIVITIES OF DAILY LIVING (ADL)
ADLS_ACUITY_SCORE: 20
ADLS_ACUITY_SCORE: 20
HYGIENE/GROOMING: INDEPENDENT
ADLS_ACUITY_SCORE: 20
ADLS_ACUITY_SCORE: 20
ORAL_HYGIENE: INDEPENDENT
ADLS_ACUITY_SCORE: 20
ADLS_ACUITY_SCORE: 20
DRESS: INDEPENDENT
ADLS_ACUITY_SCORE: 20
ORAL_HYGIENE: INDEPENDENT
ADLS_ACUITY_SCORE: 20
DRESS: INDEPENDENT
ADLS_ACUITY_SCORE: 20
HYGIENE/GROOMING: INDEPENDENT
HYGIENE/GROOMING: INDEPENDENT;SHOWER

## 2025-08-05 NOTE — PLAN OF CARE
Problem: Sleep Disturbance  Goal: Adequate Sleep/Rest  Outcome: Progressing  Intervention: Promote Sleep/Rest  Flowsheets (Taken 8/5/2025 0012)  Sleep/Rest Enhancement:   awakenings minimized   consistent schedule promoted   regular sleep/rest pattern promoted   Goal Outcome Evaluation:ongoing       Pt asleep at the start of shift. Slept a total of 7 hours per q15 safety checks. No s/s pain or discomfort noted.Even respirations , non labored breathing. No behavorial concerns.

## 2025-08-05 NOTE — PLAN OF CARE
"[place] [facility] [place] talkative towards others in the shift.  States that he slept, \" on and off,\" last night, which is an ongoing condition for him.  He denies problematic anxiety, depression, suicidal, and homicidal ideations when asked.  States he gets racing thoughts at times, but no difficulty with concentration in discussions.  He denies hallucinations when asked.  He agrees to come to staff for concerns.  Showered in the shift.    " Cigarettes

## 2025-08-05 NOTE — PROGRESS NOTES
Rehab Group    Start time: 1600  End time: 1650  Patient time total: 45 minutes    attended full group    #7 attended   Group Type: recreation   Group Topic Covered: mindfulness, Physical activity, relaxation , and self-care       Group Session Detail:  Exercise and relaxation     Patient Response/Contribution:  cooperative with task, attentive, and actively engaged       Patient Detail:    Pt actively participated in a structured Therapeutic Recreation group with a focus on leisure participation, socializing, and exercise. Pt participated in the guided exercise for the full duration of the group. Pt followed along, engaged in the guided chair exercise routine and added to the discussion prompts throughout the routine.  Pt was encouraged to use positive imagery with the deep breathing and stretching to foster relaxation, improves focus, and reduce stress.      Activity Therapy Per 15 min ()      Patient Active Problem List   Diagnosis    Other acne    Social phobia    Panic disorder without agoraphobia    Major depressive disorder, single episode, moderate (H)    Generalized anxiety disorder    Depression    Attention deficit hyperactivity disorder, predominantly inattentive type    Displacement of lumbar intervertebral disc without myelopathy    Anxiety    Suicidal ideation    Bipolar 1 disorder (H)

## 2025-08-05 NOTE — PROGRESS NOTES
"  Rehab Group    Start time: 1600  End time: 1700  Patient time total: 45 minutes    attended full group    #4 attended   Group Type: art   Group Topic Covered: activity therapy       Group Session Detail:  Art Therapy directive was to create a drawing expressing \"most resilient self\" and/or a resilient living thing that survives in a harsh environment.  Goals of directive: expressing ideas of resiliency through art, emotional expression, creating a personal self narrative, identifying personal strengths and goals.     Patient Response/Contribution:  cooperative with task       Patient Detail:    Pt was an engaged participant, focused on task for the full duration of group. Pt finished art and briefly shared with author and group. Pt shared that he created a smiling self portrait expressing pts most resilient self.  Pts mood was calm, social with peers.      Activity Therapy Per 15 min ()      Patient Active Problem List   Diagnosis    Other acne    Social phobia    Panic disorder without agoraphobia    Major depressive disorder, single episode, moderate (H)    Generalized anxiety disorder    Depression    Attention deficit hyperactivity disorder, predominantly inattentive type    Displacement of lumbar intervertebral disc without myelopathy    Anxiety    Suicidal ideation    Bipolar 1 disorder (H)      "

## 2025-08-05 NOTE — PLAN OF CARE
Problem: Anxiety Signs/Symptoms  Goal: Improved Mood Symptoms (Anxiety Signs/Symptoms)     Goal Outcome Evaluation:    Plan of Care Reviewed With: patient        Patient attended group therapy, was visible in the lounge socializing with staff and peers. Patient presents as alert and oriented, denied pain or any physical discomfort, denied SI/SIB, A/V hallucination. Patient reported anxiety and depression 7/10, contracts for safety. Prn Seroquel was given with effect. Patient does not appear to be internally preoccupied, denied medication side effect, denied bowel/bladder concern. Appetite and intake are adequate.

## 2025-08-05 NOTE — PROGRESS NOTES
"PSYCHIATRY  PROGRESS NOTE     DATE OF SERVICE   8/5/2025       CHIEF COMPLAINT   \" Better.\"       SUBJECTIVE   Per nursing documentation:    Patient presents as alert and oriented throughout the shift. Patient was visible in the milieu, attended group session, was sociable with staff and select peer. V.S are stable. Patient reported anxiety and depression 7/10. Prn Seroquel 25mg was administered. Patient denied pain, SI/SIB/HI, A/V hallucination. Patient reports good appetite and adequate oral intake, no bowel/bladder concern was reported. Patient was medication compliant. No reported concern this shift.           Patient is documented to sleep 7 hours overnight.    Per unit CTC documentation:    Unit CTC to facilitate aftercare plan.  Referrals for IRTS programs have been made and plan will be to set up outpatient psychiatry appointments.         OBJECTIVE   Met with patient in hospital room of unit 10N. Upon patient interview, patient reports their mood as, \" better.\"  Patient's affect appears blunted.  Patient reports anxiety symptoms rated 4/10  (0=none, 10=severe).  Patient describes anxiety level currently as, \"good.\"  Patient reports depression symptoms rated 6/10 (0=none, 10=severe).  Patient does acknowledge that depression symptoms have been overall improving with initiation and dose titration of Latuda.  Today patient reports some mild and tolerable grogginess with dose increase of Latuda however denies any other side effects of current medication regimen.  Patient reports ongoing intermittent thoughts of passive SI which occur immediately when he wakes up however reports he has not experienced this during the day which is an improvement.  Patient denies any active thoughts of SI, SIB, SA, intent or plan. Denies HI. Patient able to contract for safety.  Writer discussed plan to continue Latuda to 60 mg p.o. daily with supper mg p.o. daily with supper to target mood disorder symptoms.  Patient in agreement " with this plan. Patient denies any AH or VH. Patient denies any paranoid thoughts or delusions.  Patient does not report any issues with sleep overnight.  Plan will be to continue Seroquel 150-200 mg p.o. at bedtime to further target sleep promotion, and patient in agreement with this.  Patient reports appetite is stable however at baseline they experience low appetite. Denies any issue with bowel or bladder. Patient vital signs reviewed and noted to be stable. Patient denies any acute medical concerns today. Patient has no other questions or concerns.  Briefly discuss discharge plan with patient and he hopes to discharge to an IRTS program by end of week or early next week and that he is working with unit CTC regarding this.       MEDICATIONS   Medications:  Scheduled Meds:  Current Facility-Administered Medications   Medication Dose Route Frequency Provider Last Rate Last Admin    amphetamine-dextroamphetamine (ADDERALL XR) ER cap 20 mg  20 mg Oral Daily FellingYanet APRN CNP   20 mg at 08/05/25 0924    amphetamine-dextroamphetamine (ADDERALL) per tablet 5 mg  5 mg Oral BID FellingYanet APRN CNP   5 mg at 08/05/25 1140    ascorbic acid (vitamin C) chewable tablet 250 mg  250 mg Oral Daily Rosy Gifford MD   250 mg at 08/05/25 0924    lurasidone (LATUDA) tablet 60 mg  60 mg Oral Daily with supper FellingYanet APRN CNP   60 mg at 08/04/25 1747    nicotine (NICODERM CQ) 21 MG/24HR 24 hr patch 1 patch  1 patch Transdermal Daily FellingYanet APRN CNP   1 patch at 08/05/25 0924    nicotine patch REMOVAL   Transdermal At Bedtime FellingYanet APRN CNP        QUEtiapine (SEROquel) tablet 150 mg  150 mg Oral At Bedtime FellingYanet APRN CNP   150 mg at 08/04/25 2004    Or    QUEtiapine (SEROquel) tablet 200 mg  200 mg Oral At Bedtime FellingYanet APRN CNP         Continuous Infusions:  Current Facility-Administered Medications   Medication Dose Route Frequency Provider  Last Rate Last Admin     PRN Meds:.  Current Facility-Administered Medications   Medication Dose Route Frequency Provider Last Rate Last Admin    acetaminophen (TYLENOL) tablet 650 mg  650 mg Oral Q4H PRN Washington Villeda MD   650 mg at 08/01/25 1218    alum & mag hydroxide-simethicone (MAALOX) suspension 30 mL  30 mL Oral Q4H PRN Washington Villeda MD        aspirin-acetaminophen-caffeine (EXCEDRIN MIGRAINE) per tablet 1 tablet  1 tablet Oral BID PRN Yanet Moreira APRN CNP   1 tablet at 08/01/25 1429    hydrOXYzine HCl (ATARAX) tablet 25 mg  25 mg Oral Q4H PRN Yanet Moreira APRN CNP   25 mg at 08/01/25 1432    nicotine polacrilex (NICORETTE) gum 4 mg  4 mg Buccal Q1H PRN Yanet Moreira APRN CNP        OLANZapine (zyPREXA) tablet 10 mg  10 mg Oral TID PRN Washington Villeda MD   10 mg at 08/02/25 1831    Or    OLANZapine (zyPREXA) injection 10 mg  10 mg Intramuscular TID PRN Washington Villeda MD        polyethylene glycol (MIRALAX) Packet 17 g  17 g Oral Daily PRN Washington Villeda MD        QUEtiapine (SEROquel) tablet 25 mg  25 mg Oral TID PRN Rosy Gifford MD   25 mg at 08/04/25 1708    Or    QUEtiapine (SEROquel) tablet 50 mg  50 mg Oral TID PRN Rosy Gifford MD   50 mg at 08/02/25 1416    traZODone (DESYREL) tablet 50 mg  50 mg Oral At Bedtime PRN [place]otn Villeda MD   50 mg at 07/29/25 2155       Medication adherence issues: MS Med Adherence Y/N: Yes, Patient previously reports a history of noncompliance with psychiatric medications in the community  Medication side effects: MEDICATION SIDE EFFECTS: mild tolerable daytime grogginess  Benefit: Yes / No: Yes       ROS   Pertinent items are noted in HPI.       MENTAL STATUS EXAM   Vitals: /79 (BP Location: Left arm, Patient Position: Sitting, Cuff Size: Adult Regular)   Pulse 89   Temp 97.8  F (36.6  C) (Oral)   Resp 16   Ht 1.829 m (6')   Wt 84.5 kg (186 lb 4.8  "oz)   SpO2 98%   BMI 25.27 kg/m      Appearance:  Casually groomed  Mood: Reports, \"Better.\"    Affect: blunted was congruent to speech  Suicidal Ideation: none  Homicidal Ideation: none  Thought process: Goal oriented and logical  Thought content: Denies any current SI or HI, able to contract for safety.   Fund of Knowledge: Average  Attention/Concentration: Fair  Language ability:  Intact  Memory: Appears intact, not formally assessed  Insight:  fair.  Judgement: fair  Orientation: Yes, x4  Psychomotor Behavior: normal or unremarkable    Muscle Strength and Tone: MuscleStrength: Normal  Gait and Station: Normal       LABS   personally reviewed.     EKG completed on 7/30/2025 and results: Sinus rhythm, normal EKG, QTc: 431     Writer reviewed admission labs.  Patient noted to have slightly elevated anion gap: 17, lipids elevated including cholesterol: 207 plan will be to continue to monitor lab values and treat as clinically indicated.     Latest Reference Range & Units 07/29/25 14:40 07/30/25 07:31 07/30/25 13:33   Sodium 135 - 145 mmol/L  141    Potassium 3.4 - 5.3 mmol/L  4.3    Chloride 98 - 107 mmol/L  103    Carbon Dioxide (CO2) 22 - 29 mmol/L  21 (L)    Urea Nitrogen 6.0 - 20.0 mg/dL  14.2    Creatinine 0.67 - 1.17 mg/dL  0.86    GFR Estimate >60 mL/min/1.73m2  >90    Calcium 8.8 - 10.4 mg/dL  9.2    Anion Gap 7 - 15 mmol/L  17 (H)    Albumin 3.5 - 5.2 g/dL  4.3    Protein Total 6.4 - 8.3 g/dL  6.8    Alkaline Phosphatase 40 - 150 U/L  62    ALT 0 - 70 U/L  53    AST 0 - 45 U/L  43    Bilirubin Total <=1.2 mg/dL  0.5    Cholesterol <200 mg/dL  207 (H)    Glucose 70 - 99 mg/dL  112 (H)    HDL Cholesterol >=40 mg/dL  49    Estimated Average Glucose <117 mg/dL  108    Hemoglobin A1C <5.7 %  5.4    LDL Cholesterol Calculated <100 mg/dL  134 (H)    Non HDL Cholesterol <130 mg/dL  158 (H)    Triglycerides <150 mg/dL  122    Vitamin B12 232 - 1,245 pg/mL  365    Vitamin D, Total (25-Hydroxy) 20 - 50 ng/mL  22  " "  WBC 4.0 - 11.0 10e3/uL  7.2    Hemoglobin 13.3 - 17.7 g/dL  14.4    Hematocrit 40.0 - 53.0 %  43.3    Platelet Count 150 - 450 10e3/uL  227    RBC Count 4.40 - 5.90 10e6/uL  4.83    MCV 78 - 100 fL  90    MCH 26.5 - 33.0 pg  29.8    MCHC 31.5 - 36.5 g/dL  33.3    RDW 10.0 - 15.0 %  12.9    Amphetamine Qual Urine Screen Negative  Screen Negative     Fentanyl Qual Urine Screen Negative  Screen Negative     Cocaine Urine Screen Negative  Screen Negative     Benzodiazepine Urine Screen Negative  Screen Negative     Opiates Qualitative Urine Screen Negative  Screen Negative     PCP Urine Screen Negative  Screen Negative     Cannabinoids Qual Urine Screen Negative  Screen Negative     Barbiturates Qual Urine Screen Negative  Screen Negative     EKG 12-lead, tracing only    Rpt (P)   Diastolic Blood Pressure mmHg   See Comment   Systolic Blood Pressure mmHg   See Comment   (L): Data is abnormally low  (H): Data is abnormally high  (P): Preliminary  Rpt: View report in Results Review for more information    No results found for: \"PHENYTOIN\", \"PHENOBARB\", \"VALPROATE\", \"CBMZ\"       DIAGNOSIS   Principal Problem:    Bipolar 1 disorder (H), currently depressed  Suicide attempt  Anxiety disorder, unspecified  ADHD, predominantly inattentive type  History of alcohol use disorder  History of MDD  History of opiate use disorder, in remission  Cannabis use    Clinically Significant Risk Factors                                # Overweight: Estimated body mass index is 25.27 kg/m  as calculated from the following:    Height as of this encounter: 1.829 m (6').    Weight as of this encounter: 84.5 kg (186 lb 4.8 oz).          # Financial/Environmental Concerns: other (see comments) (no longer working, recently lost apartment)                Active Problem List:  Patient Active Problem List   Diagnosis    Other acne    Social phobia    Panic disorder without agoraphobia    Major depressive disorder, single episode, moderate (H)    " "Generalized anxiety disorder    Depression    Attention deficit hyperactivity disorder, predominantly inattentive type    Displacement of lumbar intervertebral disc without myelopathy    Anxiety    Suicidal ideation    Bipolar 1 disorder (H)          HOSPITAL COURSE AND ASSESSMENT   This is a 40 year old male with history of depression, anxiety Disorder, ADHD, Bipolar Disorder, Substance Use Disorder, YANA, panic, persistent depressive disorder and social phobia who presented to [facility] ED on 7/29/2025 reporting SI.  In the ED, patient reported that he put a bag over his head yesterday as a suicide attempt by suffocation.  Urine drug screening completed in the ED and negative for all substances. Patient was evaluated by provider in the ED and determined to be medically stable.  Patient subsequently admitted voluntarily to inpatient psychiatric unit 10N with attending Dr. Gifford for further psychiatric stabilization.  Upon admission, patient placed on status 15 monitoring.  Patient also placed on precautions: Suicide precautions.     At time of admission, patient reports noncompliance to psychiatric medications for the past 6 weeks.  Patient reports he did not take PTA medications due to, \"they were left in my apartment and now are in storage.\"  Patient reports that he stopped Abilify due to it being ineffective for mood disorder symptoms.  Patient also reports he trialed Prozac however did not find this medication helpful.  Patient reports that PTA Adderall has been effective for management of ADHD symptoms and that this medication helps significantly with concentration.  Patient reports he was taking Adderall XR 30 mg daily along with Adderall 10 mg instant release 2 times daily however that he has not taken this in approximately 6 weeks.  Subsequently, writer discussed option of decreasing Adderall XR to 20 mg p.o. daily and Adderall IR to 5 mg twice daily to target ADHD symptom management.  Patient in agreement with " this.  Patient also agreeable to continue trial of Latuda which was started in the ED.  Patient also reports quetiapine to be helpful for sleep and anxiety symptoms.  Due to this, writer discussed option of initiating quetiapine 25-50 mg p.o. 3 times daily as needed for irritation, anxiety, and sleep which patient is in agreement with.  Patient reports engaging in nicotine use described as vaping all day.  Patient agreeable to utilizing nicotine patch and nicotine gum for nicotine withdrawal symptoms.  Patient also agreeable to an EKG to monitor heart rhythm.  Patient reports notably poor p.o. intake prior to present to the hospital however that he is also concerned about weight gain; due to this writer placed dietitian consult to address poor p.o. intake and options for healthy snacks and writing menu options.       PLAN   1. Ongoing education given regarding diagnostic and treatment options with risks, benefits and alternatives and adequate verbalization of understanding.  2.  Medications:  Continue Adderall XR 20 mg p.o. daily to target ADHD symptoms; dose decrease due to patient reporting noncompliance to higher dosing prior to admission the hospital.  Continue Adderall IR 5 mg p.o. 2 times daily as patient reports noncompliance to medication prior to admitting to the hospital  Continue Latuda 60 mg p.o. daily with supper, must be taken with a minimum of 350 loly.  Indication: Mood disorder symptoms  Continue quetiapine 150-200 mg p.o. at bedtime, indication: Anxiety and sleep promotion  Continue nicotine 21 mg/24-hour patch, 1 patch transdermal daily for nicotine withdrawal symptoms  PRN nicotine gum 4 mg buccal every 1 hour as needed for nicotine withdrawal symptoms  PRN quetiapine 25-50 mg p.o. 3 times daily, first-line for anxiety symptoms, agitation, and sleep promotion  PRN olanzapine 10 mg p.o. 3 times daily as needed for agitation, order linked with backup olanzapine 10 mg IM injection  PRN trazodone 50  mg p.o. at bedtime as needed for sleep promotion, may repeat after 60 minutes  PRN hydroxyzine 25 mg p.o. over fours as needed for anxiety symptoms    3.  Labs/Imaging:  - None today    4. Consults:  -Hospitalist to be consulted as needed.    5. Precautions:  - Suicide precautions    6. Legal:  - Voluntary    7. Discharge planning:  -Per unit CTC        Risk Assessment: IP [facility] RISK ASSESSMENT: Patient able to contract for safety and Patient on precautions    Coordination of Care:   Treatment Plan reviewed and physician signed, Care discussed with Care/Treatment Team Members, Chart reviewed and Patient seen      Re-Certification I certify that the inpatient psychiatric facility services furnished since the previous certification were, and continue to be, medically necessary for, either, treatment which could reasonably be expected to improve the patient s condition or diagnostic study and that the hospital records indicate that the services furnished were, either, intensive treatment services, admission and related services necessary for diagnostic study, or equivalent services.     I certify that the patient continues to need, on a daily basis, active treatment furnished directly by or requiring the supervision of inpatient psychiatric facility personnel.     I estimate 7-10 days of hospitalization is necessary for proper treatment of the patient. My plans for post-hospital care for this patient are  TBD     JEEVAN Chu CNP    -    8/5/2025     -    2:00 PM    Total time  35 minutes with > 50%spent on coordination of cares and psycho-education.    This note was created with help of Dragon dictation system. Grammatical / typing errors are not intentional.    JEEVAN Chu CNP

## 2025-08-05 NOTE — PLAN OF CARE
-Attending Provider: JEEVAN Lin CNP  -Voicemail Code: [id number]  -Team Note Due: Tuesday  -Next Steps:    Follow up on IRTS referrals   Set up psychiatry         Assessment/Intervention/Current Symtoms and Care Coordination:  Chart review and met with team, discussed pt progress, symptomology, and response to treatment.  Discussed the discharge plan and any potential impediments to discharge.    [facility] met with Ce to review discharge plans. Ce expressed a continued interest in getting set up with psychiatry and seeking placement in IRTS. Ce was open to the idea of getting set up with psychiatry through [facility], and was open to meeting in person and virtually.     [facility] sent email to Kevin to ask for update regarding IRTS interview.       Discharge Plan or Goal:  Pending further stabilization, continued compliance with medications, continued activities of daily living, and development of a safe discharge plan.     Considerations: set up psychiatry, IRTS, board and lodge     Barriers to Discharge:  Impact of mental health symptoms on well being   Impact of mental health symptoms on activities of daily living  Need for medication monitoring and medication management     Referral Status:  [facility] faxed 7/31 for 3 locations, confirmed received on 8/1  [facility] faxed 7/31 for 1 location     Legal Status:  Voluntary    County:   File Number:   Start and expiration date of commitment:     Contacts:

## 2025-08-05 NOTE — PLAN OF CARE
[facility] IP Unit Acuity Rating Score (UARS)  Patient is given one point for every criteria they meet.    CRITERIA SCORING   On a 72 hour hold, court hold, committed, stay of commitment, or revocation. 0    Patient LOS on [facility] unit exceeds 20 days. 0  LOS: 7   Patient under guardianship, 55+, otherwise medically complex, or under age 11. 0   Suicide ideation without relief of precipitating factors. 1   Current plan for suicide. 1   Current plan for homicide. 0   Imminent risk or actual attempt to seriously harm another without relief of factors precipitating the attempt. 0   Severe dysfunction in daily living (ex: complete neglect for self care, extreme disruption in vegetative function, extreme deterioration in social interactions). 0   Recent (last 7 days) or current physical aggression in the ED or on unit. 0   Restraints or seclusion episode in past 72 hours. 0   Recent (last 7 days) or current verbal aggression, agitation, yelling, etc., while in the ED or unit. 0   Active psychosis. 0   Need for constant or near constant redirection (from leaving, from others, etc).  0   Intrusive or disruptive behaviors. 0   Patient requires 3 or more hours of individualized nursing care per 8-hour shift (i.e. for ADLs, meds, therapeutic interventions). 0   TOTAL 2

## 2025-08-05 NOTE — PLAN OF CARE
"  Rehab Group    Start time: 1315  End time: 1405  Patient time total: 45 minutes    attended full group    #4 attended   Group Type: occupational therapy, general health and coping, and life skills   Group Topic Covered: balanced lifestyle, coping skills, emotional regulation, mindfulness, problem solving, relationship skills/support systems, self-esteem, and social skills     Group Session Detail:  Pt actively participated in a structured occupational therapy group using the Zones of Regulation to facilitate self-awareness and emotional regulation skills. Pt actively engaged in therapeutic conversation and education about Zones of Regulation vocabulary. Pt then actively participated in a Zones of Regulation group game.      Patient Response/Contribution:  cooperative with task, organized, supportive of peers, safe use of materials/supplies, expressed understanding of topic, attentive, and actively engaged     Patient Detail:    Pt arrived to group on time. Pt received education on the Zones of Regulation, and observed to actively listen and participate in discussion throughout. During check-in, pt identified a zone (red, blue, green, yellow) from the \"Zones of Regulation\" program. Pt identified being in the \"blue zone\" as he has been feeling \"sad, depressed, and bored lately.\"     Pt engaged in game targeted at the identification and recollection of the Zones of Regulation, which pt was an active participant in. Pt was then pulled by a provider near the end of group, and did not return. Pt thanked writer before exiting on this date.         [id number] OT Group (2 or more in attendance)      Patient Active Problem List   Diagnosis    Other acne    Social phobia    Panic disorder without agoraphobia    Major depressive disorder, single episode, moderate (H)    Generalized anxiety disorder    Depression    Attention deficit hyperactivity disorder, predominantly inattentive type    Displacement of lumbar intervertebral " disc without myelopathy    Anxiety    Suicidal ideation    Bipolar 1 disorder (H)

## 2025-08-05 NOTE — PLAN OF CARE
"  Rehab Group    Start time: 1015  End time: 1100  Patient time total: 45 minutes    attended full group     #3 attended   Group Type: occupational therapy and general health and coping   Group Topic Covered: balanced lifestyle, coping skills, healthy leisure time, and mindfulness     Group Session Detail:  Pt actively participated in an outdoor occupational therapy group in the [facility]. The focus of the group was to engage in therapeutic environment outside and complete a game with peers.      Patient Response/Contribution:  positive affect, cooperative with task, supportive of peers, socially appropriate, safe use of materials/supplies, listened actively, and attentive     Patient Detail:    Pt arrived to group on time, demonstrated safe arrival to [facility] accompanied by this writer and additional staff member. Pt appeared to be upbeat throughout group. Pt observed to engage in outdoor game with multiple peers, often laughing or smiling with others. Pt opted to engage in corn [employer] game during the time outside. Pt also engaged in conversations with peers and staff, sharing information about his childhood and his \"14 year old child who enjoys playing baseball.\" Pt appeared to benefit from the time outside, and verbalized feeling refreshed at the end of group.      Pt assisted in clean up of materials utilized, and demonstrated safe departure from [facility] back to the unit.         [id number] OT Group (2 or more in attendance)      Patient Active Problem List   Diagnosis    Other acne    Social phobia    Panic disorder without agoraphobia    Major depressive disorder, single episode, moderate (H)    Generalized anxiety disorder    Depression    Attention deficit hyperactivity disorder, predominantly inattentive type    Displacement of lumbar intervertebral disc without myelopathy    Anxiety    Suicidal ideation    Bipolar 1 disorder (H)         "

## 2025-08-05 NOTE — PLAN OF CARE
"  Rehab Group    Start time: 1100  End time: 1200  Patient time total: 55 minutes    attended full group    #5 attended   Group Type: occupational therapy and OT Clinic   Group Topic Covered: cognitive activities, coping skills, healthy leisure time, problem solving, relationship skills/support systems, self-esteem, and social skills     Group Session Detail:  OT: Pt actively participated in occupational therapy clinic to facilitate coping skill exploration, creative expression within personally meaningful activities, and clinical observation of social, cognitive, and kinesthetic performance skills.     Patient Response/Contribution:  cooperative with task, organized, supportive of peers, socially appropriate, safe use of materials/supplies, listened actively, attentive, and actively engaged     Patient Detail:    Pt arrived to group on time. Pt independently decided to continue working on a stain glass art project started in past group sessions.     Pt initiated and engaged in social conversations with peers and staff. Pt requested multiples song to listen to throughout, and remained appropriate. Pt reported that he met with additional staff provider on this date, which he stated they told him he \"had a negative mindset.\" Pt did agree with this statement, and conversation was had on ways to develop a positive growth mindset. Pt was receptive to this. Pt independently cleaned materials at the end of group.         [id number] OT Group (2 or more in attendance)      Patient Active Problem List   Diagnosis    Other acne    Social phobia    Panic disorder without agoraphobia    Major depressive disorder, single episode, moderate (H)    Generalized anxiety disorder    Depression    Attention deficit hyperactivity disorder, predominantly inattentive type    Displacement of lumbar intervertebral disc without myelopathy    Anxiety    Suicidal ideation    Bipolar 1 disorder (H)         "

## 2025-08-06 PROCEDURE — 97150 GROUP THERAPEUTIC PROCEDURES: CPT | Mod: GO

## 2025-08-06 PROCEDURE — 250N000013 HC RX MED GY IP 250 OP 250 PS 637: Performed by: PSYCHIATRY & NEUROLOGY

## 2025-08-06 PROCEDURE — 124N000002 HC R&B MH UMMC

## 2025-08-06 PROCEDURE — 90853 GROUP PSYCHOTHERAPY: CPT

## 2025-08-06 PROCEDURE — 99232 SBSQ HOSP IP/OBS MODERATE 35: CPT

## 2025-08-06 PROCEDURE — 250N000013 HC RX MED GY IP 250 OP 250 PS 637

## 2025-08-06 RX ADMIN — DEXTROAMPHETAMINE SACCHARATE, AMPHETAMINE ASPARTATE, DEXTROAMPHETAMINE SULFATE AND AMPHETAMINE SULFATE 5 MG: 1.25; 1.25; 1.25; 1.25 TABLET ORAL at 13:30

## 2025-08-06 RX ADMIN — DEXTROAMPHETAMINE SACCHARATE, AMPHETAMINE ASPARTATE, DEXTROAMPHETAMINE SULFATE AND AMPHETAMINE SULFATE 5 MG: 1.25; 1.25; 1.25; 1.25 TABLET ORAL at 08:38

## 2025-08-06 RX ADMIN — LURASIDONE HYDROCHLORIDE 60 MG: 40 TABLET, FILM COATED ORAL at 17:57

## 2025-08-06 RX ADMIN — QUETIAPINE FUMARATE 150 MG: 100 TABLET ORAL at 20:59

## 2025-08-06 RX ADMIN — Medication 250 MG: at 08:37

## 2025-08-06 RX ADMIN — DEXTROAMPHETAMINE SULFATE, DEXTROAMPHETAMINE SACCHARATE, AMPHETAMINE SULFATE AND AMPHETAMINE ASPARTATE 20 MG: 5; 5; 5; 5 CAPSULE, EXTENDED RELEASE ORAL at 08:38

## 2025-08-06 RX ADMIN — NICOTINE 1 PATCH: 21 PATCH, EXTENDED RELEASE TRANSDERMAL at 08:37

## 2025-08-06 RX ADMIN — QUETIAPINE FUMARATE 25 MG: 25 TABLET ORAL at 15:16

## 2025-08-06 ASSESSMENT — ACTIVITIES OF DAILY LIVING (ADL)
ADLS_ACUITY_SCORE: 20
DRESS: SCRUBS (BEHAVIORAL HEALTH);INDEPENDENT
ADLS_ACUITY_SCORE: 20
HYGIENE/GROOMING: INDEPENDENT
ADLS_ACUITY_SCORE: 20
ADLS_ACUITY_SCORE: 20
ORAL_HYGIENE: INDEPENDENT

## 2025-08-06 NOTE — PLAN OF CARE
"  Rehab Group    Start time: 1015  End time: 1115  Patient time total: 55 minutes    attended full group    #5 attended   Group Type: occupational therapy and OT Clinic   Group Topic Covered: balanced lifestyle, coping skills, healthy leisure time, problem solving, relationship skills/support systems, self-esteem, and social skills     Group Session Detail:  OT: Pt actively participated in occupational therapy clinic to facilitate coping skill exploration, creative expression within personally meaningful activities, and clinical observation of social, cognitive, and kinesthetic performance skills.     Patient Response/Contribution:  cooperative with task, organized, supportive of peers, socially appropriate, safe use of materials/supplies, attentive, and actively engaged     Patient Detail:    Pt arrived to group on time. Pt presented as pleasant and somewhat upbeat on this date. Pt expressed excitement with \"watching a baseball game yesterday.\" Pt independently decided to continue a stain glass staining that pt has been working on for multiple sessions.     Pt requested multiple songs to listen to throughout group, observed to enjoy when songs he requested were playing. Pt criticized his work often, which pt has also done in multiple groups in the past. Pt engaged in social interactions with peers and staff throughout group. Pt able to clean space and materials at the end of group before exiting.         [id number] OT Group (2 or more in attendance)      Patient Active Problem List   Diagnosis    Other acne    Social phobia    Panic disorder without agoraphobia    Major depressive disorder, single episode, moderate (H)    Generalized anxiety disorder    Depression    Attention deficit hyperactivity disorder, predominantly inattentive type    Displacement of lumbar intervertebral disc without myelopathy    Anxiety    Suicidal ideation    Bipolar 1 disorder (H)         " Patient has completed 3 days of ceftriaxone for UTI. Urine culture final with no growth. No major fevers and WBCs are within normal range. Please consider discontinuing antibiotics. Thanks, Yodit Pruitt, PHARMD

## 2025-08-06 NOTE — PLAN OF CARE
Problem: Adult Behavioral Health Plan of Care  Goal: Optimized Coping Skills in Response to Life Stressors  Outcome: Not Progressing    Pt presented as alert and oriented to place and self throughout shift.  They were primarily visible in the milieu during the day.  Pt did attend OT groups when available.   They were dressed appropriately and appeared adequately hygenic.  Pt is eating and drinking adequately.  They were compliant with their scheduled medications.  Pt did not request or require any PRNs on this shift.  VSS and no issues with bowel or bladder.  Pt endorsed mild anxiety and depression, however states this is likely baseline.  They did not endorse any symptoms of psychosis.  Pt denied pain or any acute physical health concerns.    Goal Outcome Evaluation:    Plan of Care Reviewed With: patient

## 2025-08-06 NOTE — PLAN OF CARE
Problem: Adult Behavioral Health Plan of Care  Goal: Adheres to Safety Considerations for Self and Others  Outcome: Progressing  Intervention: Develop and Maintain Individualized Safety Plan  Recent Flowsheet Documentation  Taken 8/6/2025 0300 by Karri Pantoja RN  Safety Measures: safety rounds completed     Problem: Sleep Disturbance  Goal: Adequate Sleep/Rest  Outcome: Progressing     Problem: Anxiety Signs/Symptoms  Goal: Improved Sleep (Anxiety Signs/Symptoms)  Intervention: Promote Healthy Sleep Hygiene  Recent Flowsheet Documentation  Taken 8/6/2025 0300 by Karri Pantoja RN  Sleep Hygiene Promotion: regular sleep pattern promoted   Goal Outcome Evaluation:       Patient was calm and was seen sleeping, no pain reported and no medication requested. Breathing appeared normal during safety checks, and no distress was observed.      Sleep Hours:7

## 2025-08-06 NOTE — PROGRESS NOTES
"PSYCHIATRY  PROGRESS NOTE     DATE OF SERVICE   8/6/2025       CHIEF COMPLAINT   \" Better.\"       SUBJECTIVE   Per nursing documentation:    Patient attended group therapy, was visible in the lounge socializing with staff and peers. Patient presents as alert and oriented, denied pain or any physical discomfort, denied SI/SIB, A/V hallucination. Patient reported anxiety and depression 7/10, contracts for safety. Prn Seroquel was given with effect. Patient does not appear to be internally preoccupied, denied medication side effect, denied bowel/bladder concern. Appetite and intake are adequate.     Patient is documented to sleep 7 hours overnight.    Per unit CTC documentation:    Unit CTC to facilitate aftercare plan.  Referrals for IRTS programs have been made and plan will be to set up outpatient psychiatry appointments.         OBJECTIVE   Met with patient in hospital room of unit 10N. Upon patient interview, patient reports their mood as, \" better.\"  Patient's affect appears blunted however slightly brighter and improving.  Patient reports anxiety symptoms which are situational today and related to interview scheduled for IRTS program later today. Patient reports depression symptoms rated 6/10 (0=none, 10=severe).  Patient does acknowledge that depression symptoms have been overall improving with initiation and dose titration of Latuda.  Today patient denies any side effects of current medication regimen.  Patient reports ongoing thoughts of passive SI which occur immediately when he wakes up however resolved after an hour.  Reports he has not experienced any passive SI during the day which is an improvement for him.  Patient denies any active thoughts of SI, SIB, SA, intent or plan. Denies HI. Patient able to contract for safety.  Writer discussed plan to continue Latuda to 60 mg p.o. daily with supper mg p.o. daily with supper to target mood disorder symptoms.  Patient in agreement with this plan. Patient " denies any AH or VH. Patient denies any paranoid thoughts or delusions.  Patient does not report any issues with sleep overnight.  Plan will be to continue Seroquel 150-200 mg p.o. at bedtime to further target sleep promotion, and patient in agreement with this.  Patient reports appetite is stable however at baseline they experience low appetite. Denies any issue with bowel or bladder. Patient vital signs reviewed and noted to be stable. Patient denies any acute medical concerns today. Patient has no other questions or concerns.  Briefly discuss discharge plan with patient and he hopes to discharge to an IRTS program by end of week or early next week and that he is working with unit CTC regarding this.       MEDICATIONS   Medications:  Scheduled Meds:  Current Facility-Administered Medications   Medication Dose Route Frequency Provider Last Rate Last Admin    amphetamine-dextroamphetamine (ADDERALL XR) ER cap 20 mg  20 mg Oral Daily Yanet Moreira APRN CNP   20 mg at 08/06/25 0838    amphetamine-dextroamphetamine (ADDERALL) per tablet 5 mg  5 mg Oral BID FellingYanet APRN CNP   5 mg at 08/06/25 1330    ascorbic acid (vitamin C) chewable tablet 250 mg  250 mg Oral Daily Rosy Gifford MD   250 mg at 08/06/25 0837    lurasidone (LATUDA) tablet 60 mg  60 mg Oral Daily with supper FellYanet vanegas APRN CNP   60 mg at 08/05/25 1746    nicotine (NICODERM CQ) 21 MG/24HR 24 hr patch 1 patch  1 patch Transdermal Daily FellingYanet APRN CNP   1 patch at 08/06/25 0837    nicotine patch REMOVAL   Transdermal At Bedtime Yanet Moreira APRN CNP        QUEtiapine (SEROquel) tablet 150 mg  150 mg Oral At Bedtime FellingYanet APRN CNP   150 mg at 08/05/25 2113    Or    QUEtiapine (SEROquel) tablet 200 mg  200 mg Oral At Bedtime Yanet Moreira APRN CNP         Continuous Infusions:  Current Facility-Administered Medications   Medication Dose Route Frequency Provider Last Rate Last Admin      PRN Meds:.  Current Facility-Administered Medications   Medication Dose Route Frequency Provider Last Rate Last Admin    acetaminophen (TYLENOL) tablet 650 mg  650 mg Oral Q4H PRN [place] Villeda MD   650 mg at 08/01/25 1218    alum & mag hydroxide-simethicone (MAALOX) suspension 30 mL  30 mL Oral Q4H PRN Washington Villeda MD        aspirin-acetaminophen-caffeine (EXCEDRIN MIGRAINE) per tablet 1 tablet  1 tablet Oral BID PRN Yanet Moreira APRN CNP   1 tablet at 08/01/25 1429    hydrOXYzine HCl (ATARAX) tablet 25 mg  25 mg Oral Q4H PRN Yanet Moreira APRN CNP   25 mg at 08/01/25 1432    nicotine polacrilex (NICORETTE) gum 4 mg  4 mg Buccal Q1H PRN Yanet Moreira APRN CNP   4 mg at 08/05/25 1918    OLANZapine (zyPREXA) tablet 10 mg  10 mg Oral TID PRN Washington Villeda MD   10 mg at 08/02/25 1831    Or    OLANZapine (zyPREXA) injection 10 mg  10 mg Intramuscular TID PRN Washington Villeda MD        polyethylene glycol (MIRALAX) Packet 17 g  17 g Oral Daily PRN Washington Villeda MD        QUEtiapine (SEROquel) tablet 25 mg  25 mg Oral TID PRN Rosy Gifford MD   25 mg at 08/06/25 1516    Or    QUEtiapine (SEROquel) tablet 50 mg  50 mg Oral TID PRN Rosy Gifford MD   50 mg at 08/02/25 1416    traZODone (DESYREL) tablet 50 mg  50 mg Oral At Bedtime PRN Washington Villeda MD   50 mg at 07/29/25 2155       Medication adherence issues: MS Med Adherence Y/N: Yes, Patient previously reports a history of noncompliance with psychiatric medications in the community  Medication side effects: MEDICATION SIDE EFFECTS: Denies  Benefit: Yes / No: Yes       ROS   Pertinent items are noted in HPI.       MENTAL STATUS EXAM   Vitals: /83 (BP Location: Left arm)   Pulse 95   Temp 98.7  F (37.1  C) (Oral)   Resp 16   Ht 1.829 m (6')   Wt 84.5 kg (186 lb 4.8 oz)   SpO2 99%   BMI 25.27 kg/m      Appearance:  Casually groomed  Mood:  "Reports, \"Better.\"    Affect: blunted was congruent to speech  Suicidal Ideation: none currently  Homicidal Ideation: none  Thought process: Goal oriented and logical  Thought content: Denies any current SI or HI, able to contract for safety.   Fund of Knowledge: Average  Attention/Concentration: Fair  Language ability:  Intact  Memory: Appears intact, not formally assessed  Insight:  fair.  Judgement: fair  Orientation: Yes, x4  Psychomotor Behavior: normal or unremarkable    Muscle Strength and Tone: MuscleStrength: Normal  Gait and Station: Normal       LABS   personally reviewed.     EKG completed on 7/30/2025 and results: Sinus rhythm, normal EKG, QTc: 431     Writer reviewed admission labs.  Patient noted to have slightly elevated anion gap: 17, lipids elevated including cholesterol: 207 plan will be to continue to monitor lab values and treat as clinically indicated.     Latest Reference Range & Units 07/29/25 14:40 07/30/25 07:31 07/30/25 13:33   Sodium 135 - 145 mmol/L  141    Potassium 3.4 - 5.3 mmol/L  4.3    Chloride 98 - 107 mmol/L  103    Carbon Dioxide (CO2) 22 - 29 mmol/L  21 (L)    Urea Nitrogen 6.0 - 20.0 mg/dL  14.2    Creatinine 0.67 - 1.17 mg/dL  0.86    GFR Estimate >60 mL/min/1.73m2  >90    Calcium 8.8 - 10.4 mg/dL  9.2    Anion Gap 7 - 15 mmol/L  17 (H)    Albumin 3.5 - 5.2 g/dL  4.3    Protein Total 6.4 - 8.3 g/dL  6.8    Alkaline Phosphatase 40 - 150 U/L  62    ALT 0 - 70 U/L  53    AST 0 - 45 U/L  43    Bilirubin Total <=1.2 mg/dL  0.5    Cholesterol <200 mg/dL  207 (H)    Glucose 70 - 99 mg/dL  112 (H)    HDL Cholesterol >=40 mg/dL  49    Estimated Average Glucose <117 mg/dL  108    Hemoglobin A1C <5.7 %  5.4    LDL Cholesterol Calculated <100 mg/dL  134 (H)    Non HDL Cholesterol <130 mg/dL  158 (H)    Triglycerides <150 mg/dL  122    Vitamin B12 232 - 1,245 pg/mL  365    Vitamin D, Total (25-Hydroxy) 20 - 50 ng/mL  22    WBC 4.0 - 11.0 10e3/uL  7.2    Hemoglobin 13.3 - 17.7 g/dL  14.4  " "  Hematocrit 40.0 - 53.0 %  43.3    Platelet Count 150 - 450 10e3/uL  227    RBC Count 4.40 - 5.90 10e6/uL  4.83    MCV 78 - 100 fL  90    MCH 26.5 - 33.0 pg  29.8    MCHC 31.5 - 36.5 g/dL  33.3    RDW 10.0 - 15.0 %  12.9    Amphetamine Qual Urine Screen Negative  Screen Negative     Fentanyl Qual Urine Screen Negative  Screen Negative     Cocaine Urine Screen Negative  Screen Negative     Benzodiazepine Urine Screen Negative  Screen Negative     Opiates Qualitative Urine Screen Negative  Screen Negative     PCP Urine Screen Negative  Screen Negative     Cannabinoids Qual Urine Screen Negative  Screen Negative     Barbiturates Qual Urine Screen Negative  Screen Negative     EKG 12-lead, tracing only    Rpt (P)   Diastolic Blood Pressure mmHg   See Comment   Systolic Blood Pressure mmHg   See Comment   (L): Data is abnormally low  (H): Data is abnormally high  (P): Preliminary  Rpt: View report in Results Review for more information    No results found for: \"PHENYTOIN\", \"PHENOBARB\", \"VALPROATE\", \"CBMZ\"       DIAGNOSIS   Principal Problem:    Bipolar 1 disorder (H), currently depressed  Suicide attempt  Anxiety disorder, unspecified  ADHD, predominantly inattentive type  History of alcohol use disorder  History of MDD  History of opiate use disorder, in remission  Cannabis use    Clinically Significant Risk Factors                                # Overweight: Estimated body mass index is 25.27 kg/m  as calculated from the following:    Height as of this encounter: 1.829 m (6').    Weight as of this encounter: 84.5 kg (186 lb 4.8 oz).          # Financial/Environmental Concerns: other (see comments) (no longer working, recently lost apartment)                Active Problem List:  Patient Active Problem List   Diagnosis    Other acne    Social phobia    Panic disorder without agoraphobia    Major depressive disorder, single episode, moderate (H)    Generalized anxiety disorder    Depression    Attention deficit " "hyperactivity disorder, predominantly inattentive type    Displacement of lumbar intervertebral disc without myelopathy    Anxiety    Suicidal ideation    Bipolar 1 disorder (H)          HOSPITAL COURSE AND ASSESSMENT   This is a 40 year old male with history of depression, anxiety Disorder, ADHD, Bipolar Disorder, Substance Use Disorder, YANA, panic, persistent depressive disorder and social phobia who presented to [facility] ED on 7/29/2025 reporting SI.  In the ED, patient reported that he put a bag over his head yesterday as a suicide attempt by suffocation.  Urine drug screening completed in the ED and negative for all substances. Patient was evaluated by provider in the ED and determined to be medically stable.  Patient subsequently admitted voluntarily to inpatient psychiatric unit 10N with attending Dr. Gifford for further psychiatric stabilization.  Upon admission, patient placed on status 15 monitoring.  Patient also placed on precautions: Suicide precautions.     At time of admission, patient reports noncompliance to psychiatric medications for the past 6 weeks.  Patient reports he did not take PTA medications due to, \"they were left in my apartment and now are in storage.\"  Patient reports that he stopped Abilify due to it being ineffective for mood disorder symptoms.  Patient also reports he trialed Prozac however did not find this medication helpful.  Patient reports that PTA Adderall has been effective for management of ADHD symptoms and that this medication helps significantly with concentration.  Patient reports he was taking Adderall XR 30 mg daily along with Adderall 10 mg instant release 2 times daily however that he has not taken this in approximately 6 weeks.  Subsequently, writer discussed option of decreasing Adderall XR to 20 mg p.o. daily and Adderall IR to 5 mg twice daily to target ADHD symptom management.  Patient in agreement with this.  Patient also agreeable to continue trial of Latuda which " was started in the ED.  Patient also reports quetiapine to be helpful for sleep and anxiety symptoms.  Due to this, writer discussed option of initiating quetiapine 25-50 mg p.o. 3 times daily as needed for irritation, anxiety, and sleep which patient is in agreement with.  Patient reports engaging in nicotine use described as vaping all day.  Patient agreeable to utilizing nicotine patch and nicotine gum for nicotine withdrawal symptoms.  Patient also agreeable to an EKG to monitor heart rhythm.  Patient reports notably poor p.o. intake prior to present to the hospital however that he is also concerned about weight gain; due to this writer placed dietitian consult to address poor p.o. intake and options for healthy snacks and writing menu options.       PLAN   1. Ongoing education given regarding diagnostic and treatment options with risks, benefits and alternatives and adequate verbalization of understanding.  2.  Medications:  Continue Adderall XR 20 mg p.o. daily to target ADHD symptoms; dose decrease due to patient reporting noncompliance to higher dosing prior to admission the hospital.  Continue Adderall IR 5 mg p.o. 2 times daily as patient reports noncompliance to medication prior to admitting to the hospital  Continue Latuda 60 mg p.o. daily with supper, must be taken with a minimum of 350 loly.  Indication: Mood disorder symptoms  Continue quetiapine 150-200 mg p.o. at bedtime, indication: Anxiety and sleep promotion  Continue nicotine 21 mg/24-hour patch, 1 patch transdermal daily for nicotine withdrawal symptoms  PRN nicotine gum 4 mg buccal every 1 hour as needed for nicotine withdrawal symptoms  PRN quetiapine 25-50 mg p.o. 3 times daily, first-line for anxiety symptoms, agitation, and sleep promotion  PRN olanzapine 10 mg p.o. 3 times daily as needed for agitation, order linked with backup olanzapine 10 mg IM injection  PRN trazodone 50 mg p.o. at bedtime as needed for sleep promotion, may repeat  after 60 minutes  PRN hydroxyzine 25 mg p.o. over fours as needed for anxiety symptoms    3.  Labs/Imaging:  - None today    4. Consults:  -Hospitalist to be consulted as needed.    5. Precautions:  - Suicide precautions    6. Legal:  - Voluntary    7. Discharge planning:  -Per unit CTC        Risk Assessment: IP [facility] RISK ASSESSMENT: Patient able to contract for safety and Patient on precautions    Coordination of Care:   Treatment Plan reviewed and physician signed, Care discussed with Care/Treatment Team Members, Chart reviewed and Patient seen      Re-Certification I certify that the inpatient psychiatric facility services furnished since the previous certification were, and continue to be, medically necessary for, either, treatment which could reasonably be expected to improve the patient s condition or diagnostic study and that the hospital records indicate that the services furnished were, either, intensive treatment services, admission and related services necessary for diagnostic study, or equivalent services.     I certify that the patient continues to need, on a daily basis, active treatment furnished directly by or requiring the supervision of inpatient psychiatric facility personnel.     I estimate 7-10 days of hospitalization is necessary for proper treatment of the patient. My plans for post-hospital care for this patient are  TBD     JEEVAN Chu CNP    -    8/6/2025     -    12:24 PM    Total time  35 minutes with > 50%spent on coordination of cares and psycho-education.    This note was created with help of Dragon dictation system. Grammatical / typing errors are not intentional.    JEEVAN Chu CNP

## 2025-08-06 NOTE — PLAN OF CARE
BEH IP Unit Acuity Rating Score (UARS)  Patient is given one point for every criteria they meet.    CRITERIA SCORING   On a 72 hour hold, court hold, committed, stay of commitment, or revocation. 0    Patient LOS on BEH unit exceeds 20 days. 0  LOS: 8   Patient under guardianship, 55+, otherwise medically complex, or under age 11. 0   Suicide ideation without relief of precipitating factors. 1   Current plan for suicide. 1   Current plan for homicide. 0   Imminent risk or actual attempt to seriously harm another without relief of factors precipitating the attempt. 0   Severe dysfunction in daily living (ex: complete neglect for self care, extreme disruption in vegetative function, extreme deterioration in social interactions). 0   Recent (last 7 days) or current physical aggression in the ED or on unit. 0   Restraints or seclusion episode in past 72 hours. 0   Recent (last 7 days) or current verbal aggression, agitation, yelling, etc., while in the ED or unit. 0   Active psychosis. 0   Need for constant or near constant redirection (from leaving, from others, etc).  0   Intrusive or disruptive behaviors. 0   Patient requires 3 or more hours of individualized nursing care per 8-hour shift (i.e. for ADLs, meds, therapeutic interventions). 0   TOTAL 2

## 2025-08-06 NOTE — PLAN OF CARE
-Attending Provider: JEEVAN Lin CNP  -Voicemail Code: [id number]  -Team Note Due: Tuesday  -Next Steps:    Discharge Friday 8/8 @ 10:30am  7 day supply of medication filled @  pharmacy  21 day supply sent to [facility] will order cab  AVS not complete  Confirm psychiatry         Assessment/Intervention/Current Symtoms and Care Coordination:  Chart review and met with team, discussed pt progress, symptomology, and response to treatment.  Discussed the discharge plan and any potential impediments to discharge.    [facility] exchanged phone calls and emails with [facility] at [facility]. [facility] contracted plan to interview Fuentes at 12:30.     [facility] informed Fuentes of [facility]'s request to interview him at 12:30pm. He was agreeable to this.     [facility]uentes completed phone interview and reported it went well to [facility].     [facility] exchanged additional emails with [facility] who also confirmed the interview went well and they would like to accept him for Friday 8/8.      Discharge Plan or Goal:  Pending further stabilization, continued compliance with medications, continued activities of daily living, and development of a safe discharge plan.     Considerations: set up psychiatry    Barriers to Discharge:  Impact of mental health symptoms on well being   Impact of mental health symptoms on activities of daily living  Need for medication monitoring and medication management     Referral Status:  [facility] faxed 7/31 for 3 locations, confirmed received on 8/1, completed interview on 8/6 and approved for admission of 8/8  [facility] faxed 7/31 for 1 location     Legal Status:  Voluntary    County:   File Number:   Start and expiration date of commitment:     Contacts:

## 2025-08-06 NOTE — PLAN OF CARE
Group Attendance:  attended full group    Time session began: 1300  Time session ended: 1400  Patient's total time in group: 60 minutes    Total # Attendees   4   Group Type Dance movement therapy     Group Topic Covered symptom management, healthy coping skills, and relaxation and grounding techniques/coping skills     Group Session Detail Group members were cohesive and offered each other support and affirmation. Together, they explored increasing integration and complexities within the body as reflected in their current symptoms.  The warm-up was a yoga-based stretching, leading into an expansion of movement quality options, then cognitive organization through rhythm and synchrony.  Patients found their own creative self-expression by choosing any combinations of movements they explored in the session.      Patient's response to the group topic/interactions:  positive affect, cooperative with task, organized, supportive of peers, socially appropriate, listened actively, expressed understanding of topic, attentive, and actively engaged     Patient Details: Patient was clear and calm, but vitalized.  He engaged more comfortably with movement in this session than previous groups with this therapist.  He encouraged others to join and helped peers feel comfortable.  He expressed understanding of the topic and application to his life.  He was increasingly more fluid and integrated.           [id number] - Group psychotherapy - 1 Session  Patient Active Problem List   Diagnosis    Other acne    Social phobia    Panic disorder without agoraphobia    Major depressive disorder, single episode, moderate (H)    Generalized anxiety disorder    Depression    Attention deficit hyperactivity disorder, predominantly inattentive type    Displacement of lumbar intervertebral disc without myelopathy    Anxiety    Suicidal ideation    Bipolar 1 disorder (H)

## 2025-08-06 NOTE — PLAN OF CARE
"  Rehab Group    Start time: 1120  End time: 1205  Patient time total: 45 minutes    attended full group    #5 attended   Group Type: occupational therapy, general health and coping, and relaxation   Group Topic Covered: balanced lifestyle, coping skills, mindfulness, problem solving, relationship skills/support systems, and social skills     Group Session Detail:  Pt actively participated in a structured occupational therapy group with the focus of coping skills to facilitate mindfulness and relaxation. Pt participated in various breathing and mindfulness exercises targeting attention and memory. Pt was receptive to learning a new breathing technique to implement as a coping skill.     Patient Response/Contribution:  cooperative with task, supportive of peers, socially appropriate, listened actively, expressed understanding of topic, attentive, and actively engaged     Patient Detail:    Pt arrived to group on time. During gratitude sharing exercise, pt shared \"I am grateful for my son and being able to watch a baseball game last night.\" Pt also shared that \"no one has visited him while he has been here.\" Pt stated, he \"is not upset about it\" and that it \"is just an observation.\" Pt was attentive to exercises, and listened to peers during discussion.     Pt demonstrated ability to learn new breathing strategies, engage in gentle movements, and identify a positivity affirmation card. Pt thanked writer before exiting on this date.         [id number] OT Group (2 or more in attendance)      Patient Active Problem List   Diagnosis    Other acne    Social phobia    Panic disorder without agoraphobia    Major depressive disorder, single episode, moderate (H)    Generalized anxiety disorder    Depression    Attention deficit hyperactivity disorder, predominantly inattentive type    Displacement of lumbar intervertebral disc without myelopathy    Anxiety    Suicidal ideation    Bipolar 1 disorder (H)         "

## 2025-08-06 NOTE — PLAN OF CARE
Group Attendance:  attended full group    Time session began: 1415  Time session ended: 1447  Patient's total time in group: 32    Total # Attendees   4   Group Type psychotherapeutic     Group Topic Covered Somatic experiencing     Group Session Detail Participants received education on the vagus nerve and nervous system regulation. Exercises to stimulate the vagus nerve were practiced.       Patient's response to the group topic/interactions:  actively engaged     Patient Details: Fuentes was an active participant in the exercises. He was not familiar with most of the exercises but practiced them and endorsed benefit.          [id number] - Group psychotherapy - 1 Session  Patient Active Problem List   Diagnosis    Other acne    Social phobia    Panic disorder without agoraphobia    Major depressive disorder, single episode, moderate (H)    Generalized anxiety disorder    Depression    Attention deficit hyperactivity disorder, predominantly inattentive type    Displacement of lumbar intervertebral disc without myelopathy    Anxiety    Suicidal ideation    Bipolar 1 disorder (H)

## 2025-08-07 ENCOUNTER — PATIENT OUTREACH (OUTPATIENT)
Dept: CARE COORDINATION | Facility: CLINIC | Age: 40
End: 2025-08-07
Payer: COMMERCIAL

## 2025-08-07 VITALS
DIASTOLIC BLOOD PRESSURE: 70 MMHG | WEIGHT: 191 LBS | BODY MASS INDEX: 25.87 KG/M2 | SYSTOLIC BLOOD PRESSURE: 134 MMHG | HEART RATE: 100 BPM | OXYGEN SATURATION: 99 % | HEIGHT: 72 IN | TEMPERATURE: 97.7 F | RESPIRATION RATE: 16 BRPM

## 2025-08-07 PROCEDURE — 99232 SBSQ HOSP IP/OBS MODERATE 35: CPT

## 2025-08-07 PROCEDURE — 97150 GROUP THERAPEUTIC PROCEDURES: CPT | Mod: GO

## 2025-08-07 PROCEDURE — 250N000013 HC RX MED GY IP 250 OP 250 PS 637

## 2025-08-07 PROCEDURE — 250N000013 HC RX MED GY IP 250 OP 250 PS 637: Performed by: PSYCHIATRY & NEUROLOGY

## 2025-08-07 PROCEDURE — 124N000002 HC R&B MH UMMC

## 2025-08-07 RX ORDER — HYDROXYZINE HYDROCHLORIDE 25 MG/1
25 TABLET, FILM COATED ORAL 2 TIMES DAILY PRN
Qty: 14 TABLET | Refills: 0 | Status: SHIPPED | OUTPATIENT
Start: 2025-08-07 | End: 2025-08-08

## 2025-08-07 RX ORDER — ACETAMINOPHEN, ASPIRIN AND CAFFEINE 250; 250; 65 MG/1; MG/1; MG/1
1 TABLET ORAL 2 TIMES DAILY PRN
Qty: 7 TABLET | Refills: 0 | Status: SHIPPED | OUTPATIENT
Start: 2025-08-07

## 2025-08-07 RX ORDER — QUETIAPINE FUMARATE 50 MG/1
150-200 TABLET, FILM COATED ORAL AT BEDTIME
Qty: 28 TABLET | Refills: 0 | Status: SHIPPED | OUTPATIENT
Start: 2025-08-07 | End: 2025-08-08

## 2025-08-07 RX ORDER — NICOTINE 21 MG/24HR
1 PATCH, TRANSDERMAL 24 HOURS TRANSDERMAL DAILY
Qty: 7 PATCH | Refills: 0 | Status: SHIPPED | OUTPATIENT
Start: 2025-08-08 | End: 2025-08-08

## 2025-08-07 RX ORDER — DEXTROAMPHETAMINE SACCHARATE, AMPHETAMINE ASPARTATE, DEXTROAMPHETAMINE SULFATE AND AMPHETAMINE SULFATE 1.25; 1.25; 1.25; 1.25 MG/1; MG/1; MG/1; MG/1
5 TABLET ORAL 2 TIMES DAILY
Qty: 14 TABLET | Refills: 0 | Status: SHIPPED | OUTPATIENT
Start: 2025-08-07 | End: 2025-08-08

## 2025-08-07 RX ORDER — DEXTROAMPHETAMINE SACCHARATE, AMPHETAMINE ASPARTATE MONOHYDRATE, DEXTROAMPHETAMINE SULFATE AND AMPHETAMINE SULFATE 5; 5; 5; 5 MG/1; MG/1; MG/1; MG/1
20 CAPSULE, EXTENDED RELEASE ORAL DAILY
Qty: 7 CAPSULE | Refills: 0 | Status: SHIPPED | OUTPATIENT
Start: 2025-08-08 | End: 2025-08-08

## 2025-08-07 RX ORDER — LURASIDONE HYDROCHLORIDE 60 MG/1
60 TABLET, FILM COATED ORAL
Qty: 7 TABLET | Refills: 0 | Status: SHIPPED | OUTPATIENT
Start: 2025-08-07 | End: 2025-08-08

## 2025-08-07 RX ORDER — QUETIAPINE FUMARATE 25 MG/1
25 TABLET, FILM COATED ORAL 2 TIMES DAILY PRN
Qty: 14 TABLET | Refills: 0 | Status: SHIPPED | OUTPATIENT
Start: 2025-08-07 | End: 2025-08-08

## 2025-08-07 RX ADMIN — QUETIAPINE FUMARATE 25 MG: 25 TABLET ORAL at 16:25

## 2025-08-07 RX ADMIN — LURASIDONE HYDROCHLORIDE 60 MG: 40 TABLET, FILM COATED ORAL at 18:08

## 2025-08-07 RX ADMIN — DEXTROAMPHETAMINE SACCHARATE, AMPHETAMINE ASPARTATE, DEXTROAMPHETAMINE SULFATE AND AMPHETAMINE SULFATE 5 MG: 1.25; 1.25; 1.25; 1.25 TABLET ORAL at 12:39

## 2025-08-07 RX ADMIN — Medication 250 MG: at 08:48

## 2025-08-07 RX ADMIN — NICOTINE POLACRILEX 4 MG: 4 GUM, CHEWING BUCCAL at 13:03

## 2025-08-07 RX ADMIN — QUETIAPINE FUMARATE 150 MG: 100 TABLET ORAL at 20:54

## 2025-08-07 RX ADMIN — DEXTROAMPHETAMINE SULFATE, DEXTROAMPHETAMINE SACCHARATE, AMPHETAMINE SULFATE AND AMPHETAMINE ASPARTATE 20 MG: 5; 5; 5; 5 CAPSULE, EXTENDED RELEASE ORAL at 08:48

## 2025-08-07 RX ADMIN — DEXTROAMPHETAMINE SACCHARATE, AMPHETAMINE ASPARTATE, DEXTROAMPHETAMINE SULFATE AND AMPHETAMINE SULFATE 5 MG: 1.25; 1.25; 1.25; 1.25 TABLET ORAL at 08:48

## 2025-08-07 RX ADMIN — NICOTINE 1 PATCH: 21 PATCH, EXTENDED RELEASE TRANSDERMAL at 08:48

## 2025-08-07 RX ADMIN — NICOTINE POLACRILEX 4 MG: 4 GUM, CHEWING BUCCAL at 19:22

## 2025-08-07 ASSESSMENT — ACTIVITIES OF DAILY LIVING (ADL)
ADLS_ACUITY_SCORE: 20
HYGIENE/GROOMING: INDEPENDENT
ADLS_ACUITY_SCORE: 20
HYGIENE/GROOMING: INDEPENDENT
ADLS_ACUITY_SCORE: 20
ADLS_ACUITY_SCORE: 20
ORAL_HYGIENE: INDEPENDENT
ADLS_ACUITY_SCORE: 20
DRESS: SCRUBS (BEHAVIORAL HEALTH);INDEPENDENT
ADLS_ACUITY_SCORE: 20
ORAL_HYGIENE: INDEPENDENT
DRESS: SCRUBS (BEHAVIORAL HEALTH);INDEPENDENT
ADLS_ACUITY_SCORE: 20

## 2025-08-07 NOTE — PLAN OF CARE
"  Rehab Group    Start time: 1115  End time: 1200  Patient time total: 45 minutes    attended full group    #5 attended   Group Type: occupational therapy and general health and coping   Group Topic Covered: balanced lifestyle, coping skills, healthy leisure time, hygiene, mindfulness, problem solving, and relationship skills/support systems     Group Session Detail:  Pt actively participated in a structured occupational therapy group (Eight Dimensions of Wellness) with the focus on general health and wellbeing to facilitate self-reflection and the development of future-driven goals. Pt received education and participated in a discussion regarding each dimension of wellness. Pt then participated in a worksheet based activity including complete a self-assessment, identifying strengths, reflecting on areas of deficits, and creating goals to promote or enhance chosen dimension of wellness.     Patient Response/Contribution:  cooperative with task, socially appropriate, safe use of materials/supplies, listened actively, expressed understanding of topic, attentive, and actively engaged     Patient Detail:    Pt arrived to group on time. Pt was an active participate in discussion, offered multiple ideas to each dimension of wellness. Pt verbalized understanding of purpose of activity.     Pt then completed two wellness worksheets, which he chose to complete a financial and spiritual worksheet. Pt finished both worksheets, and then engaged in discussion. Pt shared that his goals are to \"mediate successfully\" and \"improve his fiances.\" Pt was an active listener when peers shared.         [id number] OT Group (2 or more in attendance)      Patient Active Problem List   Diagnosis    Other acne    Social phobia    Panic disorder without agoraphobia    Major depressive disorder, single episode, moderate (H)    Generalized anxiety disorder    Depression    Attention deficit hyperactivity disorder, predominantly inattentive type    " Displacement of lumbar intervertebral disc without myelopathy    Anxiety    Suicidal ideation    Bipolar 1 disorder (H)

## 2025-08-07 NOTE — PLAN OF CARE
BEH IP Unit Acuity Rating Score (UARS)  Patient is given one point for every criteria they meet.    CRITERIA SCORING   On a 72 hour hold, court hold, committed, stay of commitment, or revocation. 0    Patient LOS on BEH unit exceeds 20 days. 0  LOS: 9   Patient under guardianship, 55+, otherwise medically complex, or under age 11. 0   Suicide ideation without relief of precipitating factors. 1   Current plan for suicide. 0   Current plan for homicide. 0   Imminent risk or actual attempt to seriously harm another without relief of factors precipitating the attempt. 0   Severe dysfunction in daily living (ex: complete neglect for self care, extreme disruption in vegetative function, extreme deterioration in social interactions). 0   Recent (last 7 days) or current physical aggression in the ED or on unit. 0   Restraints or seclusion episode in past 72 hours. 0   Recent (last 7 days) or current verbal aggression, agitation, yelling, etc., while in the ED or unit. 0   Active psychosis. 0   Need for constant or near constant redirection (from leaving, from others, etc).  0   Intrusive or disruptive behaviors. 0   Patient requires 3 or more hours of individualized nursing care per 8-hour shift (i.e. for ADLs, meds, therapeutic interventions). 0   TOTAL 1

## 2025-08-07 NOTE — PLAN OF CARE
-Attending Provider: JEEVAN Lin CNP  -Voicemail Code: [id number]  -Team Note Due: Tuesday  -Next Steps:    Discharge Friday 8/8 @ 10:30am  7 day supply of medication filled @  pharmacy  21 day supply sent to [facility] will order cab  AVS complete  Send pre admit paperwork to [facility]           Assessment/Intervention/Current Symtoms and Care Coordination:  Chart review and met with team, discussed pt progress, symptomology, and response to treatment.  Discussed the discharge plan and any potential impediments to discharge.    [facility] received email from [facility] confirming they would like 7 day supply of meds sent with Fuentes and the rest (21 day supply) sent to [place] [address].   [facility] attempted to schedule psych appt with [place]. CTC sat on hold for 20+ minutes before needing to end call.   [facility] requested assistance from care coordinators in scheduling psych appt  CTC met with Fuentes to review discharge plans. Fuentes confirmed he was still on board with discharge plans. Fuentes expressed anxiety around his shoes and his phone. MAGGIE offered to wash his shoes and charge his phone before end of shift. Fuentes consented to this. [facility] obtained phone and shoes from locker. CTC put shoes in washer with detergent. CTC put label on phone and charged phone in back area.   CTC received confirmation from care coordinator that psych appt was scheduled and AVS was update.     Discharge Plan or Goal:  Pending further stabilization, continued compliance with medications, continued activities of daily living, and development of a safe discharge plan.     Considerations: set up psychiatry    Barriers to Discharge:  Impact of mental health symptoms on well being   Impact of mental health symptoms on activities of daily living  Need for medication monitoring and medication management     Referral Status:  [facility] faxed 7/31 for 3 locations, confirmed received on 8/1, completed interview on 8/6 and approved for admission  of 8/8  [facility] faxed 7/31 for 1 location     Legal Status:  Voluntary    County:   File Number:   Start and expiration date of commitment:     Contacts:

## 2025-08-07 NOTE — PLAN OF CARE
"  Rehab Group    Start time: 1015  End time: 1115  Patient time total: 55 minutes    attended full group    #5 attended   Group Type: occupational therapy and OT Clinic   Group Topic Covered: balanced lifestyle, coping skills, healthy leisure time, problem solving, relationship skills/support systems, self-esteem, and social skills     Group Session Detail:  OT: Pt actively participated in occupational therapy clinic to facilitate coping skill exploration, creative expression within personally meaningful activities, and clinical observation of social, cognitive, and kinesthetic performance skills.     Patient Response/Contribution:  cooperative with task, organized, supportive of peers, socially appropriate, safe use of materials/supplies, attentive, and actively engaged     Patient Detail:    Pt arrived to group on time. Pt presented as pleasant, and appeared to be calm. Pt decided to continue to engage in a stain glass project started in past sessions. Pt able to finish project in allotted time, pt was critical of his work (which pt has been demonstrating throughout groups in past). Pt does have insight of this, stated that he wonders if \"he will ever be less negative.\"     Pt engaged in multiple social interactions with peers and staff. Pt requested songs to listen to throughout group, and remained appropriate. Pt required prompts at the end of group to clean workspace and materials before exiting.          [id number] OT Group (2 or more in attendance)      Patient Active Problem List   Diagnosis    Other acne    Social phobia    Panic disorder without agoraphobia    Major depressive disorder, single episode, moderate (H)    Generalized anxiety disorder    Depression    Attention deficit hyperactivity disorder, predominantly inattentive type    Displacement of lumbar intervertebral disc without myelopathy    Anxiety    Suicidal ideation    Bipolar 1 disorder (H)         "

## 2025-08-07 NOTE — PLAN OF CARE
Problem: Adult Behavioral Health Plan of Care  Goal: Adheres to Safety Considerations for Self and Others  Outcome: Progressing  Intervention: Develop and Maintain Individualized Safety Plan  Recent Flowsheet Documentation  Taken 8/7/2025 0320 by Karri Pantoja RN  Safety Measures: safety rounds completed     Problem: Suicide Risk  Goal: Absence of Self-Harm  Outcome: Progressing     Problem: Sleep Disturbance  Goal: Adequate Sleep/Rest  Outcome: Progressing   Goal Outcome Evaluation:       Patient was calm and non-aggressive, was observed sleeping during safety checks. Breathing appeared normal and patient did not seem to be in any distress.      Sleep Hours: 7

## 2025-08-07 NOTE — PLAN OF CARE
Care Coordinator Note(s):    Care Request(s):   Psychiatry   Preferences: Time Frame: 2 Weeks, Any Appointment Type (In Person, Virtual, Telephone)  Notes: [facility] Psychiatry        Care Outcome(s):    CC Progress Note(s)/ Documentation:     [facility] Intake - [phone]      Appointment: Psychiatry   Date/time:  Wednesday September 3rd, 2025 @ 8:10 AM In person  Provider:  Kathya Fernandez MD  Address: [place] [id number] [place] [id number]  Phone: [phone]    Note:   A clinic representative will be reaching out to you in the next few days to conduct a short screening and provide you with further new patient information.      -Inés Nicholson  Adult Behavioral Health Care Coordinator

## 2025-08-07 NOTE — PLAN OF CARE
RN: Pt A/O x4, VSS. Pt spent most of the shift in the lounge watching TV, socializing with peers and staffs. Pt presented as full range affect, mood was clam. Pt denied SI/SIB/HI, hallucinations. Pt endorsed mild anxiety and depression earlier during this shift, but declined interventions. Pt is medication compliant, no reported or observed side effects. Pt is eating and drinking adequately, reports no issues with bowel or bladder. Pt denied pain or physical discomfort. Continue with POC.        Goal Outcome Evaluation:

## 2025-08-07 NOTE — PLAN OF CARE
Problem: Adult Behavioral Health Plan of Care  Goal: Optimized Coping Skills in Response to Life Stressors  Outcome: Progressing    Pt presented as alert and oriented to place and self throughout shift.  They were primarily visible in the milieu during the day.  Pt did attend OT groups when available.   They were dressed appropriately and appeared adequately hygenic.  Pt is eating and drinking adequately.  They were compliant with their scheduled medications.  Pt requested PRN nicotine on this shift.  VSS and no issues with bowel or bladder.  Pt endorsed mild anxiety and depression, however denied any interventions.  They did not endorse any symptoms of psychosis.  Pt denied pain or any acute physical health concerns.  No side effects to medications noted this shift.    Goal Outcome Evaluation:    Plan of Care Reviewed With: patient

## 2025-08-07 NOTE — PROGRESS NOTES
"PSYCHIATRY  PROGRESS NOTE     DATE OF SERVICE   8/7/2025       CHIEF COMPLAINT   \" Better.\"       SUBJECTIVE   Per nursing documentation:     Pt A/O x4, VSS. Pt spent most of the shift in the lounge watching TV, socializing with peers and staffs. Pt presented as full range affect, mood was clam. Pt denied SI/SIB/HI, hallucinations. Pt endorsed mild anxiety and depression earlier during this shift, but declined interventions. Pt is medication compliant, no reported or observed side effects. Pt is eating and drinking adequately, reports no issues with bowel or bladder. Pt denied pain or physical discomfort. Continue with POC.     Patient is documented to sleep 7 hours overnight.    Per unit CTC documentation:    Unit CTC to facilitate aftercare plan.  Referrals for IRTS programs have been made and plan will be to set up outpatient psychiatry appointments.         OBJECTIVE   Met with patient in hospital room of unit 10N. Upon patient interview, patient reports their mood as, \" A little up and down.\"  Patient's affect appears blunted however slightly brighter and improving.  Patient reports mood is consistently more depressed in the morning and then improved throughout the day.  Patient reports depression symptoms rated 5/10 (0=none, 10=severe).  Patient reports that depression symptoms have been overall improving with initiation and dose titration of Latuda.  Patient reports experiencing some mild anxiety which is more situational and that he is feeling feeling slightly nervous about starting the IRTS program however also that he is motivated to start the program.  Patient denies any side effects of current medication regimen.  Patient reports ongoing thoughts of passive SI which occur immediately when he wakes up however resolved after an hour.  Reports he has not experienced any passive SI during the day which is an improvement for him; patient also reports that passive SI is baseline for him and has been going on " for most of his life.  Patient denies any active thoughts of SI, SIB, SA, intent or plan. Denies HI. Patient able to contract for safety.  Writer discussed plan to continue Latuda 60 mg p.o. daily with supper mg p.o. daily with supper to target mood disorder symptoms.  Patient in agreement with this plan. Patient denies any AH or VH. Patient denies any paranoid thoughts or delusions.  Patient does not report any issues with sleep overnight.  Plan will be to continue Seroquel 150-200 mg p.o. at bedtime to further target sleep promotion, and patient in agreement with this.  Patient reports appetite is stable however at baseline they experience low appetite. Denies any issue with bowel or bladder. Patient vital signs reviewed and noted to be stable. Patient denies any acute medical concerns today. Patient has no other questions or concerns.  Briefly discuss discharge plan with patient and plan is for patient to discharge to IR program tomorrow 8/8/2025.  Writer discussed the plan will be to discharge patient with a 7-day supply of medications and then send the remainder 21-day supply to pharmacy affiliated with the IRTS program which is a [place] [facility].  Patient in agreement with this plan.     MEDICATIONS   Medications:  Scheduled Meds:  Current Facility-Administered Medications   Medication Dose Route Frequency Provider Last Rate Last Admin    amphetamine-dextroamphetamine (ADDERALL XR) ER cap 20 mg  20 mg Oral Daily Yanet Moreira APRN CNP   20 mg at 08/07/25 0848    amphetamine-dextroamphetamine (ADDERALL) per tablet 5 mg  5 mg Oral BID Yanet Moreira APRN CNP   5 mg at 08/07/25 1239    ascorbic acid (vitamin C) chewable tablet 250 mg  250 mg Oral Daily Rosy Gifford MD   250 mg at 08/07/25 0848    lurasidone (LATUDA) tablet 60 mg  60 mg Oral Daily with supper Yanet Moreira APRN CNP   60 mg at 08/06/25 1757    nicotine (NICODERM CQ) 21 MG/24HR 24 hr patch 1 patch  1 patch Transdermal Daily  Yanet Moreira APRN CNP   1 patch at 08/07/25 0848    nicotine patch REMOVAL   Transdermal At Bedtime Yanet Moreira APRN CNP        QUEtiapine (SEROquel) tablet 150 mg  150 mg Oral At Bedtime Yanet Moreira APRN CNP   150 mg at 08/06/25 2059    Or    QUEtiapine (SEROquel) tablet 200 mg  200 mg Oral At Bedtime Yanet Moreira APRN CNP         Continuous Infusions:  Current Facility-Administered Medications   Medication Dose Route Frequency Provider Last Rate Last Admin     PRN Meds:.  Current Facility-Administered Medications   Medication Dose Route Frequency Provider Last Rate Last Admin    acetaminophen (TYLENOL) tablet 650 mg  650 mg Oral Q4H PRN Washington Villeda MD   650 mg at 08/01/25 1218    alum & mag hydroxide-simethicone (MAALOX) suspension 30 mL  30 mL Oral Q4H PRN Washington Villeda MD        aspirin-acetaminophen-caffeine (EXCEDRIN MIGRAINE) per tablet 1 tablet  1 tablet Oral BID PRN Yanet Moreira APRN CNP   1 tablet at 08/01/25 1429    hydrOXYzine HCl (ATARAX) tablet 25 mg  25 mg Oral Q4H PRN Yanet Moreira APRN CNP   25 mg at 08/01/25 1432    nicotine polacrilex (NICORETTE) gum 4 mg  4 mg Buccal Q1H PRN Yanet Moreira APRN CNP   4 mg at 08/07/25 1303    OLANZapine (zyPREXA) tablet 10 mg  10 mg Oral TID PRN Washington Villeda MD   10 mg at 08/02/25 1831    Or    OLANZapine (zyPREXA) injection 10 mg  10 mg Intramuscular TID PRN Washington Villeda MD        polyethylene glycol (MIRALAX) Packet 17 g  17 g Oral Daily PRN Washington Villeda MD        QUEtiapine (SEROquel) tablet 25 mg  25 mg Oral TID PRN Rosy Gifford MD   25 mg at 08/07/25 1625    Or    QUEtiapine (SEROquel) tablet 50 mg  50 mg Oral TID PRN Rosy Gifford MD   50 mg at 08/02/25 1416    traZODone (DESYREL) tablet 50 mg  50 mg Oral At Bedtime PRN Washington Villeda MD   50 mg at 07/29/25 1058       Medication adherence issues: MS Med  "Adherence Y/N: Yes, Patient previously reports a history of noncompliance with psychiatric medications in the community  Medication side effects: MEDICATION SIDE EFFECTS: Denies  Benefit: Yes / No: Yes       ROS   Pertinent items are noted in HPI.       MENTAL STATUS EXAM   Vitals: /70 (BP Location: Right arm)   Pulse 100   Temp 97.7  F (36.5  C) (Temporal)   Resp 16   Ht 1.829 m (6')   Wt 86.6 kg (191 lb)   SpO2 99%   BMI 25.90 kg/m      Appearance:  Casually groomed  Mood: Reports, \"a little up and down.\"  Patient's mood is observed to be calm.  Affect: blunted, slightly brighter and improving was congruent to speech  Suicidal Ideation: none currently  Homicidal Ideation: none  Thought process: Goal oriented and logical  Thought content: Denies any current SI or HI, able to contract for safety.   Fund of Knowledge: Average  Attention/Concentration: Fair  Language ability:  Intact  Memory: Appears intact, not formally assessed  Insight:  fair.  Judgement: fair  Orientation: Yes, x4  Psychomotor Behavior: normal or unremarkable    Muscle Strength and Tone: MuscleStrength: Normal  Gait and Station: Normal       LABS   personally reviewed.     EKG completed on 7/30/2025 and results: Sinus rhythm, normal EKG, QTc: 431     Writer reviewed admission labs.  Patient noted to have slightly elevated anion gap: 17, lipids elevated including cholesterol: 207 plan will be to continue to monitor lab values and treat as clinically indicated.     Latest Reference Range & Units 07/29/25 14:40 07/30/25 07:31 07/30/25 13:33   Sodium 135 - 145 mmol/L  141    Potassium 3.4 - 5.3 mmol/L  4.3    Chloride 98 - 107 mmol/L  103    Carbon Dioxide (CO2) 22 - 29 mmol/L  21 (L)    Urea Nitrogen 6.0 - 20.0 mg/dL  14.2    Creatinine 0.67 - 1.17 mg/dL  0.86    GFR Estimate >60 mL/min/1.73m2  >90    Calcium 8.8 - 10.4 mg/dL  9.2    Anion Gap 7 - 15 mmol/L  17 (H)    Albumin 3.5 - 5.2 g/dL  4.3    Protein Total 6.4 - 8.3 g/dL  6.8  " "  Alkaline Phosphatase 40 - 150 U/L  62    ALT 0 - 70 U/L  53    AST 0 - 45 U/L  43    Bilirubin Total <=1.2 mg/dL  0.5    Cholesterol <200 mg/dL  207 (H)    Glucose 70 - 99 mg/dL  112 (H)    HDL Cholesterol >=40 mg/dL  49    Estimated Average Glucose <117 mg/dL  108    Hemoglobin A1C <5.7 %  5.4    LDL Cholesterol Calculated <100 mg/dL  134 (H)    Non HDL Cholesterol <130 mg/dL  158 (H)    Triglycerides <150 mg/dL  122    Vitamin B12 232 - 1,245 pg/mL  365    Vitamin D, Total (25-Hydroxy) 20 - 50 ng/mL  22    WBC 4.0 - 11.0 10e3/uL  7.2    Hemoglobin 13.3 - 17.7 g/dL  14.4    Hematocrit 40.0 - 53.0 %  43.3    Platelet Count 150 - 450 10e3/uL  227    RBC Count 4.40 - 5.90 10e6/uL  4.83    MCV 78 - 100 fL  90    MCH 26.5 - 33.0 pg  29.8    MCHC 31.5 - 36.5 g/dL  33.3    RDW 10.0 - 15.0 %  12.9    Amphetamine Qual Urine Screen Negative  Screen Negative     Fentanyl Qual Urine Screen Negative  Screen Negative     Cocaine Urine Screen Negative  Screen Negative     Benzodiazepine Urine Screen Negative  Screen Negative     Opiates Qualitative Urine Screen Negative  Screen Negative     PCP Urine Screen Negative  Screen Negative     Cannabinoids Qual Urine Screen Negative  Screen Negative     Barbiturates Qual Urine Screen Negative  Screen Negative     EKG 12-lead, tracing only    Rpt (P)   Diastolic Blood Pressure mmHg   See Comment   Systolic Blood Pressure mmHg   See Comment   (L): Data is abnormally low  (H): Data is abnormally high  (P): Preliminary  Rpt: View report in Results Review for more information    No results found for: \"PHENYTOIN\", \"PHENOBARB\", \"VALPROATE\", \"CBMZ\"       DIAGNOSIS   Principal Problem:    Bipolar 1 disorder (H), currently depressed  Suicide attempt  Anxiety disorder, unspecified  ADHD, predominantly inattentive type  History of alcohol use disorder  History of MDD  History of opiate use disorder, in remission  Cannabis use    Clinically Significant Risk Factors                                # " "Overweight: Estimated body mass index is 25.9 kg/m  as calculated from the following:    Height as of this encounter: 1.829 m (6').    Weight as of this encounter: 86.6 kg (191 lb).          # Financial/Environmental Concerns: other (see comments) (no longer working, recently lost apartment)                Active Problem List:  Patient Active Problem List   Diagnosis    Other acne    Social phobia    Panic disorder without agoraphobia    Major depressive disorder, single episode, moderate (H)    Generalized anxiety disorder    Depression    Attention deficit hyperactivity disorder, predominantly inattentive type    Displacement of lumbar intervertebral disc without myelopathy    Anxiety    Suicidal ideation    Bipolar 1 disorder (H)          HOSPITAL COURSE AND ASSESSMENT   This is a 40 year old male with history of depression, anxiety Disorder, ADHD, Bipolar Disorder, Substance Use Disorder, YANA, panic, persistent depressive disorder and social phobia who presented to [facility] ED on 7/29/2025 reporting SI.  In the ED, patient reported that he put a bag over his head yesterday as a suicide attempt by suffocation.  Urine drug screening completed in the ED and negative for all substances. Patient was evaluated by provider in the ED and determined to be medically stable.  Patient subsequently admitted voluntarily to inpatient psychiatric unit 10N with attending Dr. Gifford for further psychiatric stabilization.  Upon admission, patient placed on status 15 monitoring.  Patient also placed on precautions: Suicide precautions.     At time of admission, patient reports noncompliance to psychiatric medications for the past 6 weeks.  Patient reports he did not take PTA medications due to, \"they were left in my apartment and now are in storage.\"  Patient reports that he stopped Abilify due to it being ineffective for mood disorder symptoms.  Patient also reports he trialed Prozac however did not find this medication helpful.  " Patient reports that PTA Adderall has been effective for management of ADHD symptoms and that this medication helps significantly with concentration.  Patient reports he was taking Adderall XR 30 mg daily along with Adderall 10 mg instant release 2 times daily however that he has not taken this in approximately 6 weeks.  Subsequently, writer discussed option of decreasing Adderall XR to 20 mg p.o. daily and Adderall IR to 5 mg twice daily to target ADHD symptom management.  Patient in agreement with this.  Patient also agreeable to continue trial of Latuda which was started in the ED.  Patient also reports quetiapine to be helpful for sleep and anxiety symptoms.  Due to this, writer discussed option of initiating quetiapine 25-50 mg p.o. 3 times daily as needed for irritation, anxiety, and sleep which patient is in agreement with.  Patient reports engaging in nicotine use described as vaping all day.  Patient agreeable to utilizing nicotine patch and nicotine gum for nicotine withdrawal symptoms.  Patient also agreeable to an EKG to monitor heart rhythm.  Patient reports notably poor p.o. intake prior to present to the hospital however that he is also concerned about weight gain; due to this writer placed dietitian consult to address poor p.o. intake and options for healthy snacks and writing menu options.       PLAN   1. Ongoing education given regarding diagnostic and treatment options with risks, benefits and alternatives and adequate verbalization of understanding.  2.  Medications:  Continue Adderall XR 20 mg p.o. daily to target ADHD symptoms; dose decrease due to patient reporting noncompliance to higher dosing prior to admission the hospital.  Continue Adderall IR 5 mg p.o. 2 times daily as patient reports noncompliance to medication prior to admitting to the hospital  Continue Latuda 60 mg p.o. daily with supper, must be taken with a minimum of 350 loly.  Indication: Mood disorder symptoms  Continue  quetiapine 150-200 mg p.o. at bedtime, indication: Anxiety and sleep promotion  Continue nicotine 21 mg/24-hour patch, 1 patch transdermal daily for nicotine withdrawal symptoms  PRN nicotine gum 4 mg buccal every 1 hour as needed for nicotine withdrawal symptoms  PRN quetiapine 25-50 mg p.o. 3 times daily, first-line for anxiety symptoms, agitation, and sleep promotion  PRN olanzapine 10 mg p.o. 3 times daily as needed for agitation, order linked with backup olanzapine 10 mg IM injection  PRN trazodone 50 mg p.o. at bedtime as needed for sleep promotion, may repeat after 60 minutes  PRN hydroxyzine 25 mg p.o. over fours as needed for anxiety symptoms    3.  Labs/Imaging:  - None today    4. Consults:  -Hospitalist to be consulted as needed.    5. Precautions:  - Suicide precautions    6. Legal:  - Voluntary    7. Discharge planning:  -Per unit CTC        Risk Assessment: IP [facility] RISK ASSESSMENT: Patient able to contract for safety and Patient on precautions    Coordination of Care:   Treatment Plan reviewed and physician signed, Care discussed with Care/Treatment Team Members, Chart reviewed and Patient seen      Re-Certification I certify that the inpatient psychiatric facility services furnished since the previous certification were, and continue to be, medically necessary for, either, treatment which could reasonably be expected to improve the patient s condition or diagnostic study and that the hospital records indicate that the services furnished were, either, intensive treatment services, admission and related services necessary for diagnostic study, or equivalent services.     I certify that the patient continues to need, on a daily basis, active treatment furnished directly by or requiring the supervision of inpatient psychiatric facility personnel.     I estimate 7-10 days of hospitalization is necessary for proper treatment of the patient. My plans for post-hospital care for this patient are   TBD     JEEVAN Chu CNP    -    8/7/2025     -    12:49 PM    Total time  35 minutes with > 50%spent on coordination of cares and psycho-education.    This note was created with help of Dragon dictation system. Grammatical / typing errors are not intentional.    JEEVAN Chu CNP

## 2025-08-08 VITALS
SYSTOLIC BLOOD PRESSURE: 106 MMHG | WEIGHT: 191 LBS | HEIGHT: 72 IN | BODY MASS INDEX: 25.87 KG/M2 | RESPIRATION RATE: 16 BRPM | TEMPERATURE: 97.3 F | HEART RATE: 85 BPM | OXYGEN SATURATION: 99 % | DIASTOLIC BLOOD PRESSURE: 77 MMHG

## 2025-08-08 PROCEDURE — 250N000013 HC RX MED GY IP 250 OP 250 PS 637

## 2025-08-08 PROCEDURE — 250N000013 HC RX MED GY IP 250 OP 250 PS 637: Performed by: PSYCHIATRY & NEUROLOGY

## 2025-08-08 PROCEDURE — 97150 GROUP THERAPEUTIC PROCEDURES: CPT | Mod: GO

## 2025-08-08 PROCEDURE — 99239 HOSP IP/OBS DSCHRG MGMT >30: CPT

## 2025-08-08 RX ORDER — NICOTINE 21 MG/24HR
1 PATCH, TRANSDERMAL 24 HOURS TRANSDERMAL DAILY
Qty: 23 PATCH | Refills: 1 | Status: SHIPPED | OUTPATIENT
Start: 2025-08-08

## 2025-08-08 RX ORDER — LURASIDONE HYDROCHLORIDE 60 MG/1
60 TABLET, FILM COATED ORAL
Qty: 23 TABLET | Refills: 0 | Status: SHIPPED | OUTPATIENT
Start: 2025-08-08 | End: 2025-08-11

## 2025-08-08 RX ORDER — DEXTROAMPHETAMINE SACCHARATE, AMPHETAMINE ASPARTATE MONOHYDRATE, DEXTROAMPHETAMINE SULFATE AND AMPHETAMINE SULFATE 5; 5; 5; 5 MG/1; MG/1; MG/1; MG/1
20 CAPSULE, EXTENDED RELEASE ORAL DAILY
Qty: 23 CAPSULE | Refills: 0 | Status: SHIPPED | OUTPATIENT
Start: 2025-08-08 | End: 2025-08-11

## 2025-08-08 RX ORDER — HYDROXYZINE HYDROCHLORIDE 25 MG/1
25 TABLET, FILM COATED ORAL 2 TIMES DAILY PRN
Qty: 46 TABLET | Refills: 0 | Status: SHIPPED | OUTPATIENT
Start: 2025-08-08

## 2025-08-08 RX ORDER — QUETIAPINE FUMARATE 50 MG/1
150-200 TABLET, FILM COATED ORAL AT BEDTIME
Qty: 92 TABLET | Refills: 0 | Status: SHIPPED | OUTPATIENT
Start: 2025-08-08 | End: 2025-08-11

## 2025-08-08 RX ORDER — DEXTROAMPHETAMINE SACCHARATE, AMPHETAMINE ASPARTATE, DEXTROAMPHETAMINE SULFATE AND AMPHETAMINE SULFATE 2.5; 2.5; 2.5; 2.5 MG/1; MG/1; MG/1; MG/1
5 TABLET ORAL 2 TIMES DAILY
Qty: 23 TABLET | Refills: 0 | Status: SHIPPED | OUTPATIENT
Start: 2025-08-08 | End: 2025-08-11

## 2025-08-08 RX ORDER — QUETIAPINE FUMARATE 25 MG/1
25 TABLET, FILM COATED ORAL 2 TIMES DAILY PRN
Qty: 46 TABLET | Refills: 0 | Status: SHIPPED | OUTPATIENT
Start: 2025-08-08 | End: 2025-08-11

## 2025-08-08 RX ADMIN — DEXTROAMPHETAMINE SULFATE, DEXTROAMPHETAMINE SACCHARATE, AMPHETAMINE SULFATE AND AMPHETAMINE ASPARTATE 20 MG: 5; 5; 5; 5 CAPSULE, EXTENDED RELEASE ORAL at 07:51

## 2025-08-08 RX ADMIN — Medication 250 MG: at 07:51

## 2025-08-08 RX ADMIN — DEXTROAMPHETAMINE SACCHARATE, AMPHETAMINE ASPARTATE, DEXTROAMPHETAMINE SULFATE AND AMPHETAMINE SULFATE 5 MG: 1.25; 1.25; 1.25; 1.25 TABLET ORAL at 07:51

## 2025-08-08 RX ADMIN — DEXTROAMPHETAMINE SACCHARATE, AMPHETAMINE ASPARTATE, DEXTROAMPHETAMINE SULFATE AND AMPHETAMINE SULFATE 5 MG: 1.25; 1.25; 1.25; 1.25 TABLET ORAL at 12:32

## 2025-08-08 RX ADMIN — NICOTINE 1 PATCH: 21 PATCH, EXTENDED RELEASE TRANSDERMAL at 07:51

## 2025-08-08 ASSESSMENT — ACTIVITIES OF DAILY LIVING (ADL)
ADLS_ACUITY_SCORE: 20

## 2025-08-08 NOTE — PLAN OF CARE
-Attending Provider: JEEVAN Lin CNP  -Voicemail Code: [id number]  -Team Note Due: Tuesday  -Next Steps:    Discharge Friday 8/8 @ 10:30am  7 day supply of medication filled @  pharmacy  21 day supply sent to [facility]  AVS complete          Assessment/Intervention/Current Symtoms and Care Coordination:  Chart review and met with team, discussed pt progress, symptomology, and response to treatment.  Discussed the discharge plan and any potential impediments to discharge.    CTC sent completed pre admit paperwork [facility]   CTC called [facility] at 11am and notified them [facility] will be later than expected due to issues with getting medications filled.     Discharge Plan or Goal:  Pending further stabilization, continued compliance with medications, continued activities of daily living, and development of a safe discharge plan.     Considerations: set up psychiatry    Barriers to Discharge:  Impact of mental health symptoms on well being   Impact of mental health symptoms on activities of daily living  Need for medication monitoring and medication management     Referral Status:  [facility] faxed 7/31 for 3 locations, confirmed received on 8/1, completed interview on 8/6 and approved for admission of 8/8  [facility] faxed 7/31 for 1 location     Legal Status:  Voluntary    County:   File Number:   Start and expiration date of commitment:     Contacts:

## 2025-08-08 NOTE — DISCHARGE SUMMARY
PSYCHIATRY  DISCHARGE SUMMARY     DATE OF DISCHARGE   08/08/2025           DISCHARGE DIAGNOSIS   Bipolar 1 disorder (H), currently depressed  Suicide attempt  Anxiety disorder, unspecified  ADHD, predominantly inattentive type  History of alcohol use disorder  History of MDD  History of opiate use disorder, in remission  Cannabis use      Clinically Significant Risk Factors                                # Overweight: Estimated body mass index is 25.9 kg/m  as calculated from the following:    Height as of this encounter: 1.829 m (6').    Weight as of this encounter: 86.6 kg (191 lb).          # Financial/Environmental Concerns: other (see comments) (no longer working, recently lost apartment)               Patient Active Problem List   Diagnosis    Other acne    Social phobia    Panic disorder without agoraphobia    Major depressive disorder, single episode, moderate (H)    Generalized anxiety disorder    Depression    Attention deficit hyperactivity disorder, predominantly inattentive type    Displacement of lumbar intervertebral disc without myelopathy    Anxiety    Suicidal ideation    Bipolar 1 disorder (H)          REASON FOR ADMISSION   This is a 40 year old male with history of depression, anxiety Disorder, ADHD, Bipolar Disorder, Substance Use Disorder, YANA, panic, persistent depressive disorder and social phobia who presented to [facility] ED on 7/29/2025 reporting SI.  In the ED, patient reported that he put a bag over his head yesterday as a suicide attempt by suffocation.  Urine drug screening completed in the ED and negative for all substances. Patient was evaluated by provider in the ED and determined to be medically stable.  Patient subsequently admitted voluntarily to inpatient psychiatric unit 10N with attending Dr. Gifford for further psychiatric stabilization.  Upon admission, patient placed on status 15 monitoring.  Patient also placed on precautions: Suicide precautions.        HOSPITAL COURSE  "  Admitted due to aforementioned presentation.  Education regarding diagnostic and treatment options with risks, benefits and alternatives and adequate verbalization of understanding.  Discussed reviewed in further detail, stressors and events leading to presentation.    During current hospitalization direct care was provided by JEEVAN Chu CNP.    At time of admission, patient reports noncompliance to psychiatric medications for the past 6 weeks.  Patient reports he did not take PTA medications due to, \"they were left in my apartment and now are in storage.\"  Patient reports that he stopped Abilify due to it being ineffective for mood disorder symptoms.  Patient also reports he trialed Prozac however did not find this medication helpful.  Patient reports that PTA Adderall has been effective for management of ADHD symptoms and that this medication helps significantly with concentration.  Patient reports he was taking Adderall XR 30 mg daily along with Adderall 10 mg instant release 2 times daily however that he has not taken this in approximately 6 weeks.  Subsequently, Adderall dose was decreased to Adderall XR to 20 mg p.o. daily and Adderall IR to 5 mg twice daily to target ADHD symptom management which patient was in agreement with and reported this dose to be effective for ADHD symptom management.  During hospitalization, Latuda was initiated and titrated to therapeutic dose to target mood disorder symptoms.  Patient tolerated this well and mood disorder symptoms stabilized.  During hospitalization patient utilized PRN quetiapine 25-50 mg p.o. 3 times daily as needed for agitation, anxiety, and sleep.  Patient reported PRN quetiapine to be effective.  Patient also utilized to tie being scheduled at bedtime for sleep promotion which patient reported was effective.  Writer discussed with patient that plan for the future could be to taper off quetiapine if Latuda is effective for management of " "psychiatric symptoms however this can be addressed outpatient.  Patient verbalized agreement with this plan.  During hospitalization patient utilized nicotine patch and nicotine gum for nicotine withdrawal symptoms which patient reported effective.  Patient did report he has low appetite at baseline and was provided with education that Adderall could be contributing to this; patient was documented to eat and drink adequately during hospitalization and met with dietitian during hospitalization.      On day of discharge, provider met with patient in hospital room of unit 10N. Upon patient interview, patient reports their mood as, \" better.\"  Patient's affect appears normal range.  Patient reports mild anxiety symptoms triggered by discharge today which she rates severity level of 4/10  (0=none, 10=severe).  Patient reports depression symptoms are severity level 6/10  (0=none, 10=severe).  Patient ports depression symptoms continue to improve and are at a tolerable level.  Patient denies any AH or VH. Patient denies any paranoid thoughts or delusions. Patient endorses sleeping adequately overnight. Patient reports appetite is baseline decreased however stable and they are eating well and as well as drinking fluids adequately.  Denies any issues with bowel or bladder.  Patient vital signs reviewed and noted to be stable. Patient denies any medical concerns today. Patient has no other questions or concerns. Today, plan will be to discharge to IRTS program.    Patient reports passive SI as a baseline for him and has been ongoing for many years.  Patient reports at baseline he experiences SI immediately when he wakes up however this has been improving significantly.  Patient denies any active SI, SIB, SA, plan or intent. Denies HI. Patient is able to contract for safety. Patient does have notable risk factors for self-harm, including single status, history of substance use disorder, and previous suicide attempts. However, " risk is mitigated by ability to volunteer a safety plan and history of seeking help when needed. Therefore, based on all available evidence including the factors cited above, patient does not appear to be at imminent risk for self-harm, does not meet criteria for a 72-hr hold, and therefore remains appropriate for ongoing outpatient level of care. Voluntary referral for IRTS program was offered, they accepted this offer. Provider discussed with patient that if they develop thoughts of SI, SIB, SA, HI, plan or intent, or any other medical emergency, to go to the nearest ED or call 911. Patient verbalizes understanding of this. Patient was also provided with crisis numbers and mental health resources in the community at time of discharge.      Labs:    LABS   personally reviewed.      EKG completed on 7/30/2025 and results: Sinus rhythm, normal EKG, QTc: 431     Writer reviewed admission labs.  Patient noted to have slightly elevated anion gap: 17, lipids elevated including cholesterol: 207 plan will be to continue to monitor lab values and treat as clinically indicated.       Latest Reference Range & Units 07/29/25 14:40 07/30/25 07:31 07/30/25 13:33   Sodium 135 - 145 mmol/L   141     Potassium 3.4 - 5.3 mmol/L   4.3     Chloride 98 - 107 mmol/L   103     Carbon Dioxide (CO2) 22 - 29 mmol/L   21 (L)     Urea Nitrogen 6.0 - 20.0 mg/dL   14.2     Creatinine 0.67 - 1.17 mg/dL   0.86     GFR Estimate >60 mL/min/1.73m2   >90     Calcium 8.8 - 10.4 mg/dL   9.2     Anion Gap 7 - 15 mmol/L   17 (H)     Albumin 3.5 - 5.2 g/dL   4.3     Protein Total 6.4 - 8.3 g/dL   6.8     Alkaline Phosphatase 40 - 150 U/L   62     ALT 0 - 70 U/L   53     AST 0 - 45 U/L   43     Bilirubin Total <=1.2 mg/dL   0.5     Cholesterol <200 mg/dL   207 (H)     Glucose 70 - 99 mg/dL   112 (H)     HDL Cholesterol >=40 mg/dL   49     Estimated Average Glucose <117 mg/dL   108     Hemoglobin A1C <5.7 %   5.4     LDL Cholesterol Calculated <100 mg/dL    "134 (H)     Non HDL Cholesterol <130 mg/dL   158 (H)     Triglycerides <150 mg/dL   122     Vitamin B12 232 - 1,245 pg/mL   365     Vitamin D, Total (25-Hydroxy) 20 - 50 ng/mL   22     WBC 4.0 - 11.0 10e3/uL   7.2     Hemoglobin 13.3 - 17.7 g/dL   14.4     Hematocrit 40.0 - 53.0 %   43.3     Platelet Count 150 - 450 10e3/uL   227     RBC Count 4.40 - 5.90 10e6/uL   4.83     MCV 78 - 100 fL   90     MCH 26.5 - 33.0 pg   29.8     MCHC 31.5 - 36.5 g/dL   33.3     RDW 10.0 - 15.0 %   12.9     Amphetamine Qual Urine Screen Negative  Screen Negative       Fentanyl Qual Urine Screen Negative  Screen Negative       Cocaine Urine Screen Negative  Screen Negative       Benzodiazepine Urine Screen Negative  Screen Negative       Opiates Qualitative Urine Screen Negative  Screen Negative       PCP Urine Screen Negative  Screen Negative       Cannabinoids Qual Urine Screen Negative  Screen Negative       Barbiturates Qual Urine Screen Negative  Screen Negative       EKG 12-lead, tracing only       Rpt (P)   Diastolic Blood Pressure mmHg     See Comment   Systolic Blood Pressure mmHg     See Comment   (L): Data is abnormally low  (H): Data is abnormally high  (P): Preliminary  Rpt: View report in Results Review for more information     No results found for: \"PHENYTOIN\", \"PHENOBARB\", \"VALPROATE\", \"CBMZ\"         Behavioral discharge plan and instructions were reviewed with patient and a copy was provided to them at time of discharge:    Behavioral Discharge Planning and Instructions     Summary: You were admitted to [facility] on 7/29/2025 due to Depression, Anxiety, and Suicidal Ideations. You were treated by JEEVAN Lin CNP and discharged on 08/08/2025 to [facility].     Main Diagnosis:   Bipolar 1 disorder (H), currently depressed  Suicide attempt  Anxiety disorder, unspecified  ADHD, predominantly inattentive type     Health Care Follow-up:      Appointment: Psychiatry   " Date/time:  Wednesday September 3rd, 2025 @ 8:10 AM In person  Provider:  Kathya Fernandez MD  Address: [place] [id number] [place] [address]  Phone: [phone]          Note:   A clinic representative will be reaching out to you in the next few days to conduct a short screening and provide you with further new patient information.     Attend all scheduled appointments with your outpatient providers. Call at least 24 hours in advance if you need to reschedule an appointment to ensure continued access to your outpatient providers.      Major Treatments, Procedures and Findings:  You were provided with: a psychiatric assessment, medication evaluation and/or management, group therapy, individual therapy, and milieu management     Symptoms to Report: Feeling more aggressive, increased confusion, losing more sleep, mood getting worse, or thoughts of suicide.     Early warning signs can include: Increased depression or anxiety sleep disturbances increased thoughts or behaviors of suicide or self-harm  increased unusual thinking, such as paranoia or hearing voices.     Safety and Wellness: Take all medicines as directed. Make no changes unless your doctor suggests them. Follow treatment recommendations. Refrain from alcohol and non-prescribed drugs. Ask your support system to help you reduce your access to items that could harm yourself or others. If there is a concern for safety, call [phone].     [facility] Patient Navigation Hub: [facility] s Navigators work to be your point-of-contact for trustworthy and compassionate care from Inpatient services to [facility] Programmatic Care. We will provide resources and communication to help guide you into programmatic care. Ultimately, our goal is to be the one-stop-shop of communication, coordination, and support for your journey to programmatic care. Phone: [phone]     Community Resources:   General Mental  "Health Resources:   EmPATH - or Emergency Psychiatric Assessment, Treatment, and Healing: new emergency mental health ER that provides a calming environment with expert care so that people in crisis can get help quickly.  [facility] location: [address] [place], [address]  [facility]: provides immediate evaluation, therapy, and access to psychiatry. Initial assessment is required before medication can be prescribed  [address] [place] [id number] (On the 2nd Floor)     Monday-Thursday 8am-5pm and Friday 8am-4pm  Phone: [phone] Fax: [phone]  [facility]: provides immediate evaluation, therapy, and access to psychiatry    [address] [place] [address] [phone]  Daily 9am-9pm   National B[employer] on Mental Illness (STACY) [place]: Connect for help, to navigate the mental health system, and for support and for resources. Call: 3-437-AQKP-Helps / 1-800-539-1232  Crisis Text Line: The 24/7 emergency service is available if you or someone you know is experiencing a psychiatric or mental health crisis. Text: \"[place]\" to 895753  [facility]: Are you an adult needing support? Talk to a specialist who has firsthand experience living with a mental health condition. Call: [phone]  Text: \"Support\" to [address]  mentalhealth[place].org/support/[place]-warmline/  National Suicide Prevention Lifeline: The 24/7 lifeline provides support when in distress, has prevention and crisis resources for you or your loved ones, and resources for professionals. Call 4-502-769-TALK (8067)  Peer Support Connection Warmlines: Svbw-ej-fjpp telephone support that s safe and supportive. Open 5 p.m. to 9 a.m. Call or text: 1-395.610.5578   COVID Cares Stress Phone Support Service. Any [place] experiencing stress can call 671-QDNU6MU (374-170-8598) for free telephone support from 9am to 9pm every day. The " service is a collaboration with volunteers from the [place] Psychiatric Society, the [place] Psychological Association, the [place] [employer], and [facility] [place]. The free service is also accessible at [employer]org where searchers can also find psychiatric and mental health services availability and real-time Substance Use Disorder Treatment program openings.  Adult Rehabilitative Mental Health Services (ARM): https://[place].gov/dhs/partners-and-providers/policies-procedures/adult-mental-health/adult-rehabilitative-mental-health-services/arm-certified-providers/  [facility] Crisis Response [phone]  [facility] Crisis Response [phone]  [facility] Crisis (COPE) Response - Adult [phone]  [facility] Crisis Response - Adult [phone]  [facility] Rapid Response [phone]     Outpatient Psychiatry/Therapy Resources:   [facility] - (phone: [phone]) https://www.[employer]/care/specialty/mental-behavioral-health/  https://www.[employer]/care/find/doctors/psychologists/  [facility] - (phone: 9-953-BOKBNTPQ) https://www.[facility].org/treatments/Counseling-Adult  [facility] - (phone: [phone]) https://[facility].[employer]/  [facility] - (phone: [phone])  https://[facility]-mn.[employer]/  [facility] - (phone: 1-488.160.8291) https://www.WHATT.[employer]/  [facility] - (phone: 484.480.6244) https://www.[facility].com/  [facility] - (phone: 201.421.7943) https://[employer]ok.[employer]/  [facility] - (phone: [phone]) https://walkin.org/     General Medication Instructions:   See your medication sheet(s) for instructions.   Take all medicines as directed.  Make no changes unless your doctor suggests them.   Go to all your doctor visits.  Be sure to have all your  "required lab tests. This way, your medicines can be refilled on time.  Do not use any drugs not prescribed by your doctor.  Avoid alcohol.     Advance Directives:   Scanned document on file with [employer]? No scanned doc  Is document scanned? No. Copy Requested.  Honoring Choices Your Rights Handout: Informed and given  Was more information offered? Pt declined     The Treatment team has appreciated the opportunity to work with you. If you have any questions or concerns about your recent admission, you can contact the unit which can receive your call 24 hours a day, 7 days a week. They will be able to get in touch with a Provider if needed. The unit number is [phone].     MENTAL STATUS EXAM   Vitals: /77 (BP Location: Right arm, Patient Position: Sitting, Cuff Size: Adult Regular)   Pulse 85   Temp 97.3  F (36.3  C) (Temporal)   Resp 16   Ht 1.829 m (6')   Wt 86.6 kg (191 lb)   SpO2 99%   BMI 25.90 kg/m      Mental Status:  Appearance:  Casually groomed  Mood: Reports, \" okay.\"   Affect: Normal range was congruent to speech  Suicidal Ideation: none   Homicidal Ideation: none  Thought process: Goal oriented and logical  Thought content: Denies any current SI or HI, able to contract for safety.   Fund of Knowledge: Average  Attention/Concentration: Fair  Language ability:  Intact  Memory: Appears intact, not formally assessed  Insight:  fair.  Judgement: fair  Orientation: Yes, x4  Psychomotor Behavior: normal or unremarkable    Muscle Strength and Tone: MuscleStrength: Normal  Gait and Station: Normal         DISCHARGE MEDICATIONS   Discharge Medication Options:   Current Discharge Medication List        START taking these medications    Details   ascorbic acid 250 MG CHEW Take 250 mg by mouth daily.  Qty: 7 tablet, Refills: 0    Associated Diagnoses: Bipolar 1 disorder (H)      aspirin-acetaminophen-caffeine (EXCEDRIN MIGRAINE) 250-250-65 MG tablet Take 1 tablet by mouth 2 times daily as needed for " headaches.  Qty: 7 tablet, Refills: 0    Associated Diagnoses: Bipolar 1 disorder (H)      hydrOXYzine HCl (ATARAX) 25 MG tablet Take 1 tablet (25 mg) by mouth 2 times daily as needed for anxiety.  Qty: 14 tablet, Refills: 0    Associated Diagnoses: Generalized anxiety disorder      lurasidone (LATUDA) 60 MG TABS tablet Take 1 tablet (60 mg) by mouth daily (with dinner).  Qty: 7 tablet, Refills: 0    Associated Diagnoses: Bipolar 1 disorder (H)      nicotine (NICODERM CQ) 21 MG/24HR 24 hr patch Place 1 patch over 24 hours onto the skin daily.  Qty: 7 patch, Refills: 0    Associated Diagnoses: Bipolar 1 disorder (H)      nicotine polacrilex (NICORETTE) 4 MG gum Place 1 each (4 mg) inside cheek every hour as needed for nicotine withdrawal symptoms.  Qty: 110 each, Refills: 0    Associated Diagnoses: Bipolar 1 disorder (H)           CONTINUE these medications which have CHANGED    Details   amphetamine-dextroamphetamine (ADDERALL XR) 20 MG 24 hr capsule Take 1 capsule (20 mg) by mouth daily.  Qty: 7 capsule, Refills: 0    Associated Diagnoses: Attention deficit hyperactivity disorder, predominantly inattentive type      amphetamine-dextroamphetamine (ADDERALL) 5 MG tablet Take 1 tablet (5 mg) by mouth 2 times daily.  Qty: 14 tablet, Refills: 0    Associated Diagnoses: Attention deficit hyperactivity disorder, predominantly inattentive type      !! QUEtiapine (SEROQUEL) 25 MG tablet Take 1 tablet (25 mg) by mouth 2 times daily as needed (anxiety, agitation).  Qty: 14 tablet, Refills: 0    Associated Diagnoses: Bipolar 1 disorder (H)      !! QUEtiapine (SEROQUEL) 50 MG tablet Take 3-4 tablets (150-200 mg) by mouth at bedtime.  Qty: 28 tablet, Refills: 0    Associated Diagnoses: Bipolar 1 disorder (H)       !! - Potential duplicate medications found. Please discuss with provider.        STOP taking these medications       amphetamine-dextroamphetamine (ADDERALL XR) 30 MG 24 hr capsule Comments:   Reason for Stopping:          amphetamine-dextroamphetamine (ADDERALL) 20 MG tablet Comments:   Reason for Stopping:               Medication adherence issues: MS Med Adherence Y/N: Yes, patient has previously reported a history of noncompliance with psychiatric medications however was compliant during hospitalization  Medication side effects: MEDICATION SIDE EFFECTS: no side effects reported         DISCHARGE PLAN   1.  Education given regarding diagnostic and treatment options with risks, benefits and alternatives with adequate verbalization of understanding.  2.  Discharge to IRTS program. Upon detailed review of risk factors, patient amenable for release.   3.  Continue aforementioned medications and associated medication changes with follow-up by outpatient mental health provider.  4.  Crisis management planning in place.    5.  Continue efforts for sobriety.  6. Attend follow up appointments: Appointment: Psychiatry   Date/time:  Wednesday September 3rd, 2025 @ 8:10 AM In person  Provider:  Kathya Fernandez MD  Address: [place] [address] [address] [place] [address]  Phone: [phone]          Note:   A clinic representative will be reaching out to you in the next few days to conduct a short screening and provide you with further new patient information..      Nursing and  to review further discharge recommendations.     TOTAL TIME:  Greater than 30 minutes for discharge planning.    This note was created with help of Dragon dictation system. Grammatical / typing errors are not intentional.    JEEVAN Chu CNP

## 2025-08-08 NOTE — PLAN OF CARE
BEH IP Unit Acuity Rating Score (UARS)  Patient is given one point for every criteria they meet.    CRITERIA SCORING   On a 72 hour hold, court hold, committed, stay of commitment, or revocation. 0    Patient LOS on BEH unit exceeds 20 days. 0  LOS: 10   Patient under guardianship, 55+, otherwise medically complex, or under age 11. 0   Suicide ideation without relief of precipitating factors. 1   Current plan for suicide. 0   Current plan for homicide. 0   Imminent risk or actual attempt to seriously harm another without relief of factors precipitating the attempt. 0   Severe dysfunction in daily living (ex: complete neglect for self care, extreme disruption in vegetative function, extreme deterioration in social interactions). 0   Recent (last 7 days) or current physical aggression in the ED or on unit. 0   Restraints or seclusion episode in past 72 hours. 0   Recent (last 7 days) or current verbal aggression, agitation, yelling, etc., while in the ED or unit. 0   Active psychosis. 0   Need for constant or near constant redirection (from leaving, from others, etc).  0   Intrusive or disruptive behaviors. 0   Patient requires 3 or more hours of individualized nursing care per 8-hour shift (i.e. for ADLs, meds, therapeutic interventions). 0   TOTAL 1

## 2025-08-08 NOTE — PLAN OF CARE
Pt appeared asleep during the night with no apparent signs of pain or distress. No evidence of self harm or report of suicidal ideation.   Pt slept well this shift with no c/o insomnia. Slept 6.75 hours.    Problem: Sleep Disturbance  Goal: Adequate Sleep/Rest  Outcome: Progressing     Problem: Suicide Risk  Goal: Absence of Self-Harm  Outcome: Adequate for Care Transition   Goal Outcome Evaluation:

## 2025-08-08 NOTE — PLAN OF CARE
RN: Pt A/O x4, VSS. Pt spent most of the shift in the lounge watching TV, socializing with peers. Pt presented as full range affect, mood was clam. Pt denied SI/SIB/HI, hallucinations or depression. Pt endorsed anxiety at the level of 5/10 earlier during this shift, PRN 25 mg of Seroquel was given with good effect. Pt is medication compliant, no reported or observed side effects. Nicotine patch removed at HS. Pt is eating and drinking adequately, reports no issues with bowel or bladder. Pt is looking forward to discharging to IR tomorrow. Continue with POC.    Goal Outcome Evaluation:    Plan of Care Reviewed With: patient

## 2025-08-08 NOTE — PLAN OF CARE
Nursing discharge note  Pt alert and oriented x 3. Presents polite and cooperative. Able to communicate needs. Speech is clear and coherent. Mood is calm with flat affect. Insight and judgement are fair. Hopeful for future. Visible in milieu and appropriately social with peers and staff. Appetite adequate. Pt is medication compliance. Slept 7.0 hours last night.   Discharged today. All discharge medications and instructions were reviewed with pt. Copy of discharge instruction and unit address/phone number given to pt. Walking, escorted down stairs and transferred to car safely.   At the time of discharge, pt denied any SI, SIB, HI, hallucination, racing thought, suicidal or homicidal ideations. No evidence of psychosis or paranoid/delusional thoughts. Pt denies access to guns. Denies feeling hopeless or helpless. Pt is determined to not be an immediate danger to himself or others.   Discharge medication: [facility]  Discharge place: [facility]  Transportation: Taxied   Outcome: Progressing  Pain: Denies   Valuable: Given to pt from locker

## 2025-08-08 NOTE — PLAN OF CARE
"  Rehab Group    Start time: 1015  End time: 1200  Patient time total: 30 minutes    attended partial group    #4 attended   Group Type: occupational therapy and OT Clinic   Group Topic Covered: balanced lifestyle, coping skills, healthy leisure time, problem solving, relationship skills/support systems, self-esteem, and social skills     Group Session Detail:  OT: Pt actively participated in occupational therapy clinic to facilitate coping skill exploration, creative expression within personally meaningful activities, and clinical observation of social, cognitive, and kinesthetic performance skills.     Patient Response/Contribution:  cooperative with task, organized, supportive of peers, and socially appropriate     Patient Detail:    Pt arrived to group late. Pt was actively preparing for discharge, therefore, pt was in and out of session on this date. When pt was in group, pt did not actively begin a project. Pt engaged in social interactions with peers and staff.     Pt expressed \"feeling ready to try something new\" in regards to his discharge plan. Pt stated he is hopeful \"the next steps will help with his negativity and self-loathing.\" Pt shared that he slept well last night, stated he \"fell asleep at 7:30 PM and did not wake up until 7:00 AM.\" Pt stated that he \"does not typically sleep well due to his dreams.\" Pt requested songs to listen to throughout group, appeared to be restless for discharge. Pt thanked writer, and expressed gratitude for experience before exiting.         [id number] OT Group (2 or more in attendance)      Patient Active Problem List   Diagnosis    Other acne    Social phobia    Panic disorder without agoraphobia    Major depressive disorder, single episode, moderate (H)    Generalized anxiety disorder    Depression    Attention deficit hyperactivity disorder, predominantly inattentive type    Displacement of lumbar intervertebral disc without myelopathy    Anxiety    Suicidal ideation    " Bipolar 1 disorder (H)

## 2025-08-11 ENCOUNTER — MYC REFILL (OUTPATIENT)
Dept: FAMILY MEDICINE | Facility: CLINIC | Age: 40
End: 2025-08-11
Payer: COMMERCIAL

## 2025-08-11 DIAGNOSIS — F90.0 ATTENTION DEFICIT HYPERACTIVITY DISORDER, PREDOMINANTLY INATTENTIVE TYPE: ICD-10-CM

## 2025-08-11 DIAGNOSIS — F31.9 BIPOLAR 1 DISORDER (H): ICD-10-CM

## 2025-08-11 RX ORDER — LURASIDONE HYDROCHLORIDE 60 MG/1
60 TABLET, FILM COATED ORAL
Qty: 30 TABLET | Refills: 0 | Status: SHIPPED | OUTPATIENT
Start: 2025-08-11

## 2025-08-11 RX ORDER — QUETIAPINE FUMARATE 50 MG/1
150-200 TABLET, FILM COATED ORAL AT BEDTIME
Qty: 120 TABLET | Refills: 0 | Status: SHIPPED | OUTPATIENT
Start: 2025-08-11

## 2025-08-11 RX ORDER — DEXTROAMPHETAMINE SACCHARATE, AMPHETAMINE ASPARTATE, DEXTROAMPHETAMINE SULFATE AND AMPHETAMINE SULFATE 2.5; 2.5; 2.5; 2.5 MG/1; MG/1; MG/1; MG/1
5 TABLET ORAL 2 TIMES DAILY
Qty: 30 TABLET | Refills: 0 | Status: SHIPPED | OUTPATIENT
Start: 2025-08-11

## 2025-08-11 RX ORDER — QUETIAPINE FUMARATE 25 MG/1
25 TABLET, FILM COATED ORAL 2 TIMES DAILY PRN
Qty: 60 TABLET | Refills: 0 | Status: SHIPPED | OUTPATIENT
Start: 2025-08-11

## 2025-08-11 RX ORDER — QUETIAPINE FUMARATE 50 MG/1
150-200 TABLET, FILM COATED ORAL AT BEDTIME
Qty: 92 TABLET | Refills: 0 | Status: CANCELLED | OUTPATIENT
Start: 2025-08-11

## 2025-08-11 RX ORDER — DEXTROAMPHETAMINE SACCHARATE, AMPHETAMINE ASPARTATE MONOHYDRATE, DEXTROAMPHETAMINE SULFATE AND AMPHETAMINE SULFATE 5; 5; 5; 5 MG/1; MG/1; MG/1; MG/1
20 CAPSULE, EXTENDED RELEASE ORAL DAILY
Qty: 30 CAPSULE | Refills: 0 | Status: SHIPPED | OUTPATIENT
Start: 2025-08-11

## 2025-08-11 RX ORDER — DEXTROAMPHETAMINE SACCHARATE, AMPHETAMINE ASPARTATE, DEXTROAMPHETAMINE SULFATE AND AMPHETAMINE SULFATE 2.5; 2.5; 2.5; 2.5 MG/1; MG/1; MG/1; MG/1
5 TABLET ORAL 2 TIMES DAILY
Qty: 23 TABLET | Refills: 0 | Status: CANCELLED | OUTPATIENT
Start: 2025-08-11

## 2025-08-11 RX ORDER — DEXTROAMPHETAMINE SACCHARATE, AMPHETAMINE ASPARTATE MONOHYDRATE, DEXTROAMPHETAMINE SULFATE AND AMPHETAMINE SULFATE 5; 5; 5; 5 MG/1; MG/1; MG/1; MG/1
20 CAPSULE, EXTENDED RELEASE ORAL DAILY
Qty: 23 CAPSULE | Refills: 0 | Status: CANCELLED | OUTPATIENT
Start: 2025-08-11

## 2025-08-11 RX ORDER — QUETIAPINE FUMARATE 25 MG/1
25 TABLET, FILM COATED ORAL 2 TIMES DAILY PRN
Qty: 46 TABLET | Refills: 0 | Status: CANCELLED | OUTPATIENT
Start: 2025-08-11

## 2025-08-11 RX ORDER — LURASIDONE HYDROCHLORIDE 60 MG/1
60 TABLET, FILM COATED ORAL
Qty: 23 TABLET | Refills: 0 | Status: CANCELLED | OUTPATIENT
Start: 2025-08-11

## 2025-08-27 ENCOUNTER — VIRTUAL VISIT (OUTPATIENT)
Dept: BEHAVIORAL HEALTH | Facility: CLINIC | Age: 40
End: 2025-08-27
Payer: COMMERCIAL

## 2025-08-27 DIAGNOSIS — F31.9 BIPOLAR 1 DISORDER (H): Primary | ICD-10-CM

## 2025-08-27 DIAGNOSIS — F90.0 ATTENTION DEFICIT HYPERACTIVITY DISORDER, PREDOMINANTLY INATTENTIVE TYPE: ICD-10-CM

## 2025-08-27 DIAGNOSIS — F41.9 ANXIETY: ICD-10-CM

## 2025-08-27 PROCEDURE — 90832 PSYTX W PT 30 MINUTES: CPT | Mod: 95 | Performed by: COUNSELOR

## 2025-08-27 ASSESSMENT — PATIENT HEALTH QUESTIONNAIRE - PHQ9
SUM OF ALL RESPONSES TO PHQ QUESTIONS 1-9: 2
10. IF YOU CHECKED OFF ANY PROBLEMS, HOW DIFFICULT HAVE THESE PROBLEMS MADE IT FOR YOU TO DO YOUR WORK, TAKE CARE OF THINGS AT HOME, OR GET ALONG WITH OTHER PEOPLE: SOMEWHAT DIFFICULT
SUM OF ALL RESPONSES TO PHQ QUESTIONS 1-9: 2

## 2025-08-27 ASSESSMENT — COLUMBIA-SUICIDE SEVERITY RATING SCALE - C-SSRS
TOTAL  NUMBER OF ABORTED OR SELF INTERRUPTED ATTEMPTS SINCE LAST CONTACT: NO
1. SINCE LAST CONTACT, HAVE YOU WISHED YOU WERE DEAD OR WISHED YOU COULD GO TO SLEEP AND NOT WAKE UP?: NO
SUICIDE, SINCE LAST CONTACT: NO
ATTEMPT SINCE LAST CONTACT: NO
6. HAVE YOU EVER DONE ANYTHING, STARTED TO DO ANYTHING, OR PREPARED TO DO ANYTHING TO END YOUR LIFE?: NO
2. HAVE YOU ACTUALLY HAD ANY THOUGHTS OF KILLING YOURSELF?: NO
TOTAL  NUMBER OF INTERRUPTED ATTEMPTS SINCE LAST CONTACT: NO

## 2025-08-28 ENCOUNTER — PATIENT OUTREACH (OUTPATIENT)
Dept: CARE COORDINATION | Facility: CLINIC | Age: 40
End: 2025-08-28
Payer: COMMERCIAL

## 2025-09-01 ENCOUNTER — PATIENT OUTREACH (OUTPATIENT)
Dept: CARE COORDINATION | Facility: CLINIC | Age: 40
End: 2025-09-01
Payer: COMMERCIAL

## 2025-09-03 ENCOUNTER — OFFICE VISIT (OUTPATIENT)
Dept: PSYCHIATRY | Facility: CLINIC | Age: 40
End: 2025-09-03
Attending: STUDENT IN AN ORGANIZED HEALTH CARE EDUCATION/TRAINING PROGRAM
Payer: COMMERCIAL

## 2025-09-03 VITALS
DIASTOLIC BLOOD PRESSURE: 83 MMHG | SYSTOLIC BLOOD PRESSURE: 138 MMHG | HEART RATE: 96 BPM | BODY MASS INDEX: 26.77 KG/M2 | TEMPERATURE: 98.5 F | WEIGHT: 197.4 LBS

## 2025-09-03 DIAGNOSIS — F39 MOOD DISORDER: Primary | ICD-10-CM

## 2025-09-03 DIAGNOSIS — Z72.0 NICOTINE USE: ICD-10-CM

## 2025-09-03 DIAGNOSIS — F90.0 ATTENTION DEFICIT HYPERACTIVITY DISORDER, PREDOMINANTLY INATTENTIVE TYPE: ICD-10-CM

## 2025-09-03 DIAGNOSIS — E55.9 VITAMIN D DEFICIENCY: ICD-10-CM

## 2025-09-03 DIAGNOSIS — F41.1 GENERALIZED ANXIETY DISORDER: ICD-10-CM

## 2025-09-03 RX ORDER — FAMOTIDINE 20 MG
25 TABLET ORAL
Qty: 30 CAPSULE | Refills: 1 | Status: SHIPPED | OUTPATIENT
Start: 2025-09-03

## 2025-09-03 RX ORDER — DEXTROAMPHETAMINE SACCHARATE, AMPHETAMINE ASPARTATE MONOHYDRATE, DEXTROAMPHETAMINE SULFATE AND AMPHETAMINE SULFATE 5; 5; 5; 5 MG/1; MG/1; MG/1; MG/1
20 CAPSULE, EXTENDED RELEASE ORAL DAILY
Qty: 30 CAPSULE | Refills: 0 | Status: SHIPPED | OUTPATIENT
Start: 2025-09-17

## 2025-09-03 RX ORDER — HYDROXYZINE HYDROCHLORIDE 25 MG/1
50 TABLET, FILM COATED ORAL 2 TIMES DAILY PRN
Qty: 46 TABLET | Refills: 1 | Status: SHIPPED | OUTPATIENT
Start: 2025-09-03

## 2025-09-03 RX ORDER — LURASIDONE HYDROCHLORIDE 60 MG/1
60 TABLET, FILM COATED ORAL
Qty: 30 TABLET | Refills: 1 | Status: SHIPPED | OUTPATIENT
Start: 2025-09-03

## 2025-09-03 RX ORDER — QUETIAPINE FUMARATE 50 MG/1
150 TABLET, FILM COATED ORAL AT BEDTIME
Qty: 90 TABLET | Refills: 1 | Status: SHIPPED | OUTPATIENT
Start: 2025-09-03

## 2025-09-03 RX ORDER — DEXTROAMPHETAMINE SACCHARATE, AMPHETAMINE ASPARTATE, DEXTROAMPHETAMINE SULFATE AND AMPHETAMINE SULFATE 2.5; 2.5; 2.5; 2.5 MG/1; MG/1; MG/1; MG/1
5 TABLET ORAL 2 TIMES DAILY
Qty: 30 TABLET | Refills: 0 | Status: SHIPPED | OUTPATIENT
Start: 2025-09-17

## 2025-09-03 RX ORDER — NICOTINE 21 MG/24HR
1 PATCH, TRANSDERMAL 24 HOURS TRANSDERMAL DAILY
Qty: 23 PATCH | Refills: 1 | Status: SHIPPED | OUTPATIENT
Start: 2025-09-03

## 2025-09-03 ASSESSMENT — PATIENT HEALTH QUESTIONNAIRE - PHQ9
SUM OF ALL RESPONSES TO PHQ QUESTIONS 1-9: 3
10. IF YOU CHECKED OFF ANY PROBLEMS, HOW DIFFICULT HAVE THESE PROBLEMS MADE IT FOR YOU TO DO YOUR WORK, TAKE CARE OF THINGS AT HOME, OR GET ALONG WITH OTHER PEOPLE: SOMEWHAT DIFFICULT
SUM OF ALL RESPONSES TO PHQ QUESTIONS 1-9: 3

## 2025-09-03 ASSESSMENT — PAIN SCALES - GENERAL: PAINLEVEL_OUTOF10: NO PAIN (0)

## 2025-09-04 ENCOUNTER — PATIENT OUTREACH (OUTPATIENT)
Dept: CARE COORDINATION | Facility: CLINIC | Age: 40
End: 2025-09-04
Payer: COMMERCIAL